# Patient Record
Sex: FEMALE | Race: WHITE | Employment: OTHER | URBAN - METROPOLITAN AREA
[De-identification: names, ages, dates, MRNs, and addresses within clinical notes are randomized per-mention and may not be internally consistent; named-entity substitution may affect disease eponyms.]

---

## 2019-05-15 ENCOUNTER — HOSPITAL ENCOUNTER (INPATIENT)
Age: 49
LOS: 6 days | Discharge: NURSING, REHAB OR LONG-TERM CARE | DRG: 571 | End: 2019-05-21
Attending: INTERNAL MEDICINE | Admitting: INTERNAL MEDICINE
Payer: MEDICARE

## 2019-05-15 ENCOUNTER — APPOINTMENT (OUTPATIENT)
Dept: GENERAL RADIOLOGY | Age: 49
DRG: 571 | End: 2019-05-15
Payer: MEDICARE

## 2019-05-15 DIAGNOSIS — L03.116 CELLULITIS OF LEFT FOOT: Primary | ICD-10-CM

## 2019-05-15 DIAGNOSIS — L02.619 CELLULITIS AND ABSCESS OF FOOT: ICD-10-CM

## 2019-05-15 DIAGNOSIS — L02.619 ABSCESS OF FOOT: ICD-10-CM

## 2019-05-15 DIAGNOSIS — L03.119 CELLULITIS AND ABSCESS OF FOOT: ICD-10-CM

## 2019-05-15 PROBLEM — Z91.81 RISK FOR FALLS: Status: ACTIVE | Noted: 2019-05-15

## 2019-05-15 PROBLEM — D72.829 LEUKOCYTOSIS: Status: ACTIVE | Noted: 2019-05-15

## 2019-05-15 PROBLEM — M79.672 PAIN IN LEFT FOOT: Status: ACTIVE | Noted: 2019-05-15

## 2019-05-15 PROBLEM — L02.612 ABSCESS OF LEFT FOOT: Status: ACTIVE | Noted: 2019-05-15

## 2019-05-15 LAB
ANION GAP SERPL CALCULATED.3IONS-SCNC: 12 MMOL/L (ref 7–16)
BASOPHILS ABSOLUTE: 0.06 E9/L (ref 0–0.2)
BASOPHILS RELATIVE PERCENT: 0.4 % (ref 0–2)
BUN BLDV-MCNC: 11 MG/DL (ref 6–20)
C-REACTIVE PROTEIN: 9.1 MG/DL (ref 0–0.4)
CALCIUM SERPL-MCNC: 8.9 MG/DL (ref 8.6–10.2)
CHLORIDE BLD-SCNC: 100 MMOL/L (ref 98–107)
CO2: 23 MMOL/L (ref 22–29)
CREAT SERPL-MCNC: 0.5 MG/DL (ref 0.5–1)
EOSINOPHILS ABSOLUTE: 0.2 E9/L (ref 0.05–0.5)
EOSINOPHILS RELATIVE PERCENT: 1.5 % (ref 0–6)
GFR AFRICAN AMERICAN: >60
GFR NON-AFRICAN AMERICAN: >60 ML/MIN/1.73
GLUCOSE BLD-MCNC: 136 MG/DL (ref 74–99)
HCT VFR BLD CALC: 36.5 % (ref 34–48)
HEMOGLOBIN: 12.3 G/DL (ref 11.5–15.5)
IMMATURE GRANULOCYTES #: 0.09 E9/L
IMMATURE GRANULOCYTES %: 0.7 % (ref 0–5)
LACTIC ACID: 1.2 MMOL/L (ref 0.5–2.2)
LYMPHOCYTES ABSOLUTE: 1.18 E9/L (ref 1.5–4)
LYMPHOCYTES RELATIVE PERCENT: 8.7 % (ref 20–42)
MCH RBC QN AUTO: 31.1 PG (ref 26–35)
MCHC RBC AUTO-ENTMCNC: 33.7 % (ref 32–34.5)
MCV RBC AUTO: 92.4 FL (ref 80–99.9)
MONOCYTES ABSOLUTE: 1.13 E9/L (ref 0.1–0.95)
MONOCYTES RELATIVE PERCENT: 8.4 % (ref 2–12)
NEUTROPHILS ABSOLUTE: 10.87 E9/L (ref 1.8–7.3)
NEUTROPHILS RELATIVE PERCENT: 80.3 % (ref 43–80)
PDW BLD-RTO: 12.6 FL (ref 11.5–15)
PLATELET # BLD: 277 E9/L (ref 130–450)
PMV BLD AUTO: 10 FL (ref 7–12)
POTASSIUM SERPL-SCNC: 4 MMOL/L (ref 3.5–5)
RBC # BLD: 3.95 E12/L (ref 3.5–5.5)
SEDIMENTATION RATE, ERYTHROCYTE: 63 MM/HR (ref 0–20)
SODIUM BLD-SCNC: 135 MMOL/L (ref 132–146)
WBC # BLD: 13.5 E9/L (ref 4.5–11.5)

## 2019-05-15 PROCEDURE — 99220 PR INITIAL OBSERVATION CARE/DAY 70 MINUTES: CPT | Performed by: NURSE PRACTITIONER

## 2019-05-15 PROCEDURE — 86140 C-REACTIVE PROTEIN: CPT

## 2019-05-15 PROCEDURE — 2580000003 HC RX 258: Performed by: PHYSICIAN ASSISTANT

## 2019-05-15 PROCEDURE — 87070 CULTURE OTHR SPECIMN AEROBIC: CPT

## 2019-05-15 PROCEDURE — 6360000002 HC RX W HCPCS: Performed by: PHYSICIAN ASSISTANT

## 2019-05-15 PROCEDURE — 87040 BLOOD CULTURE FOR BACTERIA: CPT

## 2019-05-15 PROCEDURE — 73590 X-RAY EXAM OF LOWER LEG: CPT

## 2019-05-15 PROCEDURE — 96375 TX/PRO/DX INJ NEW DRUG ADDON: CPT

## 2019-05-15 PROCEDURE — 1200000000 HC SEMI PRIVATE

## 2019-05-15 PROCEDURE — 36415 COLL VENOUS BLD VENIPUNCTURE: CPT

## 2019-05-15 PROCEDURE — 6370000000 HC RX 637 (ALT 250 FOR IP): Performed by: NURSE PRACTITIONER

## 2019-05-15 PROCEDURE — 73630 X-RAY EXAM OF FOOT: CPT

## 2019-05-15 PROCEDURE — 87077 CULTURE AEROBIC IDENTIFY: CPT

## 2019-05-15 PROCEDURE — 85651 RBC SED RATE NONAUTOMATED: CPT

## 2019-05-15 PROCEDURE — 83605 ASSAY OF LACTIC ACID: CPT

## 2019-05-15 PROCEDURE — 96365 THER/PROPH/DIAG IV INF INIT: CPT

## 2019-05-15 PROCEDURE — 99285 EMERGENCY DEPT VISIT HI MDM: CPT

## 2019-05-15 PROCEDURE — 2580000003 HC RX 258: Performed by: NURSE PRACTITIONER

## 2019-05-15 PROCEDURE — 85025 COMPLETE CBC W/AUTO DIFF WBC: CPT

## 2019-05-15 PROCEDURE — 80048 BASIC METABOLIC PNL TOTAL CA: CPT

## 2019-05-15 PROCEDURE — 87186 SC STD MICRODIL/AGAR DIL: CPT

## 2019-05-15 RX ORDER — CEPHALEXIN 500 MG/1
500 CAPSULE ORAL 2 TIMES DAILY
Status: ON HOLD | COMMUNITY
End: 2019-05-16 | Stop reason: HOSPADM

## 2019-05-15 RX ORDER — SODIUM CHLORIDE 0.9 % (FLUSH) 0.9 %
10 SYRINGE (ML) INJECTION PRN
Status: DISCONTINUED | OUTPATIENT
Start: 2019-05-15 | End: 2019-05-17 | Stop reason: SDUPTHER

## 2019-05-15 RX ORDER — ACETAMINOPHEN 325 MG/1
650 TABLET ORAL EVERY 4 HOURS PRN
Status: DISCONTINUED | OUTPATIENT
Start: 2019-05-15 | End: 2019-05-21 | Stop reason: HOSPADM

## 2019-05-15 RX ORDER — BETAMETHASONE DIPROPIONATE 0.5 MG/G
1 CREAM TOPICAL 2 TIMES DAILY
Status: ON HOLD | COMMUNITY
End: 2019-05-21 | Stop reason: HOSPADM

## 2019-05-15 RX ORDER — SODIUM CHLORIDE 9 MG/ML
INJECTION, SOLUTION INTRAVENOUS CONTINUOUS
Status: DISCONTINUED | OUTPATIENT
Start: 2019-05-15 | End: 2019-05-19

## 2019-05-15 RX ORDER — 0.9 % SODIUM CHLORIDE 0.9 %
1000 INTRAVENOUS SOLUTION INTRAVENOUS ONCE
Status: COMPLETED | OUTPATIENT
Start: 2019-05-15 | End: 2019-05-15

## 2019-05-15 RX ORDER — FOLIC ACID 1 MG/1
1 TABLET ORAL DAILY
COMMUNITY
End: 2021-11-24 | Stop reason: ALTCHOICE

## 2019-05-15 RX ORDER — FOLIC ACID 1 MG/1
1 TABLET ORAL DAILY
Status: DISCONTINUED | OUTPATIENT
Start: 2019-05-16 | End: 2019-05-21 | Stop reason: HOSPADM

## 2019-05-15 RX ORDER — ONDANSETRON 2 MG/ML
4 INJECTION INTRAMUSCULAR; INTRAVENOUS EVERY 6 HOURS PRN
Status: DISCONTINUED | OUTPATIENT
Start: 2019-05-15 | End: 2019-05-21 | Stop reason: HOSPADM

## 2019-05-15 RX ORDER — THIAMINE MONONITRATE (VIT B1) 100 MG
100 TABLET ORAL DAILY
COMMUNITY

## 2019-05-15 RX ORDER — GABAPENTIN 300 MG/1
300 CAPSULE ORAL 3 TIMES DAILY
COMMUNITY

## 2019-05-15 RX ORDER — THIAMINE MONONITRATE (VIT B1) 100 MG
100 TABLET ORAL DAILY
Status: DISCONTINUED | OUTPATIENT
Start: 2019-05-16 | End: 2019-05-21 | Stop reason: HOSPADM

## 2019-05-15 RX ORDER — VITAMIN B COMPLEX
1 CAPSULE ORAL DAILY
COMMUNITY
End: 2019-05-15 | Stop reason: DRUGHIGH

## 2019-05-15 RX ORDER — GABAPENTIN 600 MG/1
600 TABLET ORAL 3 TIMES DAILY
COMMUNITY
End: 2019-05-15 | Stop reason: DRUGHIGH

## 2019-05-15 RX ORDER — SULFAMETHOXAZOLE AND TRIMETHOPRIM 800; 160 MG/1; MG/1
1 TABLET ORAL 2 TIMES DAILY
Status: ON HOLD | COMMUNITY
End: 2019-05-16 | Stop reason: HOSPADM

## 2019-05-15 RX ORDER — GABAPENTIN 300 MG/1
300 CAPSULE ORAL 3 TIMES DAILY
Status: DISCONTINUED | OUTPATIENT
Start: 2019-05-15 | End: 2019-05-21 | Stop reason: HOSPADM

## 2019-05-15 RX ORDER — SODIUM CHLORIDE 0.9 % (FLUSH) 0.9 %
10 SYRINGE (ML) INJECTION EVERY 12 HOURS SCHEDULED
Status: DISCONTINUED | OUTPATIENT
Start: 2019-05-15 | End: 2019-05-17 | Stop reason: SDUPTHER

## 2019-05-15 RX ADMIN — Medication 10 ML: at 21:13

## 2019-05-15 RX ADMIN — VANCOMYCIN HYDROCHLORIDE 1500 MG: 10 INJECTION, POWDER, LYOPHILIZED, FOR SOLUTION INTRAVENOUS at 18:42

## 2019-05-15 RX ADMIN — SERTRALINE HYDROCHLORIDE 50 MG: 50 TABLET ORAL at 21:12

## 2019-05-15 RX ADMIN — CEFAZOLIN 2 G: 1 INJECTION, POWDER, FOR SOLUTION INTRAMUSCULAR; INTRAVENOUS at 18:28

## 2019-05-15 RX ADMIN — SODIUM CHLORIDE 1000 ML: 9 INJECTION, SOLUTION INTRAVENOUS at 18:31

## 2019-05-15 RX ADMIN — SODIUM CHLORIDE: 9 INJECTION, SOLUTION INTRAVENOUS at 21:12

## 2019-05-15 RX ADMIN — GABAPENTIN 300 MG: 300 CAPSULE ORAL at 21:12

## 2019-05-15 ASSESSMENT — PAIN DESCRIPTION - ORIENTATION: ORIENTATION: LEFT

## 2019-05-15 ASSESSMENT — PAIN SCALES - GENERAL: PAINLEVEL_OUTOF10: 5

## 2019-05-15 ASSESSMENT — PAIN DESCRIPTION - ONSET: ONSET: ON-GOING

## 2019-05-15 ASSESSMENT — PAIN - FUNCTIONAL ASSESSMENT: PAIN_FUNCTIONAL_ASSESSMENT: PREVENTS OR INTERFERES SOME ACTIVE ACTIVITIES AND ADLS

## 2019-05-15 ASSESSMENT — PAIN DESCRIPTION - LOCATION: LOCATION: FOOT

## 2019-05-15 ASSESSMENT — PAIN DESCRIPTION - PAIN TYPE: TYPE: ACUTE PAIN

## 2019-05-15 ASSESSMENT — PAIN DESCRIPTION - PROGRESSION: CLINICAL_PROGRESSION: NOT CHANGED

## 2019-05-15 ASSESSMENT — PAIN DESCRIPTION - FREQUENCY: FREQUENCY: CONTINUOUS

## 2019-05-15 ASSESSMENT — PAIN DESCRIPTION - DESCRIPTORS: DESCRIPTORS: ACHING;DISCOMFORT;SORE

## 2019-05-15 NOTE — ED PROVIDER NOTES
ED Attending  CC: No  HPI:  5/15/19, Time: 5:21 PM         Hemal Goldsmith is a 51 y.o. female presenting to the ED for  Left foot infection beginning 2 days  ago. The complaint has been persistent, moderate in severity, and worsened by nothing. patient comes in with complaint of infection of her left foot. She states she injured the foot previously when she was stompping her foot on a lawn edger. She did attempt to soak the foot in a bleach water burn to the plantar aspect of the foot. She states she is not diabetic,she  was seen 2 days ago started on antibiotics by Dr. Chelita Tsai . Today was sent in by Dr. Chelita Tsai for admission for foot infection. She denies any fever chills. Review of Systems:   Pertinent positives and negatives are stated within HPI, all other systems reviewed and are negative.          --------------------------------------------- PAST HISTORY ---------------------------------------------  Past Medical History:  has a past medical history of Anxiety and Depression. Past Surgical History:  has a past surgical history that includes Tubal ligation and cyst removal.    Social History:  reports that she has been smoking cigarettes. She has been smoking about 0.50 packs per day. She has never used smokeless tobacco. She reports that she drinks alcohol. She reports that she does not use drugs. Family History: family history is not on file. The patients home medications have been reviewed. Allergies: Patient has no known allergies.     -------------------------------------------------- RESULTS -------------------------------------------------  All laboratory and radiology results have been personally reviewed by myself   LABS:  Results for orders placed or performed during the hospital encounter of 05/15/19   CBC Auto Differential   Result Value Ref Range    WBC 13.5 (H) 4.5 - 11.5 E9/L    RBC 3.95 3.50 - 5.50 E12/L    Hemoglobin 12.3 11.5 - 15.5 g/dL    Hematocrit 36.5 34.0 - 48.0 % MCV 92.4 80.0 - 99.9 fL    MCH 31.1 26.0 - 35.0 pg    MCHC 33.7 32.0 - 34.5 %    RDW 12.6 11.5 - 15.0 fL    Platelets 426 244 - 523 E9/L    MPV 10.0 7.0 - 12.0 fL    Neutrophils % 80.3 (H) 43.0 - 80.0 %    Immature Granulocytes % 0.7 0.0 - 5.0 %    Lymphocytes % 8.7 (L) 20.0 - 42.0 %    Monocytes % 8.4 2.0 - 12.0 %    Eosinophils % 1.5 0.0 - 6.0 %    Basophils % 0.4 0.0 - 2.0 %    Neutrophils # 10.87 (H) 1.80 - 7.30 E9/L    Immature Granulocytes # 0.09 E9/L    Lymphocytes # 1.18 (L) 1.50 - 4.00 E9/L    Monocytes # 1.13 (H) 0.10 - 0.95 E9/L    Eosinophils # 0.20 0.05 - 0.50 E9/L    Basophils # 0.06 0.00 - 0.20 Z0/C   Basic Metabolic Panel   Result Value Ref Range    Sodium 135 132 - 146 mmol/L    Potassium 4.0 3.5 - 5.0 mmol/L    Chloride 100 98 - 107 mmol/L    CO2 23 22 - 29 mmol/L    Anion Gap 12 7 - 16 mmol/L    Glucose 136 (H) 74 - 99 mg/dL    BUN 11 6 - 20 mg/dL    CREATININE 0.5 0.5 - 1.0 mg/dL    GFR Non-African American >60 >=60 mL/min/1.73    GFR African American >60     Calcium 8.9 8.6 - 10.2 mg/dL   Lactic Acid, Plasma   Result Value Ref Range    Lactic Acid 1.2 0.5 - 2.2 mmol/L   Sedimentation Rate   Result Value Ref Range    Sed Rate 63 (H) 0 - 20 mm/Hr       RADIOLOGY:  Interpreted by Radiologist.  XR FOOT LEFT (MIN 3 VIEWS)   Final Result   1. There is no appreciable soft tissue foreign body and there are no   plain film findings of osteomyelitis. 2. Minimal soft tissue emphysema is seen along the lateral cortex of   the first metatarsal. The findings are suggestive of a soft tissue   abscess. XR TIBIA FIBULA LEFT (2 VIEWS)   Final Result   1. Unremarkable left tibia and fibula.          ------------------------- NURSING NOTES AND VITALS REVIEWED ---------------------------   The nursing notes within the ED encounter and vital signs as below have been reviewed.    BP (!) 180/70   Pulse 97   Temp 98 °F (36.7 °C) (Oral)   Resp 18   Ht 6' (1.829 m)   Wt 169 lb (76.7 kg)   SpO2 98%   BMI 22.92 kg/m²   Oxygen Saturation Interpretation: Normal      ---------------------------------------------------PHYSICAL EXAM--------------------------------------      Constitutional/General: Alert and oriented x3, well appearing, non toxic in NAD  Head: Normocephalic and atraumatic  Eyes: PERRL, EOMI  Mouth: Oropharynx clear, handling secretions, no trismus  Neck: Supple, full ROM,   Pulmonary: Lungs clear to auscultation bilaterally, no wheezes, rales, or rhonchi. Not in respiratory distress  Cardiovascular:  Regular rate and rhythm, no murmurs, gallops, or rubs. 2+ distal pulses  Abdomen: Soft, non tender, non distended,   Extremities: Moves all extremities x 4.  Warm and well perfused  Skin: warm and dry without rash  Neurologic: GCS 15,  Psych: Normal Affect      ------------------------------ ED COURSE/MEDICAL DECISION MAKING----------------------  Medications   folic acid (FOLVITE) tablet 1 mg (has no administration in time range)   gabapentin (NEURONTIN) capsule 300 mg (300 mg Oral Given 5/15/19 2112)   sertraline (ZOLOFT) tablet 50 mg (50 mg Oral Given 5/15/19 2112)   vitamin B-1 (THIAMINE) tablet 100 mg (has no administration in time range)   sodium chloride flush 0.9 % injection 10 mL (10 mLs Intravenous Given 5/15/19 2113)   sodium chloride flush 0.9 % injection 10 mL (has no administration in time range)   magnesium hydroxide (MILK OF MAGNESIA) 400 MG/5ML suspension 30 mL (has no administration in time range)   ondansetron (ZOFRAN) injection 4 mg (has no administration in time range)   enoxaparin (LOVENOX) injection 40 mg (has no administration in time range)   0.9 % sodium chloride infusion ( Intravenous New Bag 5/15/19 6071)   acetaminophen (TYLENOL) tablet 650 mg (has no administration in time range)   ceFAZolin (ANCEF) 1 g in sterile water 10 mL IV syringe (has no administration in time range)   vancomycin (VANCOCIN) 1,500 mg in dextrose 5 % 300 mL IVPB (has no administration in time range) ceFAZolin (ANCEF) 2 g in sterile water 20 mL IV syringe (2 g Intravenous Given 5/15/19 1828)   vancomycin (VANCOCIN) 1,500 mg in dextrose 5 % 300 mL IVPB (1,500 mg Intravenous New Bag 5/15/19 1842)   0.9 % sodium chloride bolus (0 mLs Intravenous Stopped 5/15/19 2006)         ED COURSE:     Yumiko Discussed with Dr. Evelyn Girard , he is agreeable to admission. He would like patient's vitals reassessed after IV fluid if heart rate less than 100 she can go to [facility] greater than 100 telemetry floor. 1850 updated patient and family on possible abscess finding plan for admission. 1900 discussed with Dr. Charmayne Sines finding of on subcutaneous air or possible abscess , he will have resident see and evaluate patient and order Mri of the foot       Medical Decision Making:    Patient coming in with complaint of left foot infection, sent in by podiatry for admission. She was given a dose of Ancef and vancomycin. X-ray showed possibility of abscess of the foot. Discussed this with Dr. Asa Mireles he will have the resident's see patient and will obtain an MRI to further assess. Patient is agreeable to admission she is admitted under primary care service         Counseling: The emergency provider has spoken with the patient and discussed todays results, in addition to providing specific details for the plan of care and counseling regarding the diagnosis and prognosis. Questions are answered at this time and they are agreeable with the plan.      --------------------------------- IMPRESSION AND DISPOSITION ---------------------------------    IMPRESSION  1. Cellulitis of left foot    2. Abscess of foot        DISPOSITION  Disposition admit to general medical floor   Patient condition is fair      NOTE: This report was transcribed using voice recognition software.  Every effort was made to ensure accuracy; however, inadvertent computerized transcription errors may be present     Salina Thomsonma  05/15/19 8824

## 2019-05-16 ENCOUNTER — APPOINTMENT (OUTPATIENT)
Dept: MRI IMAGING | Age: 49
DRG: 571 | End: 2019-05-16
Payer: MEDICARE

## 2019-05-16 ENCOUNTER — ANESTHESIA EVENT (OUTPATIENT)
Dept: OPERATING ROOM | Age: 49
DRG: 571 | End: 2019-05-16
Payer: MEDICARE

## 2019-05-16 LAB
ANION GAP SERPL CALCULATED.3IONS-SCNC: 12 MMOL/L (ref 7–16)
BASOPHILS ABSOLUTE: 0.06 E9/L (ref 0–0.2)
BASOPHILS RELATIVE PERCENT: 0.7 % (ref 0–2)
BUN BLDV-MCNC: 11 MG/DL (ref 6–20)
CALCIUM SERPL-MCNC: 8.4 MG/DL (ref 8.6–10.2)
CHLORIDE BLD-SCNC: 106 MMOL/L (ref 98–107)
CO2: 21 MMOL/L (ref 22–29)
CREAT SERPL-MCNC: 0.5 MG/DL (ref 0.5–1)
EOSINOPHILS ABSOLUTE: 0.25 E9/L (ref 0.05–0.5)
EOSINOPHILS RELATIVE PERCENT: 2.9 % (ref 0–6)
GFR AFRICAN AMERICAN: >60
GFR NON-AFRICAN AMERICAN: >60 ML/MIN/1.73
GLUCOSE BLD-MCNC: 104 MG/DL (ref 74–99)
HCT VFR BLD CALC: 33.7 % (ref 34–48)
HEMOGLOBIN: 11 G/DL (ref 11.5–15.5)
IMMATURE GRANULOCYTES #: 0.08 E9/L
IMMATURE GRANULOCYTES %: 0.9 % (ref 0–5)
LACTIC ACID: 1.2 MMOL/L (ref 0.5–2.2)
LYMPHOCYTES ABSOLUTE: 1.82 E9/L (ref 1.5–4)
LYMPHOCYTES RELATIVE PERCENT: 20.8 % (ref 20–42)
MCH RBC QN AUTO: 30.6 PG (ref 26–35)
MCHC RBC AUTO-ENTMCNC: 32.6 % (ref 32–34.5)
MCV RBC AUTO: 93.6 FL (ref 80–99.9)
MONOCYTES ABSOLUTE: 1.04 E9/L (ref 0.1–0.95)
MONOCYTES RELATIVE PERCENT: 11.9 % (ref 2–12)
NEUTROPHILS ABSOLUTE: 5.49 E9/L (ref 1.8–7.3)
NEUTROPHILS RELATIVE PERCENT: 62.8 % (ref 43–80)
PDW BLD-RTO: 12.6 FL (ref 11.5–15)
PLATELET # BLD: 282 E9/L (ref 130–450)
PMV BLD AUTO: 9.7 FL (ref 7–12)
POTASSIUM REFLEX MAGNESIUM: 4 MMOL/L (ref 3.5–5)
RBC # BLD: 3.6 E12/L (ref 3.5–5.5)
SODIUM BLD-SCNC: 139 MMOL/L (ref 132–146)
VANCOMYCIN TROUGH: 5.8 MCG/ML (ref 5–16)
WBC # BLD: 8.7 E9/L (ref 4.5–11.5)

## 2019-05-16 PROCEDURE — 36415 COLL VENOUS BLD VENIPUNCTURE: CPT

## 2019-05-16 PROCEDURE — 85025 COMPLETE CBC W/AUTO DIFF WBC: CPT

## 2019-05-16 PROCEDURE — APPSS30 APP SPLIT SHARED TIME 16-30 MINUTES: Performed by: NURSE PRACTITIONER

## 2019-05-16 PROCEDURE — 80202 ASSAY OF VANCOMYCIN: CPT

## 2019-05-16 PROCEDURE — 1200000000 HC SEMI PRIVATE

## 2019-05-16 PROCEDURE — 83605 ASSAY OF LACTIC ACID: CPT

## 2019-05-16 PROCEDURE — 99233 SBSQ HOSP IP/OBS HIGH 50: CPT | Performed by: INTERNAL MEDICINE

## 2019-05-16 PROCEDURE — 6360000002 HC RX W HCPCS: Performed by: NURSE PRACTITIONER

## 2019-05-16 PROCEDURE — 6370000000 HC RX 637 (ALT 250 FOR IP): Performed by: NURSE PRACTITIONER

## 2019-05-16 PROCEDURE — 73718 MRI LOWER EXTREMITY W/O DYE: CPT

## 2019-05-16 PROCEDURE — 2580000003 HC RX 258: Performed by: NURSE PRACTITIONER

## 2019-05-16 PROCEDURE — 80048 BASIC METABOLIC PNL TOTAL CA: CPT

## 2019-05-16 RX ORDER — KETOROLAC TROMETHAMINE 15 MG/ML
15 INJECTION, SOLUTION INTRAMUSCULAR; INTRAVENOUS EVERY 6 HOURS PRN
Status: DISPENSED | OUTPATIENT
Start: 2019-05-16 | End: 2019-05-17

## 2019-05-16 RX ORDER — LANOLIN ALCOHOL/MO/W.PET/CERES
3 CREAM (GRAM) TOPICAL NIGHTLY PRN
Status: DISCONTINUED | OUTPATIENT
Start: 2019-05-16 | End: 2019-05-21 | Stop reason: HOSPADM

## 2019-05-16 RX ADMIN — KETOROLAC TROMETHAMINE 15 MG: 15 INJECTION, SOLUTION INTRAMUSCULAR; INTRAVENOUS at 21:11

## 2019-05-16 RX ADMIN — VANCOMYCIN HYDROCHLORIDE 1500 MG: 10 INJECTION, POWDER, LYOPHILIZED, FOR SOLUTION INTRAVENOUS at 06:33

## 2019-05-16 RX ADMIN — WATER 1 G: 1 INJECTION INTRAMUSCULAR; INTRAVENOUS; SUBCUTANEOUS at 09:31

## 2019-05-16 RX ADMIN — ACETAMINOPHEN 650 MG: 325 TABLET, FILM COATED ORAL at 11:12

## 2019-05-16 RX ADMIN — ENOXAPARIN SODIUM 40 MG: 40 INJECTION SUBCUTANEOUS at 09:31

## 2019-05-16 RX ADMIN — WATER 1 G: 1 INJECTION INTRAMUSCULAR; INTRAVENOUS; SUBCUTANEOUS at 03:22

## 2019-05-16 RX ADMIN — SERTRALINE HYDROCHLORIDE 50 MG: 50 TABLET ORAL at 21:11

## 2019-05-16 RX ADMIN — GABAPENTIN 300 MG: 300 CAPSULE ORAL at 21:11

## 2019-05-16 RX ADMIN — WATER 1 G: 1 INJECTION INTRAMUSCULAR; INTRAVENOUS; SUBCUTANEOUS at 17:19

## 2019-05-16 RX ADMIN — FOLIC ACID 1 MG: 1 TABLET ORAL at 09:31

## 2019-05-16 RX ADMIN — VANCOMYCIN HYDROCHLORIDE 1250 MG: 10 INJECTION, POWDER, LYOPHILIZED, FOR SOLUTION INTRAVENOUS at 21:11

## 2019-05-16 RX ADMIN — GABAPENTIN 300 MG: 300 CAPSULE ORAL at 14:15

## 2019-05-16 RX ADMIN — THIAMINE HCL TAB 100 MG 100 MG: 100 TAB at 09:31

## 2019-05-16 RX ADMIN — SODIUM CHLORIDE: 9 INJECTION, SOLUTION INTRAVENOUS at 03:30

## 2019-05-16 RX ADMIN — GABAPENTIN 300 MG: 300 CAPSULE ORAL at 09:31

## 2019-05-16 RX ADMIN — ACETAMINOPHEN 650 MG: 325 TABLET, FILM COATED ORAL at 04:50

## 2019-05-16 RX ADMIN — MELATONIN TAB 3 MG 3 MG: 3 TAB at 21:11

## 2019-05-16 ASSESSMENT — PAIN SCALES - GENERAL
PAINLEVEL_OUTOF10: 5
PAINLEVEL_OUTOF10: 0
PAINLEVEL_OUTOF10: 4
PAINLEVEL_OUTOF10: 2
PAINLEVEL_OUTOF10: 0
PAINLEVEL_OUTOF10: 5
PAINLEVEL_OUTOF10: 6

## 2019-05-16 ASSESSMENT — PAIN DESCRIPTION - PROGRESSION
CLINICAL_PROGRESSION: NOT CHANGED
CLINICAL_PROGRESSION: GRADUALLY WORSENING

## 2019-05-16 ASSESSMENT — PAIN DESCRIPTION - FREQUENCY
FREQUENCY: CONTINUOUS
FREQUENCY: INTERMITTENT

## 2019-05-16 ASSESSMENT — PAIN DESCRIPTION - PAIN TYPE
TYPE: ACUTE PAIN
TYPE: ACUTE PAIN

## 2019-05-16 ASSESSMENT — PAIN DESCRIPTION - ORIENTATION
ORIENTATION: LEFT
ORIENTATION: LEFT

## 2019-05-16 ASSESSMENT — PAIN - FUNCTIONAL ASSESSMENT
PAIN_FUNCTIONAL_ASSESSMENT: PREVENTS OR INTERFERES SOME ACTIVE ACTIVITIES AND ADLS
PAIN_FUNCTIONAL_ASSESSMENT: PREVENTS OR INTERFERES SOME ACTIVE ACTIVITIES AND ADLS

## 2019-05-16 ASSESSMENT — PAIN DESCRIPTION - ONSET
ONSET: GRADUAL
ONSET: GRADUAL

## 2019-05-16 ASSESSMENT — PAIN DESCRIPTION - DESCRIPTORS
DESCRIPTORS: ACHING;DISCOMFORT
DESCRIPTORS: ACHING;DISCOMFORT;TENDER

## 2019-05-16 ASSESSMENT — PAIN DESCRIPTION - LOCATION
LOCATION: FOOT
LOCATION: FOOT

## 2019-05-16 ASSESSMENT — LIFESTYLE VARIABLES: SMOKING_STATUS: 1

## 2019-05-16 NOTE — H&P
negative. PMH:  Past Medical History:   Diagnosis Date    Anxiety     Depression        Surgical History:  Past Surgical History:   Procedure Laterality Date    CYST REMOVAL      TUBAL LIGATION         Medications Prior to Admission:    Prior to Admission medications    Medication Sig Start Date End Date Taking? Authorizing Provider   sertraline (ZOLOFT) 50 MG tablet Take 50 mg by mouth nightly   Yes Historical Provider, MD   gabapentin (NEURONTIN) 300 MG capsule Take 300 mg by mouth 3 times daily. Yes Historical Provider, MD   vitamin B-1 (THIAMINE) 100 MG tablet Take 100 mg by mouth daily   Yes Historical Provider, MD   sulfamethoxazole-trimethoprim (BACTRIM DS;SEPTRA DS) 800-160 MG per tablet Take 1 tablet by mouth 2 times daily   Yes Historical Provider, MD   cephALEXin (KEFLEX) 500 MG capsule Take 500 mg by mouth 2 times daily   Yes Historical Provider, MD   folic acid (FOLVITE) 1 MG tablet Take 1 mg by mouth daily   Yes Historical Provider, MD   betamethasone dipropionate (DIPROLENE) 0.05 % cream Apply 1 g topically 2 times daily Apply topically 2 times daily. Yes Historical Provider, MD       Allergies:    Patient has no known allergies. Social History:    reports that she has been smoking cigarettes. She has been smoking about 0.50 packs per day. She has never used smokeless tobacco. She reports that she drinks alcohol. She reports that she does not use drugs. Family History:   family history is not on file. PHYSICAL EXAM:  Vitals:  BP (!) 180/70   Pulse 97   Temp 98 °F (36.7 °C) (Oral)   Resp 18   Ht 6' (1.829 m)   Wt 169 lb (76.7 kg)   SpO2 98%   BMI 22.92 kg/m²     General Appearance: alert and oriented to person, place and time and in no acute distress  Skin: warm and dry. Left pedal edema, erythema, sloughing, and drainage.    Head: normocephalic and atraumatic  Eyes: pupils equal, round, and reactive to light, extraocular eye movements intact, conjunctivae normal  Neck: neck supple and non tender without mass   Pulmonary/Chest: clear to auscultation bilaterally- no wheezes, rales or rhonchi, normal air movement, no respiratory distress  Cardiovascular: normal rate, normal S1 and S2. No gallops, rubs, or murmur noted. Distal pulse 2+  Abdomen: soft, non-tender, non-distended, normal bowel sounds, no masses or organomegaly  Extremities: no cyanosis, no clubbing. Left foot and lower extremity edema. Neurologic: no cranial nerve deficit and speech normal    LABS:  Recent Labs     05/15/19  1753      K 4.0      CO2 23   BUN 11   CREATININE 0.5   GLUCOSE 136*   CALCIUM 8.9       Recent Labs     05/15/19  1753   WBC 13.5*   RBC 3.95   HGB 12.3   HCT 36.5   MCV 92.4   MCH 31.1   MCHC 33.7   RDW 12.6      MPV 10.0       No results for input(s): POCGLU in the last 72 hours. Radiology: Xr Tibia Fibula Left (2 Views)    Result Date: 5/15/2019  Reading location:  200 HISTORY: Wound on the left foot. TECHNIQUE: AP and lateral views of the left tibia and fibula were obtained. FINDINGS: There is no fracture of the tibia or fibula. No soft tissue foreign body is noted. 1. Unremarkable left tibia and fibula. Xr Foot Left (min 3 Views)    Result Date: 5/15/2019  Reading location:  200 HISTORY: Wound along the plantar aspect of the left foot. TECHNIQUE: 3 views of the left foot were obtained. FINDINGS: There is no fracture or dislocation of the left foot. Prominent soft tissue is seen along the plantar aspect of the midfoot. On the frontal view of the foot, there is subcutaneous emphysema seen along the lateral cortex of the first metatarsal. There is no periosteal reaction to suggest osteomyelitis. 1. There is no appreciable soft tissue foreign body and there are no plain film findings of osteomyelitis. 2. Minimal soft tissue emphysema is seen along the lateral cortex of the first metatarsal. The findings are suggestive of a soft tissue abscess.       EKG: None    ASSESSMENT:      Principal Problem:    Cellulitis and abscess of foot  Active Problems:    Leukocytosis    Risk for falls    Pain in left foot  Resolved Problems:    * No resolved hospital problems. *      PLAN:    1. Cellulitis and abscess of left foot- CT with minimal soft tissue emphysema seen along the lateral cortex of the first metatarsal. Suggestive of a soft tissue abscess. WBC 13.5. Sed rate 63. Lactic acid 1.2. Will repeat lactic acid in am. Podiatry consult. Vancomycin 1.5g IV and Cefazolin 2g IV initiated in ED. Vancomycin 1.5g BID and Cefazolin 1g IV Q8. Pharmacy to dose Vacomycin. Vancomycin trough prior to third dose. Wound culture. Wound care to evaluate. CBC, BMP in am.    2. Leukocytosis-WBC 13.5. IVF hydration. Antibiotic therapy as above. Consider Infectious disease consult. Follow blood and urine cultures. Lactic acid and CBC in am.   3. Risk for falls- Assistance with ambulation. Call light within reach. Hourly rounding. 4. Pain in left foot- Analgesia. Monitor pain as 5th VS.Assistance with mobility and repositioning. Code Status: Full  DVT prophylaxis: Lovenox    NOTE: This report was transcribed using voice recognition software. Every effort was made to ensure accuracy; however, inadvertent computerized transcription errors may be present.     Electronically signed by Liz Mccarthy.  Vivianne Duverney - CNP on 5/15/2019 at 10:52 PM

## 2019-05-16 NOTE — PROGRESS NOTES
[place] [address] [facility]   Progress Note    Admitting Date and Time: 5/15/2019  5:24 PM  Admit Dx: Abscess of left foot [L02.612]    Subjective:    Seen and examined  Does report pain to left foot  Very anxious to have a minimal hospital stay as possible      ROS: denies fever, chills, cp, sob, n/v, HA unless stated above.  folic acid  1 mg Oral Daily    gabapentin  300 mg Oral TID    sertraline  50 mg Oral Nightly    vitamin B-1  100 mg Oral Daily    sodium chloride flush  10 mL Intravenous 2 times per day    enoxaparin  40 mg Subcutaneous Daily    ceFAZolin  1 g Intravenous Q8H    vancomycin  1,500 mg Intravenous Q12H       sodium chloride flush 10 mL PRN   magnesium hydroxide 30 mL Daily PRN   ondansetron 4 mg Q6H PRN   acetaminophen 650 mg Q4H PRN        Objective:    BP (!) 154/82   Pulse 96   Temp 98 °F (36.7 °C)   Resp 18   Ht 6' (1.829 m)   Wt 169 lb (76.7 kg)   SpO2 98%   BMI 22.92 kg/m²   General Appearance: alert and oriented to person, place and time and in no acute distress  Skin: warm and dry  Head: normocephalic and atraumatic  Eyes: pupils equal, round, and reactive to light, extraocular eye movements intact, conjunctivae normal  Neck: neck supple and non tender without mass   Pulmonary/Chest: clear to auscultation bilaterally- no wheezes, rales or rhonchi, normal air movement, no respiratory distress  Cardiovascular: normal rate, normal S1 and S2 and no carotid bruits  Abdomen: soft, non-tender, non-distended, normal bowel sounds, no masses or organomegaly  Extremities: no cyanosis, no clubbing and no edema.  Large abscess area to left plantar aspect foot, purulent drainage  Neurologic: no cranial nerve deficit and speech normal      Recent Labs     05/15/19  1753 05/16/19  0501    139   K 4.0 4.0    106   CO2 23 21*   BUN 11 11   CREATININE 0.5 0.5   GLUCOSE 136* 104*   CALCIUM 8.9 8.4*       Recent Labs     05/15/19  1753 05/16/19  0501   WBC 13.5* 8.7 RBC 3.95 3.60   HGB 12.3 11.0*   HCT 36.5 33.7*   MCV 92.4 93.6   MCH 31.1 30.6   MCHC 33.7 32.6   RDW 12.6 12.6    282   MPV 10.0 9.7           Radiology:   MRI FOOT LEFT WO CONTRAST   Final Result   1. Suggestion of soft tissue irregularity within the medial and   plantar soft tissues are overlying the great toe and 1st metatarsal.   No discrete collection is identified to suggest an abscess. 2. No confluent hypointense T1 marrow signal within the great toe or   1st metatarsal to suggest acute osteomyelitis. 3. Small 2nd MTP joint effusion with patchy edema within the 2nd   metatarsal head, possibly reactive, although early changes of   osteomyelitis may have a similar appearance. 4. Mild arthrosis at the 1st TMT joint. XR FOOT LEFT (MIN 3 VIEWS)   Final Result   1. There is no appreciable soft tissue foreign body and there are no   plain film findings of osteomyelitis. 2. Minimal soft tissue emphysema is seen along the lateral cortex of   the first metatarsal. The findings are suggestive of a soft tissue   abscess. XR TIBIA FIBULA LEFT (2 VIEWS)   Final Result   1. Unremarkable left tibia and fibula. Assessment:  Principal Problem:    Cellulitis and abscess of foot  Active Problems:    Leukocytosis    Risk for falls    Pain in left foot  Resolved Problems:    * No resolved hospital problems. *      Plan:    1. Cellulitis and abscess of left foot-  Failed outpatient treatment with Keflex/BactrimXray left foot with minimal soft tissue emphysema  along the lateral cortex of the first metatarsal.  Suggestive of a soft tissue abscess. Sed rate 63. Lactic acid 1.2. Vancomycin 1.5g IV and Cefazolin 2g IV initiated in ED. Will continue for now. Pharmacy to dose Vacomycin. Vancomycin. Wound culture. Wound care to evaluate. CBC, BMP in am.  Has been seen by podiatry, plans for I&D tomorrow with possible wound vac placement. MRI negative for OM.  Consult ID per podiatry request,

## 2019-05-16 NOTE — CONSULTS
Department of Podiatry  Resident Consult Note    Subjective  Podiatry was consulted for a 51year old female who presents with a left foot infection. Pt states she had this wound on the bottom of her foot for approximately 2 weeks. Pt states she was weed wacking when she caught her foot. Pt states after she cut her foot she was soaking her foot and developed an infection. Today, she went to Dr. Natali Garcia podiatry office and after examination, she was told to go to the ED. Pt admits having red/clear drainage, increase redness, increase swelling, and foul odor. Pt admits she has drop foot and cannot feel in her feet. Pt states she also has calluses on her inside of her big toe and under her big toe on the right foot. No acute events overnight. Patient denies any N/V/D/F/C/SOB/CP and has no other complaints at this time. Objective    Past Medical History:   Diagnosis Date    Anxiety     Depression        Past Surgical History:   Procedure Laterality Date    CYST REMOVAL      TUBAL LIGATION         History reviewed. No pertinent family history. No Known Allergies    Vascular: DP and PT pulses 2/4 b/l. CFT <5 secs b/l. Skin temperature warm to cool from proximal to distal.   Neuro: Protective Sensation diminished b/l. Derm: Skin appears supple. Hyperkeratotic tissue noted to the plantar aspect of the 1st MPJ and medial aspect of the hallux of the right foot. Toenails 1-5 b/l are dystrophic. Webspaces 1-4 b/l are C/D/I. Musculoskeletal: Muscle Strength 5/5 for all groups b/l.    Wound Care:   Wound #1: left plantar medial midfoot   Size - approximately 5.0cm x 5.0cm   Appearance -mixed granular/fibrotic with macerated edges that probes to bone   Drainage -serosanguineous   Odor -yes     Wound #2: left plantar 1st MPJ   Size -approximately 1.0cm x 1.0cm   Appearance -fibrotic with tunneling extending laterally   Drainage - serosanguineous   Odor -yes                           Wound#3- left plantar aspect of the hallux               Size- approximately 1.0cm x 1.0cm               Appearance- Fibrotic no granulation with macerated edges               Drainage- serosangineous               Odor-yes     WBC:    Lab Results   Component Value Date    WBC 13.5 05/15/2019     BMP:    Lab Results   Component Value Date     05/15/2019    K 4.0 05/15/2019     05/15/2019    CO2 23 05/15/2019    BUN 11 05/15/2019    LABALBU 3.0 05/10/2015    CREATININE 0.5 05/15/2019    CALCIUM 8.9 05/15/2019    GFRAA >60 05/15/2019    LABGLOM >60 05/15/2019    GLUCOSE 136 05/15/2019       Scheduled Meds:   [START ON 0/88/7756] folic acid  1 mg Oral Daily    gabapentin  300 mg Oral TID    sertraline  50 mg Oral Nightly    [START ON 5/16/2019] vitamin B-1  100 mg Oral Daily    sodium chloride flush  10 mL Intravenous 2 times per day    [START ON 5/16/2019] enoxaparin  40 mg Subcutaneous Daily    [START ON 5/16/2019] ceFAZolin  1 g Intravenous Q8H    [START ON 5/16/2019] vancomycin  1,500 mg Intravenous Q12H     Continuous Infusions:   sodium chloride 125 mL/hr at 05/15/19 2112     PRN Meds:.sodium chloride flush, magnesium hydroxide, ondansetron, acetaminophen    Diagnostics      Assessment  1. Left foot wound  2. Cellulitis and abscess of foot  3. Leukocytosis    Plan  - Patient was examined and evaluated. - Reviewed patient's recent lab results and pertinent diagnostic imaging.  - Reviewed ancillary service notes. - MRI L foot WO contrast pending  - Daily dressings for alginate Ag+, 4x4, ABD and kerlix  -Current antibiotics-Vanco  - After MRI results, patient will be taken to surgery for possible to debridement   - Discussed patient with Dr. Adore Connolly, MARVIN.  - Will continue to follow patient while they are in-house.

## 2019-05-16 NOTE — PROGRESS NOTES
Pharmacy Consultation Note  (Antibiotic Dosing and Monitoring)    Initial consult date: 5/15/19  Consulting physician: Debbie Rogers CNP  Drug(s): Vancomycin  Indication: Cellulitis, abscess    Ht Readings from Last 1 Encounters:   19 6' (1.829 m)     Wt Readings from Last 1 Encounters:   05/15/19 169 lb (76.7 kg)         Age/  Gender IBW DW  Allergy Information   51 y.o.     female  76.7 kg  Patient has no known allergies. Date  WBC BUN/CR UOP Drug/Dose Time   Given Level(s)   (Time) Comments   5/15  (#1) 13.5 11/0.5 -- Vancomycin 1,500 mg IV x 1 184  (#2) 8.7 11/0.5 0.6 mL/kg/hr Vancomycin 1,500 mg IV Q12H 0633 5.8 mcg/mL @ 1841      (#3)            (#4)            (#5)            (#6)            (#7)            Estimated Creatinine Clearance: 157 mL/min (based on SCr of 0.5 mg/dL). UOP over the past 24 hours:       Intake/Output Summary (Last 24 hours) at 2019  Last data filed at 2019 1739  Gross per 24 hour   Intake 2610 ml   Output 1100 ml   Net 1510 ml       Temp max: Temp (24hrs), Av.1 °F (36.7 °C), Min:98 °F (36.7 °C), Max:98.3 °F (36.8 °C)      Antibiotic Regimen:  Antibiotic Dose Date Initiated   Cefazolin 1 g Q8H 5/15     Cultures:  available culture and sensitivity results were reviewed in EPIC  Cultures sent and are pending. Culture Date Result             Assessment:  · Consulted by Debbie Rogers CNP to dose/monitor vancomycin  · Goal trough level:  15-20 mcg/mL  · Pt is a 51 yof admitted with cellulitis and abscess of the foot, no history of diabetes.   · Serum creatinine today: 0.5; CrCl >100 mL/min; baseline Scr ~ 0.4 (), no recent labs  · : s/p I&D today with wound vac placement, level was collected today @ 1841 prior to the third dose = 5.8 mcg/mL (not ordered by pharmacy)    Plan:  · Adjust dose to Vancomycin 1,250 mg IV Q8H  · Check trough at steady state  · Follow renal function  · Pharmacist will follow and monitor/adjust dosing

## 2019-05-16 NOTE — ANESTHESIA PRE PROCEDURE
Department of Anesthesiology  Preprocedure Note       Name:  Deepak Buckley   Age:  51 y.o.  :  1970                                          MRN:  [id number]         Date:  2019      Surgeon: Silvio Ellis):  Lizzy Dick DPM    Procedure: LEFT PLANTAR FOOT INCISION AND DRAINAGE OF ABSCESS WITH POSS APPLICATION OF WOUND VAC AND OR GENTAMICIN GAUZE PACKING (PULSE VAC) (Left )    Medications prior to admission:   Prior to Admission medications    Medication Sig Start Date End Date Taking? Authorizing Provider   Delafloxacin Meglumine (BAXDELA) 450 MG TABS Take 450 mg by mouth 2 times daily for 10 days 19 Yes Angela Jessica MD   sertraline (ZOLOFT) 50 MG tablet Take 50 mg by mouth nightly   Yes Historical Provider, MD   gabapentin (NEURONTIN) 300 MG capsule Take 300 mg by mouth 3 times daily. Yes Historical Provider, MD   vitamin B-1 (THIAMINE) 100 MG tablet Take 100 mg by mouth daily   Yes Historical Provider, MD   folic acid (FOLVITE) 1 MG tablet Take 1 mg by mouth daily   Yes Historical Provider, MD   betamethasone dipropionate (DIPROLENE) 0.05 % cream Apply 1 g topically 2 times daily Apply topically 2 times daily.    Yes Historical Provider, MD       Current medications:    Current Facility-Administered Medications   Medication Dose Route Frequency Provider Last Rate Last Dose    folic acid (FOLVITE) tablet 1 mg  1 mg Oral Daily MILLY Johnson - CNP   1 mg at 19 1708    gabapentin (NEURONTIN) capsule 300 mg  300 mg Oral TID MILLY Johnson CNP   300 mg at 19 1415    sertraline (ZOLOFT) tablet 50 mg  50 mg Oral Nightly MILLY Johnson - CNP   50 mg at 05/15/19 211    vitamin B-1 (THIAMINE) tablet 100 mg  100 mg Oral Daily MILLY Johnson - CNP   100 mg at 19 0931    sodium chloride flush 0.9 % injection 10 mL  10 mL Intravenous 2 times per day MILLY Johnson CNP   10 mL at 05/15/19 2113    sodium chloride flush 0.9 % injection 10 mL  10 mL Intravenous PRN MILLY Ackerman - CNP        magnesium hydroxide (MILK OF MAGNESIA) 400 MG/5ML suspension 30 mL  30 mL Oral Daily PRN Timothy Tsai APRN - CNP        ondansetron [facility]) injection 4 mg  4 mg Intravenous Q6H PRN Timothy Tsai APRN - CNP        enoxaparin (LOVENOX) injection 40 mg  40 mg Subcutaneous Daily MILLY Ackerman - CNP   40 mg at 05/16/19 0931    0.9 % sodium chloride infusion   Intravenous Continuous MILLY Ackerman -  mL/hr at 05/16/19 0330      acetaminophen (TYLENOL) tablet 650 mg  650 mg Oral Q4H PRN Timothy Tsai, APRN - CNP   650 mg at 05/16/19 1112    ceFAZolin (ANCEF) 1 g in sterile water 10 mL IV syringe  1 g Intravenous Q8H Timothy Tsai APRN - CNP   1 g at 05/16/19 7900    vancomycin (VANCOCIN) 1,500 mg in dextrose 5 % 300 mL IVPB  1,500 mg Intravenous Q12H Timothy Tsai APRN - CNP   Stopped at 05/16/19 0803       Allergies:  No Known Allergies    Problem List:    Patient Active Problem List   Diagnosis Code    Leukocytosis D72.829    Risk for falls Z91.81    Cellulitis and abscess of foot L03.119, L02.619    Pain in left foot M79.672       Past Medical History:        Diagnosis Date    Anxiety     Depression        Past Surgical History:        Procedure Laterality Date    CYST REMOVAL      TUBAL LIGATION         Social History:    Social History     Tobacco Use    Smoking status: Current Some Day Smoker     Packs/day: 0.50     Types: Cigarettes    Smokeless tobacco: Never Used   Substance Use Topics    Alcohol use: Yes     Comment: 2-3 times weekly.                                  Ready to quit: Not Answered  Counseling given: Not Answered      Vital Signs (Current):   Vitals:    05/15/19 2030 05/15/19 2049 05/16/19 0900 05/16/19 0932   BP: (!) 180/70 (!) 180/70 (!) 154/82    Pulse: 97 97 96    Resp: 18  18    Temp: 98 °F (36.7 °C)  98 °F (36.7 °C)    TempSrc: Oral      SpO2: 98%  98% Weight:       Height:    6' (1.829 m)                                              BP Readings from Last 3 Encounters:   05/16/19 (!) 154/82       NPO Status:                                                                                 BMI:   Wt Readings from Last 3 Encounters:   05/15/19 169 lb (76.7 kg)     Body mass index is 22.92 kg/m². CBC:   Lab Results   Component Value Date    WBC 8.7 05/16/2019    RBC 3.60 05/16/2019    HGB 11.0 05/16/2019    HCT 33.7 05/16/2019    MCV 93.6 05/16/2019    RDW 12.6 05/16/2019     05/16/2019       CMP:   Lab Results   Component Value Date     05/16/2019    K 4.0 05/16/2019     05/16/2019    CO2 21 05/16/2019    BUN 11 05/16/2019    CREATININE 0.5 05/16/2019    GFRAA >60 05/16/2019    LABGLOM >60 05/16/2019    GLUCOSE 104 05/16/2019    PROT 6.1 05/10/2015    CALCIUM 8.4 05/16/2019    BILITOT 1.7 05/10/2015    ALKPHOS 137 05/10/2015    AST 67 05/10/2015    ALT 28 05/10/2015       POC Tests: No results for input(s): POCGLU, POCNA, POCK, POCCL, POCBUN, POCHEMO, POCHCT in the last 72 hours.     Coags: No results found for: PROTIME, INR, APTT    HCG (If Applicable): No results found for: PREGTESTUR, PREGSERUM, HCG, HCGQUANT     ABGs: No results found for: PHART, PO2ART, MSI0DVA, GDN2JHI, BEART, G7USQYEJ     Type & Screen (If Applicable):  No results found for: LABABO, [address]    Anesthesia Evaluation  Patient summary reviewed no history of anesthetic complications:   Airway: Mallampati: II  TM distance: >3 FB   Neck ROM: full  Mouth opening: > = 3 FB Dental: normal exam         Pulmonary: breath sounds clear to auscultation  (+) current smoker                           Cardiovascular:  Exercise tolerance: good (>4 METS),       (-) past MI and CAD    ECG reviewed  Rhythm: regular  Rate: normal                    Neuro/Psych:   (+) psychiatric history: stable with treatmentdepression/anxiety             GI/Hepatic/Renal: Neg GI/Hepatic/Renal ROS Endo/Other: Negative Endo/Other ROS                    Abdominal:           Vascular: negative vascular ROS. Anesthesia Plan      MAC     ASA 2       Induction: intravenous. Anesthetic plan and risks discussed with patient. Plan discussed with CRNA.                   Elma Lemus MD   5/16/2019    Chart reviewed and patient examined on May 16, 2019 at 2:51 PM by Elma Lemus MD.  (Final assessment and plan per day of surgery team.)

## 2019-05-16 NOTE — PROGRESS NOTES
Nutrition Assessment    Type and Reason for Visit: Initial, Positive Nutrition Screen    Nutrition Recommendations: Recommend and start Ensure Enlive supplement BID to help meet increased nutritional needs. Nutrition Assessment: Patient at nutritional risk AEB increased needs from wound healing ; will start nutritional supplementation     Malnutrition Assessment:  · Malnutrition Status: No malnutrition  · Context: Acute illness or injury  · Findings of the 6 clinical characteristics of malnutrition (Minimum of 2 out of 6 clinical characteristics is required to make the diagnosis of moderate or severe Protein Calorie Malnutrition based on AND/ASPEN Guidelines):  1. Energy Intake-(>75%), Greater than or equal to 5 days    2. Weight Loss-No significant weight loss,    3. Fat Loss-No significant subcutaneous fat loss,    4. Muscle Loss-No significant muscle mass loss,    5. Fluid Accumulation-No significant fluid accumulation,    6.   Strength-Not measured    Nutrition Risk Level: Low    Nutrient Needs:  · Estimated Daily Total Kcal: 4304-4664 (1505 x 1.2 SF)  · Estimated Daily Protein (g):  (1.2-1.4g/kg CBW)  · Estimated Daily Total Fluid (ml/day): 1172-7738    Nutrition Diagnosis:   · Problem: Increased nutrient needs  · Etiology: related to Increased demand for energy/nutrients     Signs and symptoms:  as evidenced by Presence of wounds    Objective Information:  · Nutrition-Focused Physical Findings: +I&Os (+1.6 L) ; 1+ edema ; GI WNL ; A&O x 4 ; redness to L foot ; appetite good     · Wound Type: Multiple, Open Wounds     · Current Nutrition Therapies:  · Oral Diet Orders: General   · Oral Diet intake: %(per flowsheets and per pt)  · Oral Nutrition Supplement (ONS) Orders: None     · Anthropometric Measures:  · Ht: 6' (182.9 cm)   · Current Body Wt: 169 lb (76.7 kg)(5/15/19, stated)  · Admission Body Wt: 169 lb (76.7 kg)(5/15/19, stated)  · Usual Body Wt: (unable to obtain)  ·   No weights available in EMR history   · Ideal Body Wt: 160 lb (72.6 kg), % Ideal Body 106%  · BMI Classification: BMI 18.5 - 24.9 Normal Weight    Nutrition Interventions:   Continue current diet, Start ONS  Continued Inpatient Monitoring, Coordination of Care    Nutrition Evaluation:   · Evaluation: Goals set   · Goals: Patient will consume >75% of most meals served     · Monitoring: Meal Intake, Supplement Intake, Diet Tolerance, Skin Integrity, Wound Healing, I&O, Monitor Hemodynamic Status, Monitor Bowel Function, Weight, Pertinent Labs      Electronically signed by Estela Guevara RD, LD on 5/16/19 at 9:37 AM    Contact Number: [phone]

## 2019-05-16 NOTE — PLAN OF CARE
Nutrition Problem: Increased nutrient needs  Intervention: Food and/or Nutrient Delivery: Continue current diet, Start ONS  Nutritional Goals: Patient will consume >75% of most meals served

## 2019-05-16 NOTE — PLAN OF CARE
Problem: Falls - Risk of:  Goal: Will remain free from falls  Description  Will remain free from falls  Outcome: Met This Shift     Problem: Pain:  Goal: Control of acute pain  Description  Control of acute pain  Outcome: Met This Shift     Problem: Mobility - Impaired:  Goal: Mobility will improve  Description  Mobility will improve  Outcome: Met This Shift     Problem:  Activity:  Goal: Ability to tolerate increased activity will improve  Description  Ability to tolerate increased activity will improve  Outcome: Met This Shift     Problem: Skin Integrity:  Goal: Complications related to intravenous access or infusion will be avoided or minimized  Description  Complications related to intravenous access or infusion will be avoided or minimized  Outcome: Met This Shift

## 2019-05-16 NOTE — FLOWSHEET NOTE
Inpatient Wound Care  Initial evaluation      Admit Date: 5/15/2019  5:24 PM    Reason for consult:  Left foot    Significant history:    Past Medical History:   Diagnosis Date    Anxiety     Depression        Wound history:      Findings:       05/16/19 1211   Wound 05/15/19 Foot Plantar; Inner;Left red, swollen, open ulceration   Date First Assessed/Time First Assessed: 05/15/19 1759   Present on Hospital Admission: Yes  Primary Wound Type: Other (comment)  Location: Foot  Wound Location Orientation: Plantar; Inner;Left  Wound Description (Comments): red, swollen, open ulceration   Wound Image    Dressing Changed Changed/New   Wound Length (cm) 5 cm   Wound Width (cm) 5 cm   Wound Surface Area (cm^2) 25 cm^2   Change in Wound Size % (l*w) 37.5   Drainage Amount Scant   Odor None   Natalie-wound Assessment Red   Wound 05/15/19 Foot Left   Date First Assessed/Time First Assessed: 05/15/19 2202   Present on Hospital Admission: Yes  Location: (c) Foot  Wound Location Orientation: Left   Dressing Changed Changed/New   Wound Length (cm) 1.5 cm   Wound Width (cm) 1.5 cm   Wound Surface Area (cm^2) 2.25 cm^2   Change in Wound Size % (l*w) 0   Drainage Amount None   Wound 05/15/19 Toe (Comment  which one) Left   Date First Assessed/Time First Assessed: 05/15/19 2216   Present on Hospital Admission: Yes  Location: (c) Toe (Comment  which one)  Wound Location Orientation: Left   Wound Length (cm) 1.5 cm   Wound Width (cm) 0.5 cm   Wound Surface Area (cm^2) 0.75 cm^2   Change in Wound Size % (l*w) 0   Wound 05/15/19 Foot Right   Date First Assessed/Time First Assessed: 05/15/19 2221   Present on Hospital Admission: Yes  Location: Foot  Wound Location Orientation: Right   Wound Length (cm) 1.5 cm   Wound Width (cm) 1 cm   Wound Surface Area (cm^2) 1.5 cm^2   Change in Wound Size % (l*w) 0     **Informed Consent**    The patient has given verbal consent to have photos taken of left foot and inserted into their chart as part of their permanent medical record for purposes of documentation, treatment management and/or medical review. All Images taken on 5/16/19 of patient name: Jay Moctezuma were transmitted and stored on secured Green Plug located [facility] by a registered [employer] Mobile Application Device.        Impression:      Interventions in place:  Silver dressing    Plan:  Cont silver dressing  Discussed need for ongoing wound care  Recommend homecare  Pt rosanne follow up with Dr Bassam Duran consult for wound healing      Laurie Rodriguez 5/16/2019 12:15 PM

## 2019-05-16 NOTE — CARE COORDINATION
SS NOTE: SW recd the Consult from Dr Bekah Gloria for LTAC vs SNF. SW will research and discuss with pt. SW will report results. Danay Darling. .5/16/2019.2:59 PM.

## 2019-05-16 NOTE — CONSULTS
[address] [place] [address] [facility]  Consult Note    [facility]  [place], [address]  Phone [phone]   [phone]      Admit Date: 5/15/2019  5:24 PM  Pt Name: Barrett Martini  MRN: [id number]  : 1970  Reason for Consult:    Chief Complaint   Patient presents with    Foot Injury     sent in from doctor for infection      Requesting Physician:  Elzbieta Parsons MD  PCP: Ector Pedersoner  History Obtained From:  patient  ID consulted for ATBX on hospital day 1  CHIEF COMPLAINT       Chief Complaint   Patient presents with    Foot Injury     sent in from doctor for infection      HISTORYOF PRESENT ILLNESS      Barrett Martini is a 51 y.o. female who presents with significant past medical history of  has a past medical history of Anxiety and Depression. who presents with   Chief Complaint   Patient presents with    Foot Injury     sent in from doctor for infection      ED TRIAGEVITALS  BP: (!) 154/82, Temp: 98 °F (36.7 °C), Pulse: 96, Resp: 18, SpO2: 98 %  HPI  PT FROM HOME WITH LEFT FOOT INFECTION. SHE WAS STOMPING ON HER LAWN EDGER AND DEVELOPED A SORE ON HER LEFT FOOT. SHE HAD ATTEMPTED TO TREAT HER FOOT WITH  BLEACH. SHE HAS A H/O DROP FOOT AND WEAR A SHOE. SHE DOES HAVE CALLUSES ON HER FOOT. SHE HAS BEEN SEEN BY DR MÁRQUEZ/ PODIATRY. SHE HAS NO F/C/N/V/D/RASH/ITCH. UJN54.2 CR0.5 PCO32  XRY 1. There is no appreciable soft tissue foreign body and there are no  plain film findings of osteomyelitis. 2. Minimal soft tissue emphysema is seen along the lateral cortex of  the first metatarsal. The findings are suggestive of a soft tissue  abscess.   CX PENDING   CURRENTLY ON CEFAZOLIN/VANCO  REVIEW OF SYSTEMS    (2-9 systems for level 4, 10 or more for level 5)     REVIEW OFSYSTEMS:    CONSTITUTIONAL:   No fever, chills, weight loss  ALLERGIES:    No urticaria, hay fever,    EYES:     No blurry vision, loss of vision,eye pain  ENT:      No hearing loss, sore throat  CARDIOVASCULAR:  No chest pain or palpitations  RESPIRATORY:   No cough, sob  ENDOCRINE:    No increase thirst, urination   HEME-LYMPH:   No easy bruising or bleeding  GI:     No nausea, vomiting or diarrhea  :     No urinary complaints  NEURO:    No seizures, stroke, HA  MUSCULOSKELETAL:  No muscle aches or pain, no jointpain  SKIN:     No rash or itch  PSYCH:    No depression or anxiety    CURRENT MEDICATIONS     Current Facility-Administered Medications:     folic acid (FOLVITE) tablet 1 mg, 1 mg, Oral, Daily, Nav Martinez APRN - CNP, 1 mg at 05/16/19 0931    gabapentin (NEURONTIN) capsule 300 mg, 300 mg, Oral, TID, Nav Martinez APRN - CNP, 300 mg at 05/16/19 0931    sertraline (ZOLOFT) tablet 50 mg, 50 mg, Oral, Nightly, Nav Martinez APRN - CNP, 50 mg at 05/15/19 2112    vitamin B-1 (THIAMINE) tablet 100 mg, 100 mg, Oral, Daily, MILLY Cuevas - CNP, 100 mg at 05/16/19 0931    sodium chloride flush 0.9 % injection 10 mL, 10 mL, Intravenous, 2 times per day, Nav Martinez APRN - CNP, 10 mL at 05/15/19 2113    sodium chloride flush 0.9 % injection 10 mL, 10 mL, Intravenous, PRN, Nav Martinez APRN - CNP    magnesium hydroxide (MILK OF MAGNESIA) 400 MG/5ML suspension 30 mL, 30 mL, Oral, Daily PRN, Nav Martinez APRN - CNP    ondansetron [facility]) injection 4 mg, 4 mg, Intravenous, Q6H PRN, Nav Martinez APRN - CNP    enoxaparin (LOVENOX) injection 40 mg, 40 mg, Subcutaneous, Daily, Dilanra Michelle APRN - CNP, 40 mg at 05/16/19 0931    0.9 % sodium chloride infusion, , Intravenous, Continuous, MILLY Cuevas - CNP, Last Rate: 125 mL/hr at 05/16/19 0330    acetaminophen (TYLENOL) tablet 650 mg, 650 mg, Oral, Q4H PRN, Nav Martinez APRN - CNP, 650 mg at 05/16/19 1112    ceFAZolin (ANCEF) 1 g in sterile water 10 mL IV syringe, 1 g, Intravenous, Q8H, MILLY Cuevas - CNP, 1 g at 05/16/19 0931    vancomycin (VANCOCIN) 1,500 mg in dextrose 5 % 300 mL IVPB, 1,500 mg, Intravenous, Q12H, MILLY Zaldivar CNP, Stopped at 05/16/19 0803  ALLERGIES     Patient has no known allergies. There is no immunization history on file for this patient. PAST MEDICAL HISTORY     Past Medical History:   Diagnosis Date    Anxiety     Depression      SURGICAL HISTORY       Past Surgical History:   Procedure Laterality Date    CYST REMOVAL      TUBAL LIGATION       FAMILY HISTORY     History reviewed. No pertinent family history. SOCIAL HISTORY       Social History     Socioeconomic History    Marital status: Single     Spouse name: None    Number of children: None    Years of education: None    Highest education level: None   Occupational History    None   Social Needs    Financial resource strain: None    Food insecurity:     Worry: None     Inability: None    Transportation needs:     Medical: None     Non-medical: None   Tobacco Use    Smoking status: Current Some Day Smoker     Packs/day: 0.50     Types: Cigarettes    Smokeless tobacco: Never Used   Substance and Sexual Activity    Alcohol use: Yes     Comment: 2-3 times weekly.      Drug use: No    Sexual activity: None   Lifestyle    Physical activity:     Days per week: None     Minutes per session: None    Stress: None   Relationships    Social connections:     Talks on phone: None     Gets together: None     Attends [employer] service: None     Active member of club or organization: None     Attends meetings of clubs or organizations: None     Relationship status: None    Intimate partner violence:     Fear of current or ex partner: None     Emotionally abused: None     Physically abused: None     Forced sexual activity: None   Other Topics Concern    None   Social History Narrative    None     ·     PHYSICAL EXAM    (up to 7 for level 4, 8 or more forlevel 5)     ED Triage Vitals [05/15/19 1720]   BP Temp Temp Source Pulse Resp SpO2 Height Weight   (!) 165/59 98.2 °F (36.8 °C) Oral 105 16 98 % 6' (1.829 m) 169 lb (76.7 kg)     Vitals:    Vitals:    05/15/19 2030 05/15/19 2049 05/16/19 0900 05/16/19 0932   BP: (!) 180/70 (!) 180/70 (!) 154/82    Pulse: 97 97 96    Resp: 18  18    Temp: 98 °F (36.7 °C)  98 °F (36.7 °C)    TempSrc: Oral      SpO2: 98%  98%    Weight:       Height:    6' (1.829 m)     Physical Exam   Constitutional/General: Alert and oriented, NAD  Head: NC/AT  Eyes: PERRL, EOMI  Mouth: Normal mucosa, no thrush   Neck: Supple, full ROM,    Pulmonary: Lungs clear to auscultation bilaterally. Not in respiratory distress  Cardiovascular:  Regular rate and rhythm, no murmurs, gallops, or rubs. Abdomen: Soft, + BS. No distension. Nontender. Extremities: Moves all extremities x 4. Warm and well perfused  Pulses:  Distal pulses intact  Skin: Warm and dry without rash, LEFT FOOT WITH PLANTAR CALLUSES, PLANTAR ASPECT DENUDED SKIN   Neurologic:    No focal deficits  Psych: Normal Affect     DIAGNOSTICRESULTS   RADIOLOGY:   Xr Tibia Fibula Left (2 Views)    Result Date: 5/15/2019  Reading location:  200 HISTORY: Wound on the left foot. TECHNIQUE: AP and lateral views of the left tibia and fibula were obtained. FINDINGS: There is no fracture of the tibia or fibula. No soft tissue foreign body is noted. 1. Unremarkable left tibia and fibula. Xr Foot Left (min 3 Views)    Result Date: 5/15/2019  Reading location:  200 HISTORY: Wound along the plantar aspect of the left foot. TECHNIQUE: 3 views of the left foot were obtained. FINDINGS: There is no fracture or dislocation of the left foot. Prominent soft tissue is seen along the plantar aspect of the midfoot. On the frontal view of the foot, there is subcutaneous emphysema seen along the lateral cortex of the first metatarsal. There is no periosteal reaction to suggest osteomyelitis. 1. There is no appreciable soft tissue foreign body and there are no plain film findings of osteomyelitis.  2. Minimal soft tissue emphysema is seen along the lateral cortex of the first metatarsal. The findings are suggestive of a soft tissue abscess. Mri Foot Left Wo Contrast    Result Date: 5/16/2019  Reading location:  200 Indication: Left foot ulcers, evaluate for osteomyelitis. Comparison: Left foot radiograph from 5/15/2019. Technique: Multiplanar, multisequence MR imaging of the left forefoot was performed without administration of intravenous contrast. FINDINGS: There is suggestion of multifocal areas of soft tissue irregularity within the medial soft tissues overlying the interphalangeal joint of the great toe, medial soft tissues overlying the mid shaft of the 1st metatarsal, and within the plantar soft tissues at the level of the 1st metatarsophalangeal joint. There is no discrete loculated collection to suggest an abscess. There is no confluent hypointense T1 marrow signal within the great toe or 1st metatarsal to suggest acute osteomyelitis. There is a small effusion within the 2nd metatarsophalangeal joint with a patchy area of edema within the 2nd metatarsal head. There is no evidence of acute fracture. There is mild arthrosis at the 1st tarsometatarsal joint with subchondral reactive change within the distal and dorsal aspect of the medial cuneiform. There is a trace effusion within the 1st metatarsophalangeal joint. The interosseous band of the Lisfranc ligament is grossly intact. There is diffuse increased signal and atrophy of the muscles of the forefoot, likely related to denervation. The visualized tendons are grossly intact. 1. Suggestion of soft tissue irregularity within the medial and plantar soft tissues are overlying the great toe and 1st metatarsal. No discrete collection is identified to suggest an abscess. 2. No confluent hypointense T1 marrow signal within the great toe or 1st metatarsal to suggest acute osteomyelitis.  3. Small 2nd MTP joint effusion with patchy edema within the 2nd metatarsal head, possibly CBC auto differential   Result Value Ref Range    WBC 8.7 4.5 - 11.5 E9/L    RBC 3.60 3.50 - 5.50 E12/L    Hemoglobin 11.0 (L) 11.5 - 15.5 g/dL    Hematocrit 33.7 (L) 34.0 - 48.0 %    MCV 93.6 80.0 - 99.9 fL    MCH 30.6 26.0 - 35.0 pg    MCHC 32.6 32.0 - 34.5 %    RDW 12.6 11.5 - 15.0 fL    Platelets 600 874 - 207 E9/L    MPV 9.7 7.0 - 12.0 fL    Neutrophils % 62.8 43.0 - 80.0 %    Immature Granulocytes % 0.9 0.0 - 5.0 %    Lymphocytes % 20.8 20.0 - 42.0 %    Monocytes % 11.9 2.0 - 12.0 %    Eosinophils % 2.9 0.0 - 6.0 %    Basophils % 0.7 0.0 - 2.0 %    Neutrophils # 5.49 1.80 - 7.30 E9/L    Immature Granulocytes # 0.08 E9/L    Lymphocytes # 1.82 1.50 - 4.00 E9/L    Monocytes # 1.04 (H) 0.10 - 0.95 E9/L    Eosinophils # 0.25 0.05 - 0.50 E9/L    Basophils # 0.06 0.00 - 0.20 I6/F   Basic Metabolic Panel w/ Reflex to MG   Result Value Ref Range    Sodium 139 132 - 146 mmol/L    Potassium reflex Magnesium 4.0 3.5 - 5.0 mmol/L    Chloride 106 98 - 107 mmol/L    CO2 21 (L) 22 - 29 mmol/L    Anion Gap 12 7 - 16 mmol/L    Glucose 104 (H) 74 - 99 mg/dL    BUN 11 6 - 20 mg/dL    CREATININE 0.5 0.5 - 1.0 mg/dL    GFR Non-African American >60 >=60 mL/min/1.73    GFR African American >60     Calcium 8.4 (L) 8.6 - 10.2 mg/dL   Lactic acid, plasma   Result Value Ref Range    Lactic Acid 1.2 0.5 - 2.2 mmol/L     MICROBIOLOGY:    Urine Culture, Routine   Date Value Ref Range Status   09/16/2014 <10,000 CFU/mL  Gram positive cocci    Final     Patient is a 51 y.o. female who presented with   Chief Complaint   Patient presents with    Foot Injury     sent in from doctor for infection         FINAL IMPRESSION    LEUKOCYTOSIS  1. Cellulitis of left foot    2. Abscess of foot        FOR I AND DEBRIDEMENT  CHECK CX   COVER FOR MRSA/PSEUDOMONAS  VANCO/CEFEPIME  WOUND CARE PER PODIATRY  HEMOGLOBIN A1C  MRI NOTED    Imaging and labs were reviewed per medical records and any ID pertinent labs were addressed with the patient.      An opportunity to ask questions was given to the patient/FAMILY and questions were answered. Thank you for the consult. We will follow with you.        Electronically signed by Zeyad Villatoro MD on 5/16/2019 at 1:24 PM

## 2019-05-16 NOTE — PROGRESS NOTES
Pharmacy Consultation Note  (Antibiotic Dosing and Monitoring)    Initial consult date: 5/15/19  Consulting physician: Maximus Mcclure CNP  Drug(s): Vancomycin  Indication: Cellulitis, abscess    Ht Readings from Last 1 Encounters:   05/15/19 6' (1.829 m)     Wt Readings from Last 1 Encounters:   05/15/19 169 lb (76.7 kg)         Age/  Gender IBW DW  Allergy Information   51 y.o.     female  76.7 kg  Patient has no known allergies. Date  WBC BUN/CR UOP Drug/Dose Time   Given Level(s)   (Time) Comments   5/15  (#1) 13.5 11/0.5 -- Vancomycin 1,500 mg IV x 1 1842       (#2)            (#3)            (#4)            (#5)            (#6)            (#7)            Estimated Creatinine Clearance: 157 mL/min (based on SCr of 0.5 mg/dL). UOP over the past 24 hours:     No intake or output data in the 24 hours ending 05/15/19 2128    Temp max: Temp (24hrs), Av.2 °F (36.8 °C), Min:98 °F (36.7 °C), Max:98.3 °F (36.8 °C)      Antibiotic Regimen:  Antibiotic Dose Date Initiated   Cefazolin 1 g Q8H 5/15     Cultures:  available culture and sensitivity results were reviewed in EPIC  Cultures sent and are pending. Culture Date Result             Assessment:  · Consulted by Maximus Mcclure CNP to dose/monitor vancomycin  · Goal trough level:  15-20 mcg/mL  · Pt is a 51 yof admitted with cellulitis and abscess of the foot, no history of diabetes.   · Serum creatinine today: 0.5; CrCl >100 mL/min; baseline Scr ~ 0.4 (), no recent labs  · Patient received Vancomycin 1,500 mg IV x 1 in ED    Plan:  · Vancomycin 1,500 mg IV Q12H  · Check trough at steady state  · Follow renal function  · Pharmacist will follow and monitor/adjust dosing as necessary      Thank you for the consult,    Adis Yousif, Miah, BCPS 5/15/2019 9:35 PM   [phone]

## 2019-05-16 NOTE — PLAN OF CARE
Problem: Falls - Risk of:  Goal: Will remain free from falls  Description  Will remain free from falls  Outcome: Met This Shift  Goal: Absence of physical injury  Description  Absence of physical injury  Outcome: Met This Shift     Problem: Pain:  Goal: Pain level will decrease  Description  Pain level will decrease  Outcome: Met This Shift  Goal: Control of acute pain  Description  Control of acute pain  Outcome: Met This Shift  Goal: Control of chronic pain  Description  Control of chronic pain  Outcome: Met This Shift     Problem: Mobility - Impaired:  Goal: Mobility will improve  Description  Mobility will improve  Outcome: Met This Shift     Problem:  Activity:  Goal: Ability to tolerate increased activity will improve  Description  Ability to tolerate increased activity will improve  Outcome: Met This Shift     Problem: Skin Integrity:  Goal: Demonstration of wound healing without infection will improve  Description  Demonstration of wound healing without infection will improve  Outcome: Met This Shift  Goal: Complications related to intravenous access or infusion will be avoided or minimized  Description  Complications related to intravenous access or infusion will be avoided or minimized  Outcome: Met This Shift

## 2019-05-16 NOTE — PROGRESS NOTES
Department of Podiatry  Resident Progress Note    Subjective  Patient seen bedside for left foot infection. Pt admits she is anxious about her left foot. Pt denies any pain at this time. No acute events overnight. Patient denies any N/V/D/F/C/SOB/CP and has no other complaints at this time. Objective    Past Medical History:   Diagnosis Date    Anxiety     Depression        Past Surgical History:   Procedure Laterality Date    CYST REMOVAL      TUBAL LIGATION         History reviewed. No pertinent family history. No Known Allergies    Vascular: DP and PT pulses 2/4 b/l. CFT <5 secs b/l. Skin temperature warm to cool from proximal to distal.   Neuro: Protective Sensation diminished b/l. Derm: Skin appears supple. Hyperkeratotic tissue noted to the plantar aspect of the 1st MPJ and medial aspect of the hallux of the right foot. Toenails 1-5 b/l are dystrophic. Webspaces 1-4 b/l are C/D/I. Musculoskeletal: Muscle Strength 5/5 for all groups b/l. Wound Care Documentation:  Wound 05/15/19 Foot Plantar; Inner;Left red, swollen, open ulceration (Active)   Wound Image   5/16/2019 12:11 PM   Wound Traumatic 5/16/2019  8:00 AM   Dressing Status Dry; Intact; Old drainage 5/16/2019  8:00 AM   Dressing Changed Changed/New 5/16/2019 12:11 PM   Dressing/Treatment Dry Dressing 5/16/2019  8:00 AM   Wound Length (cm) 5 cm 5/16/2019 12:11 PM   Wound Width (cm) 5 cm 5/16/2019 12:11 PM   Wound Surface Area (cm^2) 25 cm^2 5/16/2019 12:11 PM   Change in Wound Size % (l*w) 37.5 5/16/2019 12:11 PM   Drainage Amount Scant 5/16/2019 12:11 PM   Odor None 5/16/2019 12:11 PM   Natalie-wound Assessment Red 5/16/2019 12:11 PM   Number of days: 0       Wound 05/15/19 Foot Left (Active)   Wound Traumatic 5/16/2019  8:00 AM   Dressing Status Dry; Intact; Old drainage 5/16/2019  8:00 AM   Dressing Changed Changed/New 5/16/2019 12:11 PM   Dressing/Treatment Dry Dressing 5/16/2019  8:00 AM   Wound Length (cm) 1.5 cm 5/16/2019 12:11 PM   Wound Width (cm) 1.5 cm 5/16/2019 12:11 PM   Wound Surface Area (cm^2) 2.25 cm^2 5/16/2019 12:11 PM   Change in Wound Size % (l*w) 0 5/16/2019 12:11 PM   Drainage Amount None 5/16/2019 12:11 PM   Odor None 5/16/2019  8:00 AM   Number of days: 0       Wound 05/15/19 Toe (Comment  which one) Left (Active)   Wound Traumatic 5/16/2019  8:00 AM   Dressing Status Dry; Intact; Old drainage 5/16/2019  8:00 AM   Dressing Changed Changed/New 5/16/2019 11:58 AM   Dressing/Treatment Dry Dressing 5/16/2019  8:00 AM   Wound Length (cm) 1.5 cm 5/16/2019 12:11 PM   Wound Width (cm) 0.5 cm 5/16/2019 12:11 PM   Wound Surface Area (cm^2) 0.75 cm^2 5/16/2019 12:11 PM   Change in Wound Size % (l*w) 0 5/16/2019 12:11 PM   Drainage Amount Small 5/16/2019  8:00 AM   Odor None 5/16/2019  8:00 AM   Number of days: 0       Wound 05/15/19 Foot Right (Active)   Wound Traumatic 5/16/2019  8:00 AM   Dressing/Treatment Open to air 5/16/2019  8:00 AM   Wound Length (cm) 1.5 cm 5/16/2019 12:11 PM   Wound Width (cm) 1 cm 5/16/2019 12:11 PM   Wound Surface Area (cm^2) 1.5 cm^2 5/16/2019 12:11 PM   Change in Wound Size % (l*w) 0 5/16/2019 12:11 PM   Drainage Amount None 5/16/2019  8:00 AM   Odor None 5/16/2019  8:00 AM   Number of days: 0       Wound 05/15/19 Toe (Comment  which one) Right (Active)   Wound Traumatic 5/16/2019  8:00 AM   Dressing/Treatment Open to air 5/16/2019  8:00 AM   Wound Length (cm) 1.5 cm 5/15/2019 10:15 PM   Wound Width (cm) 1 cm 5/15/2019 10:15 PM   Wound Surface Area (cm^2) 1.5 cm^2 5/15/2019 10:15 PM   Drainage Amount None 5/16/2019  8:00 AM   Odor None 5/16/2019  8:00 AM   Number of days: 0       Wound 05/15/19 Finger (Comment which one) Left (Active)   Dressing Status Clean;Dry; Intact 5/16/2019  8:00 AM   Dressing/Treatment Adhesive bandage 5/16/2019  8:00 AM   Wound Length (cm) 1 cm 5/15/2019 10:15 PM   Wound Width (cm) 0.5 cm 5/15/2019 10:15 PM   Wound Surface Area (cm^2) 0.5 cm^2 5/15/2019 10:15 PM   Drainage Amount None 5/16/2019  8:00 AM   Odor None 5/16/2019  8:00 AM   Number of days: 0       CBC with Differential:    Lab Results   Component Value Date    WBC 8.7 05/16/2019    RBC 3.60 05/16/2019    HGB 11.0 05/16/2019    HCT 33.7 05/16/2019     05/16/2019    MCV 93.6 05/16/2019    MCH 30.6 05/16/2019    MCHC 32.6 05/16/2019    RDW 12.6 05/16/2019    LYMPHOPCT 20.8 05/16/2019    MONOPCT 11.9 05/16/2019    BASOPCT 0.7 05/16/2019    MONOSABS 1.04 05/16/2019    LYMPHSABS 1.82 05/16/2019    EOSABS 0.25 05/16/2019    BASOSABS 0.06 05/16/2019     BMP:    Lab Results   Component Value Date     05/16/2019    K 4.0 05/16/2019     05/16/2019    CO2 21 05/16/2019    BUN 11 05/16/2019    LABALBU 3.0 05/10/2015    CREATININE 0.5 05/16/2019    CALCIUM 8.4 05/16/2019    GFRAA >60 05/16/2019    LABGLOM >60 05/16/2019    GLUCOSE 104 05/16/2019       Scheduled Meds:   folic acid  1 mg Oral Daily    gabapentin  300 mg Oral TID    sertraline  50 mg Oral Nightly    vitamin B-1  100 mg Oral Daily    sodium chloride flush  10 mL Intravenous 2 times per day    enoxaparin  40 mg Subcutaneous Daily    ceFAZolin  1 g Intravenous Q8H    vancomycin  1,500 mg Intravenous Q12H     Continuous Infusions:   sodium chloride 125 mL/hr at 05/16/19 0330     PRN Meds:.sodium chloride flush, magnesium hydroxide, ondansetron, acetaminophen    Diagnostics  XR FOOT LEFT (MIN 3 VIEWS)   Status: Final result   Order Providers     Omero Ocasio MD          Signed by     Signed Date/Time  Phone Pager   Isamar Luz 5/15/2019 18:36 [phone]    [facility]     Read Date Phone Pager   Isamar Luz May 15, 2019 [phone]    Radiation Dose Estimates     No radiation information found for this patient   Narrative   Reading location:  200       HISTORY: Wound along the plantar aspect of the left foot.       TECHNIQUE: 3 views of the left foot were obtained.        FINDINGS: There is no fracture or dislocation of the left foot. Prominent soft tissue is seen along the plantar aspect of the midfoot.       On the frontal view of the foot, there is subcutaneous emphysema seen   along the lateral cortex of the first metatarsal. There is no   periosteal reaction to suggest osteomyelitis.           Impression   1. There is no appreciable soft tissue foreign body and there are no   plain film findings of osteomyelitis. 2. Minimal soft tissue emphysema is seen along the lateral cortex of   the first metatarsal. The findings are suggestive of a soft tissue   abscess.           MRI FOOT LEFT WO CONTRAST   Status: Final result   Order Providers     Authorizing Billing   Tim Lemon MD          Signed by     Signed Date/Time  Phone Pager   Barry Luke 5/16/2019 09:05 [phone]    [facility]     Read Date Phone Pager   Barry Luke May 16, 2019 [phone]    Orders Requiring a Screening Form     Procedure Order Status Form Status    MRI FOOT LEFT WO CONTRAST Completed Created   Radiation Dose Estimates     No radiation information found for this patient   Narrative   Reading location:  200       Indication: Left foot ulcers, evaluate for osteomyelitis.       Comparison: Left foot radiograph from 5/15/2019.       Technique: Multiplanar, multisequence MR imaging of the left forefoot   was performed without administration of intravenous contrast.       FINDINGS:       There is suggestion of multifocal areas of soft tissue irregularity   within the medial soft tissues overlying the interphalangeal joint of   the great toe, medial soft tissues overlying the mid shaft of the 1st   metatarsal, and within the plantar soft tissues at the level of the   1st metatarsophalangeal joint. There is no discrete loculated   collection to suggest an abscess.  There is no confluent hypointense T1   marrow signal within the great toe or 1st metatarsal to suggest acute   osteomyelitis.       There is a small effusion within the 2nd metatarsophalangeal joint   with a patchy area of edema within the 2nd metatarsal head.       There is no evidence of acute fracture. There is mild arthrosis at the   1st tarsometatarsal joint with subchondral reactive change within the   distal and dorsal aspect of the medial cuneiform.       There is a trace effusion within the 1st metatarsophalangeal joint.       The interosseous band of the Lisfranc ligament is grossly intact. There is diffuse increased signal and atrophy of the muscles of the   forefoot, likely related to denervation. The visualized tendons are   grossly intact.           Impression   1. Suggestion of soft tissue irregularity within the medial and   plantar soft tissues are overlying the great toe and 1st metatarsal.   No discrete collection is identified to suggest an abscess. 2. No confluent hypointense T1 marrow signal within the great toe or   1st metatarsal to suggest acute osteomyelitis. 3. Small 2nd MTP joint effusion with patchy edema within the 2nd   metatarsal head, possibly reactive, although early changes of   osteomyelitis may have a similar appearance. 4. Mild arthrosis at the 1st TMT joint.             Assessment  1. Left foot wound  2. Cellulitis and abscess of foot  3. Leukocytosis         Plan  - Patient was examined and evaluated. - Reviewed patient's recent lab results and pertinent diagnostic imaging.  - Reviewed ancillary service notes. -Xray L foot shows gas on the lateral aspect of the 1st met  - MRI L foot WO contrast OM 1st metatarsal  - Daily dressings for alginate Ag+, 4x4, ABD and kerlix  -Current antibiotics-Vanco/ancef  - Incision and drainage left foot abscess with debridement and packing and possible wound vac tomorrow 05/17/19  -NPO after midngiht  - Discussed patient with Dr. Silvia Ledesma DPM.  - Will continue to follow patient while they are in-house.

## 2019-05-17 ENCOUNTER — ANESTHESIA (OUTPATIENT)
Dept: OPERATING ROOM | Age: 49
DRG: 571 | End: 2019-05-17
Payer: MEDICARE

## 2019-05-17 VITALS
DIASTOLIC BLOOD PRESSURE: 60 MMHG | OXYGEN SATURATION: 100 % | RESPIRATION RATE: 12 BRPM | SYSTOLIC BLOOD PRESSURE: 110 MMHG

## 2019-05-17 LAB
ANION GAP SERPL CALCULATED.3IONS-SCNC: 9 MMOL/L (ref 7–16)
BUN BLDV-MCNC: 8 MG/DL (ref 6–20)
CALCIUM SERPL-MCNC: 8.5 MG/DL (ref 8.6–10.2)
CHLORIDE BLD-SCNC: 106 MMOL/L (ref 98–107)
CO2: 24 MMOL/L (ref 22–29)
CREAT SERPL-MCNC: 0.4 MG/DL (ref 0.5–1)
GFR AFRICAN AMERICAN: >60
GFR NON-AFRICAN AMERICAN: >60 ML/MIN/1.73
GLUCOSE BLD-MCNC: 104 MG/DL (ref 74–99)
HBA1C MFR BLD: 4.6 % (ref 4–5.6)
HCT VFR BLD CALC: 34.5 % (ref 34–48)
HEMOGLOBIN: 11.4 G/DL (ref 11.5–15.5)
MCH RBC QN AUTO: 30.6 PG (ref 26–35)
MCHC RBC AUTO-ENTMCNC: 33 % (ref 32–34.5)
MCV RBC AUTO: 92.7 FL (ref 80–99.9)
PDW BLD-RTO: 12.3 FL (ref 11.5–15)
PLATELET # BLD: 260 E9/L (ref 130–450)
PMV BLD AUTO: 9.8 FL (ref 7–12)
POTASSIUM SERPL-SCNC: 4.2 MMOL/L (ref 3.5–5)
RBC # BLD: 3.72 E12/L (ref 3.5–5.5)
SODIUM BLD-SCNC: 139 MMOL/L (ref 132–146)
VANCOMYCIN TROUGH: 17.7 MCG/ML (ref 5–16)
WBC # BLD: 8 E9/L (ref 4.5–11.5)

## 2019-05-17 PROCEDURE — 2580000003 HC RX 258: Performed by: NURSE ANESTHETIST, CERTIFIED REGISTERED

## 2019-05-17 PROCEDURE — 0JBR0ZZ EXCISION OF LEFT FOOT SUBCUTANEOUS TISSUE AND FASCIA, OPEN APPROACH: ICD-10-PCS | Performed by: PODIATRIST

## 2019-05-17 PROCEDURE — 87075 CULTR BACTERIA EXCEPT BLOOD: CPT

## 2019-05-17 PROCEDURE — 3600000002 HC SURGERY LEVEL 2 BASE: Performed by: PODIATRIST

## 2019-05-17 PROCEDURE — 99232 SBSQ HOSP IP/OBS MODERATE 35: CPT | Performed by: INTERNAL MEDICINE

## 2019-05-17 PROCEDURE — 3700000001 HC ADD 15 MINUTES (ANESTHESIA): Performed by: PODIATRIST

## 2019-05-17 PROCEDURE — 2580000003 HC RX 258: Performed by: STUDENT IN AN ORGANIZED HEALTH CARE EDUCATION/TRAINING PROGRAM

## 2019-05-17 PROCEDURE — 6360000002 HC RX W HCPCS

## 2019-05-17 PROCEDURE — 97165 OT EVAL LOW COMPLEX 30 MIN: CPT

## 2019-05-17 PROCEDURE — 87205 SMEAR GRAM STAIN: CPT

## 2019-05-17 PROCEDURE — 36415 COLL VENOUS BLD VENIPUNCTURE: CPT

## 2019-05-17 PROCEDURE — 2580000003 HC RX 258: Performed by: NURSE PRACTITIONER

## 2019-05-17 PROCEDURE — 6360000002 HC RX W HCPCS: Performed by: PODIATRIST

## 2019-05-17 PROCEDURE — 85027 COMPLETE CBC AUTOMATED: CPT

## 2019-05-17 PROCEDURE — 6360000002 HC RX W HCPCS: Performed by: SPECIALIST

## 2019-05-17 PROCEDURE — 6360000002 HC RX W HCPCS: Performed by: ANESTHESIOLOGY

## 2019-05-17 PROCEDURE — 6360000002 HC RX W HCPCS: Performed by: NURSE PRACTITIONER

## 2019-05-17 PROCEDURE — APPSS30 APP SPLIT SHARED TIME 16-30 MINUTES: Performed by: NURSE PRACTITIONER

## 2019-05-17 PROCEDURE — 80202 ASSAY OF VANCOMYCIN: CPT

## 2019-05-17 PROCEDURE — 6370000000 HC RX 637 (ALT 250 FOR IP): Performed by: NURSE PRACTITIONER

## 2019-05-17 PROCEDURE — 2500000003 HC RX 250 WO HCPCS: Performed by: PODIATRIST

## 2019-05-17 PROCEDURE — 2709999900 HC NON-CHARGEABLE SUPPLY: Performed by: PODIATRIST

## 2019-05-17 PROCEDURE — 97530 THERAPEUTIC ACTIVITIES: CPT

## 2019-05-17 PROCEDURE — 97161 PT EVAL LOW COMPLEX 20 MIN: CPT | Performed by: PHYSICAL THERAPIST

## 2019-05-17 PROCEDURE — 87077 CULTURE AEROBIC IDENTIFY: CPT

## 2019-05-17 PROCEDURE — 6360000002 HC RX W HCPCS: Performed by: STUDENT IN AN ORGANIZED HEALTH CARE EDUCATION/TRAINING PROGRAM

## 2019-05-17 PROCEDURE — 1200000000 HC SEMI PRIVATE

## 2019-05-17 PROCEDURE — 87070 CULTURE OTHR SPECIMN AEROBIC: CPT

## 2019-05-17 PROCEDURE — 97530 THERAPEUTIC ACTIVITIES: CPT | Performed by: PHYSICAL THERAPIST

## 2019-05-17 PROCEDURE — 7100000000 HC PACU RECOVERY - FIRST 15 MIN: Performed by: PODIATRIST

## 2019-05-17 PROCEDURE — 87186 SC STD MICRODIL/AGAR DIL: CPT

## 2019-05-17 PROCEDURE — 80048 BASIC METABOLIC PNL TOTAL CA: CPT

## 2019-05-17 PROCEDURE — 7100000001 HC PACU RECOVERY - ADDTL 15 MIN: Performed by: PODIATRIST

## 2019-05-17 PROCEDURE — 3700000000 HC ANESTHESIA ATTENDED CARE: Performed by: PODIATRIST

## 2019-05-17 PROCEDURE — 3600000012 HC SURGERY LEVEL 2 ADDTL 15MIN: Performed by: PODIATRIST

## 2019-05-17 PROCEDURE — 97116 GAIT TRAINING THERAPY: CPT | Performed by: PHYSICAL THERAPIST

## 2019-05-17 PROCEDURE — 6360000002 HC RX W HCPCS: Performed by: NURSE ANESTHETIST, CERTIFIED REGISTERED

## 2019-05-17 PROCEDURE — 83036 HEMOGLOBIN GLYCOSYLATED A1C: CPT

## 2019-05-17 PROCEDURE — 6370000000 HC RX 637 (ALT 250 FOR IP): Performed by: STUDENT IN AN ORGANIZED HEALTH CARE EDUCATION/TRAINING PROGRAM

## 2019-05-17 PROCEDURE — 2580000003 HC RX 258: Performed by: SPECIALIST

## 2019-05-17 RX ORDER — SODIUM CHLORIDE 0.9 % (FLUSH) 0.9 %
10 SYRINGE (ML) INJECTION PRN
Status: DISCONTINUED | OUTPATIENT
Start: 2019-05-17 | End: 2019-05-21 | Stop reason: HOSPADM

## 2019-05-17 RX ORDER — SODIUM CHLORIDE, SODIUM LACTATE, POTASSIUM CHLORIDE, CALCIUM CHLORIDE 600; 310; 30; 20 MG/100ML; MG/100ML; MG/100ML; MG/100ML
INJECTION, SOLUTION INTRAVENOUS CONTINUOUS PRN
Status: DISCONTINUED | OUTPATIENT
Start: 2019-05-17 | End: 2019-05-17 | Stop reason: SDUPTHER

## 2019-05-17 RX ORDER — LIDOCAINE HYDROCHLORIDE 20 MG/ML
INJECTION, SOLUTION EPIDURAL; INFILTRATION; INTRACAUDAL; PERINEURAL PRN
Status: DISCONTINUED | OUTPATIENT
Start: 2019-05-17 | End: 2019-05-17 | Stop reason: ALTCHOICE

## 2019-05-17 RX ORDER — FENTANYL CITRATE 50 UG/ML
25 INJECTION, SOLUTION INTRAMUSCULAR; INTRAVENOUS EVERY 5 MIN PRN
Status: COMPLETED | OUTPATIENT
Start: 2019-05-17 | End: 2019-05-17

## 2019-05-17 RX ORDER — FENTANYL CITRATE 50 UG/ML
50 INJECTION, SOLUTION INTRAMUSCULAR; INTRAVENOUS EVERY 5 MIN PRN
Status: DISCONTINUED | OUTPATIENT
Start: 2019-05-17 | End: 2019-05-21 | Stop reason: HOSPADM

## 2019-05-17 RX ORDER — GENTAMICIN SULFATE 80 MG/50ML
INJECTION, SOLUTION INTRAVENOUS CONTINUOUS PRN
Status: COMPLETED | OUTPATIENT
Start: 2019-05-17 | End: 2019-05-17

## 2019-05-17 RX ORDER — PROPOFOL 10 MG/ML
INJECTION, EMULSION INTRAVENOUS PRN
Status: DISCONTINUED | OUTPATIENT
Start: 2019-05-17 | End: 2019-05-17 | Stop reason: SDUPTHER

## 2019-05-17 RX ORDER — OXYCODONE HYDROCHLORIDE AND ACETAMINOPHEN 5; 325 MG/1; MG/1
1 TABLET ORAL EVERY 4 HOURS PRN
Status: DISCONTINUED | OUTPATIENT
Start: 2019-05-17 | End: 2019-05-21 | Stop reason: HOSPADM

## 2019-05-17 RX ORDER — PROPOFOL 10 MG/ML
INJECTION, EMULSION INTRAVENOUS CONTINUOUS PRN
Status: DISCONTINUED | OUTPATIENT
Start: 2019-05-17 | End: 2019-05-17 | Stop reason: SDUPTHER

## 2019-05-17 RX ORDER — SODIUM CHLORIDE 0.9 % (FLUSH) 0.9 %
10 SYRINGE (ML) INJECTION EVERY 12 HOURS SCHEDULED
Status: DISCONTINUED | OUTPATIENT
Start: 2019-05-17 | End: 2019-05-21 | Stop reason: HOSPADM

## 2019-05-17 RX ORDER — OXYCODONE HYDROCHLORIDE AND ACETAMINOPHEN 5; 325 MG/1; MG/1
2 TABLET ORAL EVERY 4 HOURS PRN
Status: DISCONTINUED | OUTPATIENT
Start: 2019-05-17 | End: 2019-05-21 | Stop reason: HOSPADM

## 2019-05-17 RX ORDER — MIDAZOLAM HYDROCHLORIDE 1 MG/ML
INJECTION INTRAMUSCULAR; INTRAVENOUS PRN
Status: DISCONTINUED | OUTPATIENT
Start: 2019-05-17 | End: 2019-05-17 | Stop reason: SDUPTHER

## 2019-05-17 RX ORDER — GENTAMICIN SULFATE 80 MG/50ML
80 INJECTION, SOLUTION INTRAVENOUS DAILY
Status: DISCONTINUED | OUTPATIENT
Start: 2019-05-18 | End: 2019-05-17 | Stop reason: CLARIF

## 2019-05-17 RX ORDER — FENTANYL CITRATE 50 UG/ML
INJECTION, SOLUTION INTRAMUSCULAR; INTRAVENOUS PRN
Status: DISCONTINUED | OUTPATIENT
Start: 2019-05-17 | End: 2019-05-17 | Stop reason: SDUPTHER

## 2019-05-17 RX ORDER — FENTANYL CITRATE 50 UG/ML
INJECTION, SOLUTION INTRAMUSCULAR; INTRAVENOUS
Status: COMPLETED
Start: 2019-05-17 | End: 2019-05-17

## 2019-05-17 RX ADMIN — CEFEPIME 2 G: 2 INJECTION, POWDER, FOR SOLUTION INTRAVENOUS at 18:53

## 2019-05-17 RX ADMIN — KETOROLAC TROMETHAMINE 15 MG: 15 INJECTION, SOLUTION INTRAMUSCULAR; INTRAVENOUS at 05:49

## 2019-05-17 RX ADMIN — VANCOMYCIN HYDROCHLORIDE 1250 MG: 10 INJECTION, POWDER, LYOPHILIZED, FOR SOLUTION INTRAVENOUS at 22:51

## 2019-05-17 RX ADMIN — FENTANYL CITRATE 25 MCG: 50 INJECTION INTRAMUSCULAR; INTRAVENOUS at 14:20

## 2019-05-17 RX ADMIN — FENTANYL CITRATE 25 MCG: 50 INJECTION INTRAMUSCULAR; INTRAVENOUS at 14:10

## 2019-05-17 RX ADMIN — GABAPENTIN 300 MG: 300 CAPSULE ORAL at 15:00

## 2019-05-17 RX ADMIN — FENTANYL CITRATE 50 MCG: 50 INJECTION, SOLUTION INTRAMUSCULAR; INTRAVENOUS at 13:43

## 2019-05-17 RX ADMIN — FENTANYL CITRATE 50 MCG: 50 INJECTION, SOLUTION INTRAMUSCULAR; INTRAVENOUS at 13:58

## 2019-05-17 RX ADMIN — SERTRALINE HYDROCHLORIDE 50 MG: 50 TABLET ORAL at 20:01

## 2019-05-17 RX ADMIN — OXYCODONE HYDROCHLORIDE AND ACETAMINOPHEN 2 TABLET: 5; 325 TABLET ORAL at 15:12

## 2019-05-17 RX ADMIN — GABAPENTIN 300 MG: 300 CAPSULE ORAL at 20:01

## 2019-05-17 RX ADMIN — VANCOMYCIN HYDROCHLORIDE 1250 MG: 10 INJECTION, POWDER, LYOPHILIZED, FOR SOLUTION INTRAVENOUS at 15:00

## 2019-05-17 RX ADMIN — FENTANYL CITRATE 25 MCG: 50 INJECTION INTRAMUSCULAR; INTRAVENOUS at 14:30

## 2019-05-17 RX ADMIN — MIDAZOLAM 2 MG: 1 INJECTION INTRAMUSCULAR; INTRAVENOUS at 13:04

## 2019-05-17 RX ADMIN — FENTANYL CITRATE 50 MCG: 50 INJECTION, SOLUTION INTRAMUSCULAR; INTRAVENOUS at 13:11

## 2019-05-17 RX ADMIN — OXYCODONE HYDROCHLORIDE AND ACETAMINOPHEN 2 TABLET: 5; 325 TABLET ORAL at 19:22

## 2019-05-17 RX ADMIN — CEFEPIME 2 G: 2 INJECTION, POWDER, FOR SOLUTION INTRAVENOUS at 06:46

## 2019-05-17 RX ADMIN — FENTANYL CITRATE 50 MCG: 50 INJECTION, SOLUTION INTRAMUSCULAR; INTRAVENOUS at 14:05

## 2019-05-17 RX ADMIN — FENTANYL CITRATE 50 MCG: 50 INJECTION INTRAMUSCULAR; INTRAVENOUS at 14:05

## 2019-05-17 RX ADMIN — MELATONIN TAB 3 MG 3 MG: 3 TAB at 20:01

## 2019-05-17 RX ADMIN — FENTANYL CITRATE 25 MCG: 50 INJECTION INTRAMUSCULAR; INTRAVENOUS at 14:25

## 2019-05-17 RX ADMIN — PROPOFOL 120 MCG/KG/MIN: 10 INJECTION, EMULSION INTRAVENOUS at 13:08

## 2019-05-17 RX ADMIN — FENTANYL CITRATE 50 MCG: 50 INJECTION INTRAMUSCULAR; INTRAVENOUS at 13:58

## 2019-05-17 RX ADMIN — VANCOMYCIN HYDROCHLORIDE 1250 MG: 10 INJECTION, POWDER, LYOPHILIZED, FOR SOLUTION INTRAVENOUS at 05:01

## 2019-05-17 RX ADMIN — PROPOFOL 50 MG: 10 INJECTION, EMULSION INTRAVENOUS at 13:16

## 2019-05-17 RX ADMIN — FENTANYL CITRATE 50 MCG: 50 INJECTION, SOLUTION INTRAMUSCULAR; INTRAVENOUS at 13:28

## 2019-05-17 RX ADMIN — SODIUM CHLORIDE, POTASSIUM CHLORIDE, SODIUM LACTATE AND CALCIUM CHLORIDE: 600; 310; 30; 20 INJECTION, SOLUTION INTRAVENOUS at 13:04

## 2019-05-17 RX ADMIN — PROPOFOL 70 MG: 10 INJECTION, EMULSION INTRAVENOUS at 13:08

## 2019-05-17 RX ADMIN — FENTANYL CITRATE 50 MCG: 50 INJECTION, SOLUTION INTRAMUSCULAR; INTRAVENOUS at 13:08

## 2019-05-17 ASSESSMENT — PAIN DESCRIPTION - FREQUENCY
FREQUENCY: CONTINUOUS

## 2019-05-17 ASSESSMENT — PULMONARY FUNCTION TESTS
PIF_VALUE: 1
PIF_VALUE: 0
PIF_VALUE: 1
PIF_VALUE: 0
PIF_VALUE: 1
PIF_VALUE: 0
PIF_VALUE: 0
PIF_VALUE: 1
PIF_VALUE: 0
PIF_VALUE: 1
PIF_VALUE: 0
PIF_VALUE: 1
PIF_VALUE: 0
PIF_VALUE: 1
PIF_VALUE: 0
PIF_VALUE: 1

## 2019-05-17 ASSESSMENT — PAIN DESCRIPTION - LOCATION
LOCATION: FOOT

## 2019-05-17 ASSESSMENT — PAIN SCALES - GENERAL
PAINLEVEL_OUTOF10: 6
PAINLEVEL_OUTOF10: 5
PAINLEVEL_OUTOF10: 8
PAINLEVEL_OUTOF10: 6
PAINLEVEL_OUTOF10: 6
PAINLEVEL_OUTOF10: 4
PAINLEVEL_OUTOF10: 5
PAINLEVEL_OUTOF10: 8
PAINLEVEL_OUTOF10: 5
PAINLEVEL_OUTOF10: 0
PAINLEVEL_OUTOF10: 3

## 2019-05-17 ASSESSMENT — PAIN DESCRIPTION - ORIENTATION
ORIENTATION: LEFT

## 2019-05-17 ASSESSMENT — PAIN DESCRIPTION - PAIN TYPE
TYPE: SURGICAL PAIN
TYPE: SURGICAL PAIN
TYPE: ACUTE PAIN
TYPE: SURGICAL PAIN
TYPE: ACUTE PAIN;SURGICAL PAIN

## 2019-05-17 ASSESSMENT — PAIN DESCRIPTION - ONSET
ONSET: ON-GOING
ONSET: ON-GOING

## 2019-05-17 ASSESSMENT — PAIN DESCRIPTION - PROGRESSION
CLINICAL_PROGRESSION: GRADUALLY IMPROVING
CLINICAL_PROGRESSION: GRADUALLY WORSENING
CLINICAL_PROGRESSION: GRADUALLY IMPROVING
CLINICAL_PROGRESSION: RAPIDLY WORSENING
CLINICAL_PROGRESSION: NOT CHANGED
CLINICAL_PROGRESSION: GRADUALLY IMPROVING
CLINICAL_PROGRESSION: GRADUALLY IMPROVING

## 2019-05-17 ASSESSMENT — PAIN DESCRIPTION - DESCRIPTORS
DESCRIPTORS: ACHING;DISCOMFORT;SORE
DESCRIPTORS: ACHING;THROBBING

## 2019-05-17 ASSESSMENT — PAIN - FUNCTIONAL ASSESSMENT: PAIN_FUNCTIONAL_ASSESSMENT: PREVENTS OR INTERFERES SOME ACTIVE ACTIVITIES AND ADLS

## 2019-05-17 NOTE — ANESTHESIA POSTPROCEDURE EVALUATION
Department of Anesthesiology  Postprocedure Note    Patient: Hemal Goldsmith  MRN: [id number]  YOB: 1970  Date of evaluation: 5/17/2019  Time:  4:16 PM     Procedure Summary     Date:  05/17/19 Room / Location:  [facility] 06 / [facility]    Anesthesia Start:  1304 Anesthesia Stop:  4996    Procedure:  LEFT PLANTAR FOOT INCISION AND DRAINAGE OF ABSCESS  WOUND GENTAMICIN GAUZE PACKING (PULSE VAC) (Left Foot) Diagnosis:  (PLANTAR LEFT FOOT)    Surgeon:  Valentino Kothari DPM Responsible Provider:  Stephen Irizarry MD    Anesthesia Type:  MAC ASA Status:  2          Anesthesia Type: MAC    Nida Phase I: Nida Score: 9    Nida Phase II:      Last vitals: Reviewed and per EMR flowsheets.        Anesthesia Post Evaluation    Patient location during evaluation: PACU  Patient participation: complete - patient participated  Level of consciousness: awake  Airway patency: patent  Nausea & Vomiting: no nausea and no vomiting  Complications: no  Cardiovascular status: hemodynamically stable  Respiratory status: acceptable  Hydration status: stable

## 2019-05-17 NOTE — PROGRESS NOTES
[place] [address] [place]   Progress Note    Admitting Date and Time: 5/15/2019  5:24 PM  Admit Dx: Abscess of left foot [L02.612]    Subjective:    Seen and examined  Sitting up in chair at bedside, visitor present  Patient is awaiting surgery this afternoon  Is not receptive to [facility] or SNF for wound care or long term IV antibiotics if recommended at discharge  States she has had nursing in the home in the past and has a very strong support system. Reports mild discomfort to left foot    ROS: denies fever, chills, cp, sob, n/v, HA unless stated above.  vancomycin  1,250 mg Intravenous Q8H    cefepime  2 g Intravenous T77P    folic acid  1 mg Oral Daily    gabapentin  300 mg Oral TID    sertraline  50 mg Oral Nightly    vitamin B-1  100 mg Oral Daily    sodium chloride flush  10 mL Intravenous 2 times per day    enoxaparin  40 mg Subcutaneous Daily       ketorolac 15 mg Q6H PRN   melatonin 3 mg Nightly PRN   sodium chloride flush 10 mL PRN   magnesium hydroxide 30 mL Daily PRN   ondansetron 4 mg Q6H PRN   acetaminophen 650 mg Q4H PRN        Objective:    BP (!) 155/81   Pulse 83   Temp 98.3 °F (36.8 °C) (Oral)   Resp 16   Ht 6' (1.829 m)   Wt 169 lb (76.7 kg)   SpO2 97%   BMI 22.92 kg/m²        General Appearance: alert and oriented to person, place and time and in no acute distress  Skin: warm and dry  Head: normocephalic and atraumatic  Eyes: pupils equal, round, and reactive to light, extraocular eye movements intact, conjunctivae normal  Neck: neck supple and non tender without mass   Pulmonary/Chest: clear to auscultation bilaterally- no wheezes, rales or rhonchi, normal air movement, no respiratory distress  Cardiovascular: normal rate, normal S1 and S2 and no carotid bruits  Abdomen: soft, non-tender, non-distended, normal bowel sounds, no masses or organomegaly  Extremities: no cyanosis, no clubbing and no edema.  Large abscess area to left plantar aspect foot, purulent drainage  Neurologic: no cranial nerve deficit and speech normal      Recent Labs     05/15/19  1753 05/16/19  0501 05/17/19  0432    139 139   K 4.0 4.0 4.2    106 106   CO2 23 21* 24   BUN 11 11 8   CREATININE 0.5 0.5 0.4*   GLUCOSE 136* 104* 104*   CALCIUM 8.9 8.4* 8.5*       Recent Labs     05/15/19  1753 05/16/19  0501 05/17/19  0432   WBC 13.5* 8.7 8.0   RBC 3.95 3.60 3.72   HGB 12.3 11.0* 11.4*   HCT 36.5 33.7* 34.5   MCV 92.4 93.6 92.7   MCH 31.1 30.6 30.6   MCHC 33.7 32.6 33.0   RDW 12.6 12.6 12.3    282 260   MPV 10.0 9.7 9.8           Radiology:   MRI FOOT LEFT WO CONTRAST   Final Result   1. Suggestion of soft tissue irregularity within the medial and   plantar soft tissues are overlying the great toe and 1st metatarsal.   No discrete collection is identified to suggest an abscess. 2. No confluent hypointense T1 marrow signal within the great toe or   1st metatarsal to suggest acute osteomyelitis. 3. Small 2nd MTP joint effusion with patchy edema within the 2nd   metatarsal head, possibly reactive, although early changes of   osteomyelitis may have a similar appearance. 4. Mild arthrosis at the 1st TMT joint. XR FOOT LEFT (MIN 3 VIEWS)   Final Result   1. There is no appreciable soft tissue foreign body and there are no   plain film findings of osteomyelitis. 2. Minimal soft tissue emphysema is seen along the lateral cortex of   the first metatarsal. The findings are suggestive of a soft tissue   abscess. XR TIBIA FIBULA LEFT (2 VIEWS)   Final Result   1. Unremarkable left tibia and fibula. Assessment:  Principal Problem:    Cellulitis and abscess of foot  Active Problems:    Leukocytosis    Risk for falls    Pain in left foot    Cellulitis of left foot    Abscess of foot    Neutrophilic leukocytosis  Resolved Problems:    * No resolved hospital problems. *      Plan:    1.  Cellulitis and abscess of left foot-  Failed outpatient treatment with Keflex/BactrimXray left foot with minimal soft tissue emphysema  along the lateral cortex of the first metatarsal.  Suggestive of a soft tissue abscess. Wound culture with GPC. Blood cultures no growth 24 hrs. Has been sen by ID. Now on cefepime and Vanc. Pharmacy to dose Vacomycin. Has been seen by podiatry, plans for I&D today 05/17 with possible wound vac placement. MRI negative for OM. 2. Leukocytosis-WBC 13.5, now resolved. Continue  Antibiotic therapy as above. Afebrile. 3. Risk for falls- Assistance with ambulation. Call light within reach. Hourly rounding. PT eval  4. Charcot foot: ATF braces.  PT eval    Medically cleared for surgical procedure today.      Electronically signed by MILLY Petty CNP on 5/17/2019 at 11:26 AM

## 2019-05-17 NOTE — BRIEF OP NOTE
Brief Postoperative Note  ______________________________________________________________    Patient: Loy Garber  YOB: 1970  MRN: [id number]  Date of Procedure: 5/17/2019    Pre-Op Diagnosis: PLANTAR LEFT FOOT, abscess of foot    Post-Op Diagnosis: Same       Procedure(s):  LEFT PLANTAR FOOT INCISION AND DRAINAGE OF ABSCESS  WOUND GENTAMICIN GAUZE PACKING (PULSE VAC)    Anesthesia: Monitor Anesthesia Care    Surgeon(s):  Macey Tate DPM    Assistant: Jb Patton PGY-1 DPM  Estimated Blood Loss (mL): less than 50     Complications: None    Specimens:   ID Type Source Tests Collected by Time Destination   1 : Culture left foot abcess Swab Foot ANAEROBIC CULTURE, FUNGUS CULTURE, GRAM STAIN, WOUND CULTURE, SURGICAL CULTURE, ACID FAST CULTURE WITH SMEAR Macey Tate DPM 5/17/2019 1321        Implants:  * No implants in log *      Drains: * No LDAs found *    Findings:  Debridement of nonviable tissue    Tim Otto  Date: 5/17/2019  Time: 1:47 PM

## 2019-05-17 NOTE — PROGRESS NOTES
Occupational Therapy  Occupational Therapy Initial Assessment      Date:2019  Patient Name: Fabio Lemus  MRN: [id number]  : 1970  Room: [address] [place] [address]    Evaluating OT: Rafa Vines, OTR/L [id number]    Recommendation for Placement: LTAC  Recommended Adaptive Equipment: TBD   AM-PAC Daily Activity Raw Score:     Diagnosis:   1. Cellulitis of left foot    2.  Abscess of foot      Pertinent Medical History:   Past Medical History:   Diagnosis Date    Anxiety     Depression     Foot drop, bilateral     Neutrophilic leukocytosis       Precautions:  Falls, impulsive, short term memory deficits, B foot drop with AFOs, NWB LLE (updated 3:39 PM)      Home Living: Pt lives alone in a 2 story home, resides on first floor, 2 steps to enter with rail +2 steps to enter without rail, Pt's son home for summer however is on a trip for the next 4 days, tub shower with shower chair  Equipment owned: wheeled walker, straight cane, reports she is able to borrow transport chair from family    Prior Level of Function: independent with ADLs , independent with IADLs; ambulated with wheeled walker or straight cane  Driving: no  Occupation: on disability    Pain Level: pt c/o 0/10 pain this session; nursing aware  Cognition: A&O: 4/4; Follows 1 step directions 50% of the time   Memory:  fair     Sequencing:  fair    Problem solving:  fair     Judgement/safety:  poor      Functional Assessment:   Initial Eval Status  Date: 19 Treatment Status  Date: Short Term Goals  Treatment frequency: PRN  1-3x/week   Feeding Independent   Independent    Grooming Minimal Assist for safety at sink level   Independent    UB Dressing Supervision   Independent    LB Dressing Moderate Assist for AFO's and safety when managing clothing over hips  Independent    Bathing Moderate Assist for safety and managing wound dressing  Modified [place]    Toileting Minimal Assist for safety when managing clothing over hips  Independent    Bed Mobility  Supine to sit: Supervision   Sit to supine: not assessed, pt up in chair at end of eval   Supine to sit: Independent   Sit to supine: Independent    Functional Transfers Minimal Assist for safety  Modified [place]    Functional Mobility Minimal Assist with wheeled walker for safety, approx 15 ft x 2 to bathroom, Pt impulsive  Modified [place] with device   Balance Sitting:     Static:  good    Dynamic:fair+  Standing: poor, NWB LLE     Activity Tolerance fair  good   Visual/  Perceptual Glasses: yes for driving, pt doesn't currently drive                UE Assessment:  Hand Dominance: Right [x]  Left []   Strength ROM Additional Info:    RUE  4/5  WFL good  and wfl FMC/dexterity noted during ADL tasks     LUE 4/5  WFL good  and wfl FMC/dexterity noted during ADL tasks       Hearing:  WFL   Sensation:   No c/o numbness or tingling   Tone:  WNL  Edema: none noted                            Comments/Treatment: OK from RN to see patient. Upon arrival, patient supine in bed. OT eval completed. Therapist facilitated: Bed mobility, sitting balance at EOB for 10 minutes to increase dynamic sitting balance and activity tolerance, transfer training with verbal cues for hand placement, static standing balance, functional mobility, verbal cues for walker sequence and safety, toilet transfer/toileting Min A for safety, pt sitting in chair at end of eval. All needs within reach. Pt would benefit from continued skilled OT to increase safety and independence with completion of ADL/IADL tasks for functional independence and quality of life.     Eval Complexity: Low    Assessment of current deficits  Functional mobility [x]  ADLs [x] Strength [x]  Cognition []  Functional transfers  [x] IADLs [x] Safety Awareness [x]  Endurance [x]  Fine Motor Coordination [] Balance [x] Vision/perception [] Sensation []   Gross Motor Coordination [] ROM [] Delirium []                  Motor Control []    Plan of Care: ADL retraining [x]   Equipment needs [x]   Neuromuscular re-education [x] Energy Conservation Techniques [x]  Functional Transfer training [x] Patient and/or Family Education [x]  Functional Mobility training [x]  Environmental Modifications [x]  Cognitive re-training []   Compensatory techniques for ADLs [x]  Splinting Needs []   Positioning to improve overall function [x]   Therapeutic Activity [x]  Therapeutic Exercise  [x]  Visual/Perceptual: []    Delirium prevention/treatment  []   Other:  []    Rehab Potential: Good for established goals     Patient / Family Goal: none stated     Patient and/or family were instructed diagnosis, prognosis/goals and plan of care. Demonstrated fair understanding.     Tx Time in: 1035  Tx Time out: 1050  Evaluation + 15 treatment time    Leonid Gonzales, OTR/L [id number]

## 2019-05-17 NOTE — PLAN OF CARE
Problem: Falls - Risk of:  Goal: Will remain free from falls  Description  Will remain free from falls  5/17/2019 0511 by Laine Smiley RN  Outcome: Met This Shift  5/16/2019 1634 by Taqueria Sheffield RN  Outcome: Met This Shift  Goal: Absence of physical injury  Description  Absence of physical injury  Outcome: Met This Shift     Problem: Pain:  Goal: Pain level will decrease  Description  Pain level will decrease  Outcome: Met This Shift  Goal: Control of acute pain  Description  Control of acute pain  5/17/2019 0511 by Laine Smiley RN  Outcome: Met This Shift  5/16/2019 1634 by Taqueria Sheffield RN  Outcome: Met This Shift  Goal: Control of chronic pain  Description  Control of chronic pain  Outcome: Met This Shift     Problem: Mobility - Impaired:  Goal: Mobility will improve  Description  Mobility will improve  5/17/2019 0511 by Laine Smiley RN  Outcome: Met This Shift  5/16/2019 1634 by Taqueria Sheffield RN  Outcome: Met This Shift     Problem:  Activity:  Goal: Ability to tolerate increased activity will improve  Description  Ability to tolerate increased activity will improve  5/17/2019 0511 by Laine Smiley RN  Outcome: Met This Shift  5/16/2019 1634 by Taqueria Sheffield RN  Outcome: Met This Shift     Problem: Skin Integrity:  Goal: Demonstration of wound healing without infection will improve  Description  Demonstration of wound healing without infection will improve  Outcome: Met This Shift  Goal: Complications related to intravenous access or infusion will be avoided or minimized  Description  Complications related to intravenous access or infusion will be avoided or minimized  5/17/2019 0511 by Laine Smiley RN  Outcome: Met This Shift  5/16/2019 1634 by Taqueria Sheffield RN  Outcome: Met This Shift     Problem: Nutrition  Goal: Optimal nutrition therapy  Outcome: Met This Shift

## 2019-05-17 NOTE — PROGRESS NOTES
Pharmacy Consultation Note  (Antibiotic Dosing and Monitoring)    Initial consult date: 5/15/19  Consulting physician: Harrison Tate CNP  Drug(s): Vancomycin  Indication: Cellulitis, abscess    Ht Readings from Last 1 Encounters:   19 6' (1.829 m)     Wt Readings from Last 1 Encounters:   05/15/19 169 lb (76.7 kg)         Age/  Gender IBW DW  Allergy Information   51 y.o.     female  76.7 kg  Patient has no known allergies. Date  WBC BUN/CR UOP Drug/Dose Time   Given Level(s)   (Time) Comments   5/15  (#1) 13.5 11/0.5 -- Vancomycin 1,500 mg IV x 1 1842       (#2) 8.7 11/0.5 0.6 mL/kg/hr Vancomycin 1,500 mg IV Q12H  Vancomycin 1,250 mg IV Q8H 0633  2111 5.8 mcg/mL @ 1841      (#3) 8.0 8/0.4 0.4 mL/kg/hr Vancomycin 1,250 mg IV Q8H 0501  1500  <2100> 17.7 mcg/mL @ 2058 Hold dose if trough is >20 mcg/mL     (#4)            (#5)            (#6)            (#7)            Estimated Creatinine Clearance: 196 mL/min (A) (based on SCr of 0.4 mg/dL (L)). UOP over the past 24 hours:       Intake/Output Summary (Last 24 hours) at 2019 1209  Last data filed at 2019 0456  Gross per 24 hour   Intake 730 ml   Output 750 ml   Net -20 ml       Temp max: Temp (24hrs), Av.5 °F (36.9 °C), Min:98.3 °F (36.8 °C), Max:98.7 °F (37.1 °C)      Antibiotic Regimen:  Antibiotic Dose Date Initiated   Cefazolin 1 g Q8H 5/15     Cultures:  available culture and sensitivity results were reviewed in EPIC  Culture Date Result    Wound (foot) 5/15 Gram positive cocci   Blood culture 5/15 NGTD     Assessment:  · Consulted by Harrison Tate CNP to dose/monitor vancomycin  · Goal trough level:  15-20 mcg/mL  · Pt is a 51 yof admitted with cellulitis and abscess of the foot, no history of diabetes.   · Serum creatinine today: 0.4; CrCl >100 mL/min; baseline Scr ~ 0.4 (), no recent labs  · : plan for I&D with wound vac placement, level was collected today @ 1841 prior to the third dose = 5.8 mcg/mL (not ordered by pharmacy)    Plan:  · Continue Vancomycin 1,250 mg IV Q8H  · Check trough today @ 2030, please draw trough 30 minutes prior to 2100 dose, hold dose only if trough is >20 mcg/mL  · Follow renal function  · Pharmacist will follow and monitor/adjust dosing as necessary      Thank you for the consult,    Vaishnavi Montiel PharmD, BCPS 5/17/2019 12:09 PM   [phone]    Addendum:    Trough @ 2058 = 17.7 mcg/mL, continue same regimen    Vaishnavi Montiel PharmD, BCPS 5/17/2019 10:25 PM   [phone]

## 2019-05-17 NOTE — OP NOTE
Procedure Note     Surgeon: Dr. Magda Iniguez,   DPM    Assistants: Odette Umanzor DPM PGY1    Pre-operative Diagnosis: Plantar left foot, abscess of foot    Post-operative Diagnosis: same    Anesthesia: Monitored Local Anesthesia with Sedation    Hemostasis: None    Findings: Debridement of non-viable tissue    Estimated Blood Loss:  less than 50 ml           Materials: None    Injectibles: 30cc of mix 1% lidocaine with 0.5% Marcaine           Specimens:   ID Type Source Tests Collected by Time Destination   1 : Culture left foot abcess Swab Foot ANAEROBIC CULTURE, FUNGUS CULTURE, GRAM STAIN, WOUND CULTURE, SURGICAL CULTURE, ACID FAST CULTURE WITH SMEAR                     Complications:  None; patient tolerated the procedure well. Condition: Stable    Procedure Details    Under mild sedation, the patient was brought to  the operating room, placed on the operating table in supine position. The left foot was scrubbed, prepped, and draped in the usual sterile fashion. At this time, 20cc was then injected into the left foot in a sterile fashion. Using a #10 blade, we performed a linear incision at the site of the left foot plantar medial midfoot proximally extending it. The incision was taken through the subcutaneous tissue. An additional 10cc of 0.5% marcaine was used. Next, deep dissection taken through the fascia, which at this point  noted nonviable tissue. Excisional debridement of noviable tissue down to red granular beefy tissue. Further exploration of tissue for any tracking using a metazenbaum scissor and one can appreciate the tracking proximally and lateral at the midfoot. Afterwards, irrigated the area using pulsed lavage system. Using gentamicin soaked 4x4s for packing the wound. Applied nonadhering dressing. Postoperative bandages were then applied.      The patient tolerated the procedure and anesthesia well in apparent satisfactory condition with vital signs stable and vascular status intact to the left lower extremity. The patient was transferred to the PACU for further monitoring prior to discharge. 1. notify medicine of return to floor  2. resume all pre-op medications, orders, and diet  3. Keep dressing CDI, podiatry to change daily  4.  NWB to left foot  5. elevate left foot with pillow under leg

## 2019-05-17 NOTE — PROGRESS NOTES
[facility]  PROGRESS NOTE  Admit Date:  5/15/2019   NAME:  Isaura Box  MRN:  [id number]  :  1970  Age:   51 y.o. PCP:   Ab Sanchez MD, [address] [facility] Day: Hospital Day: 3    Subjective:   Pt was seen and examined in a face to face encounter for FOOT INFECTION with CC of NONE  Chief Complaint   Patient presents with    Foot Injury     sent in from doctor for infection      HPI: AWAITING FOR SURGERY   LESS PAIN WITH LEFT LE UP  FAMILY PRESENT    ROS:  CONSTITUTIONAL:   No fever, chills, weight loss, loss of appetite  ALLERGIES:    No urticaria, hay fever,    EYES:     No blurry vision, loss of vision,eye pain  ENT:      No hearing loss, sore throat  CARDIOVASCULAR:  No chest pain, palpitations  RESPIRATORY:    No cough, sob  HEME-LYMPH:   No easy bruising, bleeding  GI:     No nausea, vomiting or diarrhea  :     No urinary complaints  NEURO:    No  lightheadedness, dizziness, confusion,   MUSCULOSKELETAL:   muscle aches, pain   SKIN:     No rash or itch  PSYCH:    No depression or anxiety      Scheduled Meds:   vancomycin  1,250 mg Intravenous Q8H    cefepime  2 g Intravenous Y52T    folic acid  1 mg Oral Daily    gabapentin  300 mg Oral TID    sertraline  50 mg Oral Nightly    vitamin B-1  100 mg Oral Daily    sodium chloride flush  10 mL Intravenous 2 times per day    enoxaparin  40 mg Subcutaneous Daily     Continuous Infusions:   sodium chloride 125 mL/hr at 19 0330     PRN Meds:ketorolac, melatonin, sodium chloride flush, magnesium hydroxide, ondansetron, acetaminophen    ALLERGIES: Patient has no known allergies. Objective:   Vitals reviewed.   Vitals:    19 0900 19 0932 19 2000 19 0720   BP: (!) 154/82  (!) 160/73 (!) 155/81   Pulse: 96  86 83   Resp: 18  18 16   Temp: 98 °F (36.7 °C)  98.7 °F (37.1 °C) 98.3 °F (36.8 °C)   TempSrc:   Oral Oral   SpO2: 98%  99% 97%   Weight:       Height:  6' (1.829 m)       Physical Exam  Constitutional: The patient is awake, alert, and oriented. Skin: Warm and dry. No rashes were noted. HEENT: Eyes show round, and reactive pupils. No jaundice. Neck: Supple to movements. No lymphadenopathy. Chest: No use of accessory muscles to breathe. Symmetrical expansion. Auscultation reveals no wheezing, crackles, or rhonchi. Cardiovascular: S1 and S2 are rhythmic and regular. No murmurs appreciated. Abdomen: Positive bowel sounds to auscultation. Benign to palpation. Extremities: No clubbing, no cyanosis, no edema. FOOT DRESSED NO ERYTHEMA  CNS: awake and alert  Psych:  pleasant  Lines: peripheral    I & O - 24hr:    Intake/Output Summary (Last 24 hours) at 5/17/2019 1107  Last data filed at 5/17/2019 0456  Gross per 24 hour   Intake 730 ml   Output 750 ml   Net -20 ml     No intake/output data recorded. Weight change:   LABS:    Recent Labs     05/15/19  1753 05/16/19  0501 05/17/19  0432   WBC 13.5* 8.7 8.0   HGB 12.3 11.0* 11.4*   HCT 36.5 33.7* 34.5   MCV 92.4 93.6 92.7    282 260     Recent Labs     05/15/19  1753 05/16/19  0501 05/17/19  0432    139 139   K 4.0 4.0 4.2    106 106   CO2 23 21* 24   BUN 11 11 8   CREATININE 0.5 0.5 0.4*   GFRAA >60 >60 >60   LABGLOM >60 >60 >60   GLUCOSE 136* 104* 104*   CALCIUM 8.9 8.4* 8.5*      No results for input(s): PROCAL in the last 72 hours.   Lab Results   Component Value Date    SEDRATE 63 (H) 05/15/2019     Lab Results   Component Value Date    CRP 9.1 (H) 05/15/2019       Recent Labs     05/16/19  1841   VANCOTROUGH 5.8       MICROBIOLOGY:   Recent Labs     05/15/19  1753   BC 24 Hours- no growth     Recent Labs     05/15/19  1753   BLOODCULT2 24 Hours- no growth     Lab Results   Component Value Date    BLOODCULT2 24 Hours- no growth 05/15/2019       Organism   Date Value Ref Range Status   05/15/2019 Gram-positive cocci (A)  Preliminary     WOUND/ABSCESS   Date Value Ref Range Status   05/15/2019 Preliminary    Heavy growth  Identification and sensitivity to follow         RADIOLOGY:  MRI FOOT LEFT WO CONTRAST   Final Result   1. Suggestion of soft tissue irregularity within the medial and   plantar soft tissues are overlying the great toe and 1st metatarsal.   No discrete collection is identified to suggest an abscess. 2. No confluent hypointense T1 marrow signal within the great toe or   1st metatarsal to suggest acute osteomyelitis. 3. Small 2nd MTP joint effusion with patchy edema within the 2nd   metatarsal head, possibly reactive, although early changes of   osteomyelitis may have a similar appearance. 4. Mild arthrosis at the 1st TMT joint. XR FOOT LEFT (MIN 3 VIEWS)   Final Result   1. There is no appreciable soft tissue foreign body and there are no   plain film findings of osteomyelitis. 2. Minimal soft tissue emphysema is seen along the lateral cortex of   the first metatarsal. The findings are suggestive of a soft tissue   abscess. XR TIBIA FIBULA LEFT (2 VIEWS)   Final Result   1. Unremarkable left tibia and fibula. Assessment/Plan:   51 y.o. female presented with   Chief Complaint   Patient presents with    Foot Injury     sent in from doctor for infection             Leukocytosis   Pain in left foot    Cellulitis of left foot    Abscess of foot    MRI 1. Suggestion of soft tissue irregularity within the medial and  plantar soft tissues are overlying the great toe and 1st metatarsal.  No discrete collection is identified to suggest an abscess. 2. No confluent hypointense T1 marrow signal within the great toe or  1st metatarsal to suggest acute osteomyelitis. 3. Small 2nd MTP joint effusion with patchy edema within the 2nd  metatarsal head, possibly reactive, although early changes of  osteomyelitis may have a similar appearance. 4. Mild arthrosis at the 1st TMT joint.      -prelim CX gpc  FOR OR TODAY   VANCO/CEFEPIME    Imaging and labs were reviewed per medical

## 2019-05-17 NOTE — PROGRESS NOTES
Room #:   4635/9053-26  Patient Name: Nj Lay    PHYSICAL THERAPY INITIAL EVALUATION    Tentative placement recommendation: LTAC  Vs HHPT with assistance  Equipment recommendation: To be determined      Plan of care: Patient will be seen   daily Monday-Friday,  for therapeutic exercise, functional retraining, endurance activities, balance exercises, family and patient education. AM-PAC Basic Mobility        AM-[facility] Mobility Inpatient   How much difficulty turning over in bed?: A Little  How much difficulty sitting down on / standing up from a chair with arms?: A Little  How much difficulty moving from lying on back to sitting on side of bed?: A Little  How much help from another person moving to and from a bed to a chair?: A Little  How much help from another person needed to walk in hospital room?: A Little  How much help from another person for climbing 3-5 steps with a railing?: A Lot  AM-PAC Inpatient Mobility Raw Score : 17  AM-PAC Inpatient T-Scale Score : 42.13  Mobility Inpatient CMS 0-100% Score: 50.57  Mobility Inpatient CMS G-Code Modifier : CK    Order:  EVALUATE AND TREAT    Diagnosis/Problem list:    1. Cellulitis of left foot    2. Abscess of foot        Patient Active Problem List   Diagnosis    Leukocytosis    Risk for falls    Cellulitis and abscess of foot    Pain in left foot    Cellulitis of left foot    Abscess of foot    Neutrophilic leukocytosis       Past medical history:       Diagnosis Date    Anxiety     Depression     Foot drop, bilateral     Neutrophilic leukocytosis    ;      Procedure Laterality Date    CYST REMOVAL      TUBAL LIGATION         The admitting diagnosis and active problem list as listed above have been reviewed prior to the initiation of this evaluation.   Last time out of bed: today    Precautions: falls , impulsive, wound L foot surgical shoe, short term memory deficits per charting, B foot drop AFO's   5/17 pm s/p LEFT PLANTAR FOOT INCISION AND DRAINAGE OF ABSCESS  WOUND GENTAMICIN GAUZE PACKING (PULSE VAC) NWB L  Social history: Patient lives alone  in a two story home resides first  with 2 steps with rail + 2 steps to enter without Rail  Tub shower  Son home for summer however going out of town x 4 days  Prior Level of Function: Patient ambulated independently    Equipment owned: Cane, Gina Murphy, Shower chair and Bilateral AFO's,       Mentation: alert, cooperative, oriented x 3  and follows directions,      ROM: wfl B foot drop , L foot wound dressing  STRENGTH: bue 4/5 right lower extremity 4/5 foot drop AFO, left lower extremity hip and knee 4/5 foot not assessed due to wound/dressing  PAIN: (measured on a visual analog scale with 0=no pain and 10=excruciating pain) 0/10. FUNCTIONAL ASSESSMENT   Bed Mobility- Supine to sit- Supervision           Scooting- Supervision        Sit to supine- Not assessed       Transfers-Sit to stand- Minimal assist cues for safety   Gait:  Patient ambulated 2 x 20 feet using wheeled walker with Minimal assist pt impulsive cues for safety, decreased speed, hand placement. NWB LLE   Steps:  Not assessed      Balance: sit-good         stand fair  ww    Edema: yes - Left lower extremity   Endurance: fair       Treatment:  Therapist educated and facilitated patient on techniques to increase safety and independence with bed mobility, balance, functional transfers, and functional mobility. Sat EOB x 5 minutes to increase dynamic sitting balance and activity tolerance. Patient ambulated to/from bathroom, assisted on/off commode. Stood at sink to [place], performed NWB Left lower extremity throughout. Patient demonstrating fair   understanding of education/techniques, requiring additional training/education. At end of session, patient in chair with   call light and phone within reach,   all lines and tubes intact, nursing notified.  Pt would benefit from continued skilled PT to increase functional independence and quality of life. Rehab Potential: good     Patients Goal: home does not want to go to facility    ASSESSMENT  Patient exhibits decreased strength, balance, coordination impairing functional mobility. GOALS to be met in 2 days. Bed mobility-  Independent        Transfers-Sit to stand-Modified Independent     Gait:  Patient to ambulate 50 feet using wheeled walker with Modified Independent     Steps: Patient to go up and down 4  step(s) using 1 rail(s) with  Supervision        Increase rom in affected joints by 10%  Increase strength in affected mm groups by 1/3 grade  Increase balance to allow for improvement towards functional goals. Increase endurance to allow for improvement towards functional goals.         Carlton Kilgore, PT

## 2019-05-18 LAB
ANION GAP SERPL CALCULATED.3IONS-SCNC: 9 MMOL/L (ref 7–16)
BASOPHILS ABSOLUTE: 0.08 E9/L (ref 0–0.2)
BASOPHILS RELATIVE PERCENT: 0.9 % (ref 0–2)
BUN BLDV-MCNC: 10 MG/DL (ref 6–20)
CALCIUM SERPL-MCNC: 8.9 MG/DL (ref 8.6–10.2)
CHLORIDE BLD-SCNC: 102 MMOL/L (ref 98–107)
CO2: 27 MMOL/L (ref 22–29)
CREAT SERPL-MCNC: 0.5 MG/DL (ref 0.5–1)
EOSINOPHILS ABSOLUTE: 0.38 E9/L (ref 0.05–0.5)
EOSINOPHILS RELATIVE PERCENT: 4.3 % (ref 0–6)
GFR AFRICAN AMERICAN: >60
GFR NON-AFRICAN AMERICAN: >60 ML/MIN/1.73
GLUCOSE BLD-MCNC: 100 MG/DL (ref 74–99)
GRAM STAIN ORDERABLE: NORMAL
HCT VFR BLD CALC: 33.6 % (ref 34–48)
HEMOGLOBIN: 11.2 G/DL (ref 11.5–15.5)
IMMATURE GRANULOCYTES #: 0.1 E9/L
IMMATURE GRANULOCYTES %: 1.1 % (ref 0–5)
LYMPHOCYTES ABSOLUTE: 1.98 E9/L (ref 1.5–4)
LYMPHOCYTES RELATIVE PERCENT: 22.4 % (ref 20–42)
MCH RBC QN AUTO: 31 PG (ref 26–35)
MCHC RBC AUTO-ENTMCNC: 33.3 % (ref 32–34.5)
MCV RBC AUTO: 93.1 FL (ref 80–99.9)
MONOCYTES ABSOLUTE: 0.95 E9/L (ref 0.1–0.95)
MONOCYTES RELATIVE PERCENT: 10.8 % (ref 2–12)
NEUTROPHILS ABSOLUTE: 5.34 E9/L (ref 1.8–7.3)
NEUTROPHILS RELATIVE PERCENT: 60.5 % (ref 43–80)
ORGANISM: ABNORMAL
PDW BLD-RTO: 12.2 FL (ref 11.5–15)
PLATELET # BLD: 263 E9/L (ref 130–450)
PMV BLD AUTO: 9.4 FL (ref 7–12)
POTASSIUM SERPL-SCNC: 3.9 MMOL/L (ref 3.5–5)
RBC # BLD: 3.61 E12/L (ref 3.5–5.5)
SODIUM BLD-SCNC: 138 MMOL/L (ref 132–146)
WBC # BLD: 8.8 E9/L (ref 4.5–11.5)
WOUND/ABSCESS: ABNORMAL
WOUND/ABSCESS: ABNORMAL

## 2019-05-18 PROCEDURE — 6370000000 HC RX 637 (ALT 250 FOR IP): Performed by: STUDENT IN AN ORGANIZED HEALTH CARE EDUCATION/TRAINING PROGRAM

## 2019-05-18 PROCEDURE — 1200000000 HC SEMI PRIVATE

## 2019-05-18 PROCEDURE — 2580000003 HC RX 258: Performed by: STUDENT IN AN ORGANIZED HEALTH CARE EDUCATION/TRAINING PROGRAM

## 2019-05-18 PROCEDURE — 80048 BASIC METABOLIC PNL TOTAL CA: CPT

## 2019-05-18 PROCEDURE — 6360000002 HC RX W HCPCS: Performed by: PODIATRIST

## 2019-05-18 PROCEDURE — 94150 VITAL CAPACITY TEST: CPT

## 2019-05-18 PROCEDURE — 85025 COMPLETE CBC W/AUTO DIFF WBC: CPT

## 2019-05-18 PROCEDURE — 6360000002 HC RX W HCPCS: Performed by: STUDENT IN AN ORGANIZED HEALTH CARE EDUCATION/TRAINING PROGRAM

## 2019-05-18 PROCEDURE — 99233 SBSQ HOSP IP/OBS HIGH 50: CPT | Performed by: INTERNAL MEDICINE

## 2019-05-18 PROCEDURE — 36415 COLL VENOUS BLD VENIPUNCTURE: CPT

## 2019-05-18 RX ORDER — MORPHINE SULFATE 4 MG/ML
4 INJECTION, SOLUTION INTRAMUSCULAR; INTRAVENOUS ONCE
Status: COMPLETED | OUTPATIENT
Start: 2019-05-18 | End: 2019-05-18

## 2019-05-18 RX ADMIN — CEFEPIME 2 G: 2 INJECTION, POWDER, FOR SOLUTION INTRAVENOUS at 20:27

## 2019-05-18 RX ADMIN — Medication 10 ML: at 09:48

## 2019-05-18 RX ADMIN — OXYCODONE HYDROCHLORIDE AND ACETAMINOPHEN 2 TABLET: 5; 325 TABLET ORAL at 06:01

## 2019-05-18 RX ADMIN — GABAPENTIN 300 MG: 300 CAPSULE ORAL at 20:28

## 2019-05-18 RX ADMIN — Medication 10 ML: at 08:54

## 2019-05-18 RX ADMIN — THIAMINE HCL TAB 100 MG 100 MG: 100 TAB at 08:57

## 2019-05-18 RX ADMIN — FOLIC ACID 1 MG: 1 TABLET ORAL at 08:54

## 2019-05-18 RX ADMIN — GABAPENTIN 300 MG: 300 CAPSULE ORAL at 08:54

## 2019-05-18 RX ADMIN — SERTRALINE HYDROCHLORIDE 50 MG: 50 TABLET ORAL at 20:27

## 2019-05-18 RX ADMIN — OXYCODONE HYDROCHLORIDE AND ACETAMINOPHEN 2 TABLET: 5; 325 TABLET ORAL at 14:59

## 2019-05-18 RX ADMIN — VANCOMYCIN HYDROCHLORIDE 1250 MG: 10 INJECTION, POWDER, LYOPHILIZED, FOR SOLUTION INTRAVENOUS at 18:21

## 2019-05-18 RX ADMIN — VANCOMYCIN HYDROCHLORIDE 1250 MG: 10 INJECTION, POWDER, LYOPHILIZED, FOR SOLUTION INTRAVENOUS at 11:08

## 2019-05-18 RX ADMIN — MORPHINE SULFATE 4 MG: 4 INJECTION INTRAVENOUS at 09:48

## 2019-05-18 RX ADMIN — GABAPENTIN 300 MG: 300 CAPSULE ORAL at 13:41

## 2019-05-18 RX ADMIN — GENTAMICIN SULFATE 80 MG: 80 INJECTION, SOLUTION INTRAVENOUS at 09:00

## 2019-05-18 RX ADMIN — SODIUM CHLORIDE: 9 INJECTION, SOLUTION INTRAVENOUS at 13:39

## 2019-05-18 RX ADMIN — CEFEPIME 2 G: 2 INJECTION, POWDER, FOR SOLUTION INTRAVENOUS at 05:53

## 2019-05-18 RX ADMIN — OXYCODONE HYDROCHLORIDE AND ACETAMINOPHEN 2 TABLET: 5; 325 TABLET ORAL at 22:01

## 2019-05-18 RX ADMIN — ENOXAPARIN SODIUM 40 MG: 40 INJECTION SUBCUTANEOUS at 08:54

## 2019-05-18 ASSESSMENT — PAIN SCALES - GENERAL
PAINLEVEL_OUTOF10: 8
PAINLEVEL_OUTOF10: 8
PAINLEVEL_OUTOF10: 4
PAINLEVEL_OUTOF10: 10
PAINLEVEL_OUTOF10: 6
PAINLEVEL_OUTOF10: 0
PAINLEVEL_OUTOF10: 8

## 2019-05-18 ASSESSMENT — PAIN DESCRIPTION - LOCATION
LOCATION: FOOT
LOCATION: FOOT

## 2019-05-18 ASSESSMENT — PAIN DESCRIPTION - ONSET
ONSET: ON-GOING
ONSET: ON-GOING

## 2019-05-18 ASSESSMENT — PAIN DESCRIPTION - PROGRESSION: CLINICAL_PROGRESSION: GRADUALLY WORSENING

## 2019-05-18 ASSESSMENT — PAIN DESCRIPTION - FREQUENCY
FREQUENCY: CONTINUOUS
FREQUENCY: CONTINUOUS

## 2019-05-18 ASSESSMENT — PAIN DESCRIPTION - PAIN TYPE
TYPE: SURGICAL PAIN
TYPE: SURGICAL PAIN

## 2019-05-18 ASSESSMENT — PAIN DESCRIPTION - ORIENTATION
ORIENTATION: LEFT
ORIENTATION: LEFT

## 2019-05-18 ASSESSMENT — PAIN DESCRIPTION - DESCRIPTORS
DESCRIPTORS: ACHING;CONSTANT;DISCOMFORT;SORE
DESCRIPTORS: ACHING;CONSTANT;JABBING

## 2019-05-18 NOTE — PROGRESS NOTES
[address] [place], [place] [address]ist   Progress Note    Admitting Date and Time: 5/15/2019  5:24 PM  Admit Dx: Abscess of left foot [L02.612]    Subjective: Very minimal discomfort left foot. Appetite improving      ROS: denies fever, chills, cp, sob, n/v, HA unless stated above.  sodium chloride flush  10 mL Intravenous 2 times per day    irrigation builder  80 mg Irrigation Daily    vancomycin  1,250 mg Intravenous Q8H    cefepime  2 g Intravenous L74Y    folic acid  1 mg Oral Daily    gabapentin  300 mg Oral TID    sertraline  50 mg Oral Nightly    vitamin B-1  100 mg Oral Daily    enoxaparin  40 mg Subcutaneous Daily       sodium chloride flush 10 mL PRN   oxyCODONE-acetaminophen 1 tablet Q4H PRN   Or     oxyCODONE-acetaminophen 2 tablet Q4H PRN   fentanNYL 50 mcg Q5 Min PRN   melatonin 3 mg Nightly PRN   magnesium hydroxide 30 mL Daily PRN   ondansetron 4 mg Q6H PRN   acetaminophen 650 mg Q4H PRN        Objective:    BP (!) 154/72   Pulse 87   Temp 98.1 °F (36.7 °C) (Oral)   Resp 16   Ht 6' (1.829 m)   Wt 169 lb (76.7 kg)   SpO2 97%   BMI 22.92 kg/m²   General Appearance: alert and oriented to person, place and time and in no acute distress. Mother brother and daughter-in-law are present  Skin: warm and dry  Head: normocephalic and atraumatic  Eyes: pupils equal, round, and reactive to light, extraocular eye movements intact, conjunctivae normal  Neck: neck supple and non tender without mass   Pulmonary/Chest: clear to auscultation bilaterally- no wheezes, rales or rhonchi, normal air movement, no respiratory distress  Cardiovascular: normal rate, normal S1 and S2 and no carotid bruits  Abdomen: soft, non-tender, non-distended, normal bowel sounds, no masses or organomegaly  Extremities: no cyanosis, no clubbing and no edema.  Extensive bandages in his left foot  Neurologic: no cranial nerve deficit and speech normal      Recent Labs     05/16/19  0501 05/17/19  0432 05/18/19  0506   NA 139 139 138   K 4.0 4.2 3.9    106 102   CO2 21* 24 27   BUN 11 8 10   CREATININE 0.5 0.4* 0.5   GLUCOSE 104* 104* 100*   CALCIUM 8.4* 8.5* 8.9       Recent Labs     05/16/19  0501 05/17/19  0432 05/18/19  0506   WBC 8.7 8.0 8.8   RBC 3.60 3.72 3.61   HGB 11.0* 11.4* 11.2*   HCT 33.7* 34.5 33.6*   MCV 93.6 92.7 93.1   MCH 30.6 30.6 31.0   MCHC 32.6 33.0 33.3   RDW 12.6 12.3 12.2    260 263   MPV 9.7 9.8 9.4         Radiology:   MRI FOOT LEFT WO CONTRAST   Final Result   1. Suggestion of soft tissue irregularity within the medial and   plantar soft tissues are overlying the great toe and 1st metatarsal.   No discrete collection is identified to suggest an abscess. 2. No confluent hypointense T1 marrow signal within the great toe or   1st metatarsal to suggest acute osteomyelitis. 3. Small 2nd MTP joint effusion with patchy edema within the 2nd   metatarsal head, possibly reactive, although early changes of   osteomyelitis may have a similar appearance. 4. Mild arthrosis at the 1st TMT joint. XR FOOT LEFT (MIN 3 VIEWS)   Final Result   1. There is no appreciable soft tissue foreign body and there are no   plain film findings of osteomyelitis. 2. Minimal soft tissue emphysema is seen along the lateral cortex of   the first metatarsal. The findings are suggestive of a soft tissue   abscess. XR TIBIA FIBULA LEFT (2 VIEWS)   Final Result   1. Unremarkable left tibia and fibula. Consultation of infectious disease specialist Dr. Anton Church reviewed in detail    Tissue culture shows enterococcus faecalis sensitive to the usual antibiotics    Assessment:  Principal Problem:    Cellulitis and abscess of foot  Active Problems:    Leukocytosis    Risk for falls    Pain in left foot    Cellulitis of left foot    Abscess of foot    Neutrophilic leukocytosis  Resolved Problems:    * No resolved hospital problems. *     PLAN:  1.  Enterococcus faecalis cellulitis and abscess of left foot--patient failed outpatient treatment with Bactrim and Keflex however she did irritate the area with some caustic chemicals to clean the wound.  Patient is currently on vancomycin and cefepime(in place of Ancef) per ID. Underwent I&D yesterday. With results of culture ID may or may not wish to discontinue the current antibiotics and go with ampicillin or amoxicillin. However this would be TOTALLY up to their sole discretion. 2. Leukocytosis secondary to above--monitor white count and cultures. 3. Disposition--pending surgical culture results. Patient preference is for home but may be unable to do that if needs IV antibiotics(could be cost prohibitive). 4. Underweight-clinical impression-this has been stable     Case discussed in extensive detail the patient's brother Michael Latif and also with the mother at the patient's request.       NOTE: This report was transcribed using voice recognition software.  Every effort was made to ensure accuracy; however, inadvertent computerized transcription errors may be present.     Electronically signed by Yemi Lees DO on 5/18/2019 at 1:30 PM

## 2019-05-18 NOTE — PROGRESS NOTES
05/18/2019       Scheduled Meds:   sodium chloride flush  10 mL Intravenous 2 times per day    irrigation builder  80 mg Irrigation Daily    vancomycin  1,250 mg Intravenous Q8H    cefepime  2 g Intravenous F24P    folic acid  1 mg Oral Daily    gabapentin  300 mg Oral TID    sertraline  50 mg Oral Nightly    vitamin B-1  100 mg Oral Daily    enoxaparin  40 mg Subcutaneous Daily     Continuous Infusions:   sodium chloride 125 mL/hr at 05/16/19 0330     PRN Meds:.sodium chloride flush, oxyCODONE-acetaminophen **OR** oxyCODONE-acetaminophen, fentanNYL, melatonin, magnesium hydroxide, ondansetron, acetaminophen    Diagnostics  XR FOOT LEFT (MIN 3 VIEWS)   Status: Final result   Order Providers      Authorizing Billing   Margarita Mccallum, [id number] MD           Signed by      Signed Date/Time   Phone Pager   Verlinda Gilford 5/15/2019 18:36 [phone]     [facility]      Read Date Phone Pager   Verlinda Gilford May 15, 2019 [phone]     Radiation Dose Estimates      No radiation information found for this patient   Narrative   Reading location:  200       HISTORY: Wound along the plantar aspect of the left foot.       TECHNIQUE: 3 views of the left foot were obtained.        FINDINGS: There is no fracture or dislocation of the left foot. Prominent soft tissue is seen along the plantar aspect of the midfoot.       On the frontal view of the foot, there is subcutaneous emphysema seen   along the lateral cortex of the first metatarsal. There is no   periosteal reaction to suggest osteomyelitis.           Impression   1. There is no appreciable soft tissue foreign body and there are no   plain film findings of osteomyelitis.    2. Minimal soft tissue emphysema is seen along the lateral cortex of   the first metatarsal. The findings are suggestive of a soft tissue   abscess.             MRI FOOT LEFT WO CONTRAST   Status: Final result   Order Providers      Authorizing Billing   Tim Cruz Baylee Izquierdo MD           Signed by      Signed Date/Time   Phone Pager   Keeganarlen Nury 5/16/2019 09:05 [phone]     [facility]      Read Date Phone Pager   Sosa Arrington May 16, 2019 [phone]     Orders Requiring a Screening Form      Procedure Order Status Form Status    MRI FOOT LEFT WO CONTRAST Completed Created   Radiation Dose Estimates      No radiation information found for this patient   Narrative   Reading location:  200       Indication: Left foot ulcers, evaluate for osteomyelitis.       Comparison: Left foot radiograph from 5/15/2019.       Technique: Multiplanar, multisequence MR imaging of the left forefoot   was performed without administration of intravenous contrast.       FINDINGS:       There is suggestion of multifocal areas of soft tissue irregularity   within the medial soft tissues overlying the interphalangeal joint of   the great toe, medial soft tissues overlying the mid shaft of the 1st   metatarsal, and within the plantar soft tissues at the level of the   1st metatarsophalangeal joint. There is no discrete loculated   collection to suggest an abscess. There is no confluent hypointense T1   marrow signal within the great toe or 1st metatarsal to suggest acute   osteomyelitis.       There is a small effusion within the 2nd metatarsophalangeal joint   with a patchy area of edema within the 2nd metatarsal head.       There is no evidence of acute fracture. There is mild arthrosis at the   1st tarsometatarsal joint with subchondral reactive change within the   distal and dorsal aspect of the medial cuneiform.       There is a trace effusion within the 1st metatarsophalangeal joint.       The interosseous band of the Lisfranc ligament is grossly intact. There is diffuse increased signal and atrophy of the muscles of the   forefoot, likely related to denervation. The visualized tendons are   grossly intact.           Impression   1.  Suggestion of soft tissue irregularity within

## 2019-05-18 NOTE — PLAN OF CARE
Pt will remain free from falls and further injury of left foot. Pain will be controlled with ordered medication and given as needed.

## 2019-05-18 NOTE — PROGRESS NOTES
[facility]  PROGRESS NOTE  Admit Date:  5/15/2019   NAME:  Leonardo Brown  MRN:  [id number]  :  1970  Age:   51 y.o. PCP:   Mortimer Smoke, MD, [address] [facility] Day: Hospital Day: 4    Subjective:   Pt was seen and examined in a face to face encounter for FOOT INFECTION with CC of NONE  Chief Complaint   Patient presents with    Foot Injury     sent in from doctor for infection      HPI: s.p  SURGERY    PAIN WITH LEFT le s/p dressing change   No FAMILY PRESENT    ROS:  CONSTITUTIONAL:   No fever, chills, weight loss, loss of appetite  ALLERGIES:    No urticaria, hay fever,    EYES:     No blurry vision, loss of vision,eye pain  ENT:      No hearing loss, sore throat  CARDIOVASCULAR:  No chest pain, palpitations  RESPIRATORY:    No cough, sob  HEME-LYMPH:   No easy bruising, bleeding  GI:     No nausea, vomiting or diarrhea  :     No urinary complaints  NEURO:    No  lightheadedness, dizziness, confusion,   MUSCULOSKELETAL:   muscle aches, pain   SKIN:     No rash or itch  PSYCH:    No depression or anxiety      Scheduled Meds:   sodium chloride flush  10 mL Intravenous 2 times per day    irrigation builder  80 mg Irrigation Daily    vancomycin  1,250 mg Intravenous Q8H    cefepime  2 g Intravenous A17S    folic acid  1 mg Oral Daily    gabapentin  300 mg Oral TID    sertraline  50 mg Oral Nightly    vitamin B-1  100 mg Oral Daily    enoxaparin  40 mg Subcutaneous Daily     Continuous Infusions:   sodium chloride 125 mL/hr at 19 0330     PRN Meds:sodium chloride flush, oxyCODONE-acetaminophen **OR** oxyCODONE-acetaminophen, fentanNYL, melatonin, magnesium hydroxide, ondansetron, acetaminophen    ALLERGIES: Patient has no known allergies. Objective:   Vitals reviewed.   Vitals:    19 1445 19 1543 19 1922 19 0003   BP: (!) 169/76 (!) 147/64 (!) 153/81 (!) 154/72   Pulse: 82 79 86 87   Resp:  16 16   Temp: 98 °F (36.7 °C) 97.8 °F (36.6 °C) 97.8 °F (36.6 °C) 98.1 °F (36.7 °C)   TempSrc: Oral Oral Oral Oral   SpO2: 98%  97%    Weight:       Height:         Physical Exam  Constitutional: The patient is awake, alert, and oriented. Skin: Warm and dry. No rashes were noted. HEENT: Eyes show round, and reactive pupils. Neck: Supple to movements. Chest: No use of accessory muscles to breathe. Cardiovascular: S1 and S2 are rhythmic and regular. Abdomen: Positive bowel sounds to auscultation. Benign to palpation. Extremities: No clubbing, no cyanosis, no edema. FOOT DRESSED NO ERYTHEMA  CNS: awake and alert  Psych:  pleasant  Lines: peripheral    I & O - 24hr:    Intake/Output Summary (Last 24 hours) at 5/18/2019 1021  Last data filed at 5/18/2019 0609  Gross per 24 hour   Intake 865 ml   Output 1650 ml   Net -785 ml     No intake/output data recorded. Weight change:   LABS:    Recent Labs     05/16/19  0501 05/17/19  0432 05/18/19  0506   WBC 8.7 8.0 8.8   HGB 11.0* 11.4* 11.2*   HCT 33.7* 34.5 33.6*   MCV 93.6 92.7 93.1    260 263     Recent Labs     05/16/19  0501 05/17/19  0432 05/18/19  0506    139 138   K 4.0 4.2 3.9    106 102   CO2 21* 24 27   BUN 11 8 10   CREATININE 0.5 0.4* 0.5   GFRAA >60 >60 >60   LABGLOM >60 >60 >60   GLUCOSE 104* 104* 100*   CALCIUM 8.4* 8.5* 8.9      No results for input(s): PROCAL in the last 72 hours.   Lab Results   Component Value Date    SEDRATE 63 (H) 05/15/2019     Lab Results   Component Value Date    CRP 9.1 (H) 05/15/2019       Recent Labs     05/16/19  1841 05/17/19  2058   VANCOTROUGH 5.8 17.7*       MICROBIOLOGY:   Recent Labs     05/15/19  1753   BC 24 Hours- no growth     Recent Labs     05/15/19  1753   BLOODCULT2 24 Hours- no growth     Lab Results   Component Value Date    BLOODCULT2 24 Hours- no growth 05/15/2019       Organism   Date Value Ref Range Status   05/15/2019 Enterococcus faecalis (A)  Final     WOUND/ABSCESS   Date Value Ref Range Status   05/15/2019 Heavy growth  Final       RADIOLOGY:  MRI FOOT LEFT WO CONTRAST   Final Result   1. Suggestion of soft tissue irregularity within the medial and   plantar soft tissues are overlying the great toe and 1st metatarsal.   No discrete collection is identified to suggest an abscess. 2. No confluent hypointense T1 marrow signal within the great toe or   1st metatarsal to suggest acute osteomyelitis. 3. Small 2nd MTP joint effusion with patchy edema within the 2nd   metatarsal head, possibly reactive, although early changes of   osteomyelitis may have a similar appearance. 4. Mild arthrosis at the 1st TMT joint. XR FOOT LEFT (MIN 3 VIEWS)   Final Result   1. There is no appreciable soft tissue foreign body and there are no   plain film findings of osteomyelitis. 2. Minimal soft tissue emphysema is seen along the lateral cortex of   the first metatarsal. The findings are suggestive of a soft tissue   abscess. XR TIBIA FIBULA LEFT (2 VIEWS)   Final Result   1. Unremarkable left tibia and fibula. Assessment/Plan:   51 y.o. female presented with   Chief Complaint   Patient presents with    Foot Injury     sent in from doctor for infection             Leukocytosis   Pain in left foot    Cellulitis of left foot    Abscess of foot    MRI 1. Suggestion of soft tissue irregularity within the medial and  plantar soft tissues are overlying the great toe and 1st metatarsal.  No discrete collection is identified to suggest an abscess. 2. No confluent hypointense T1 marrow signal within the great toe or  1st metatarsal to suggest acute osteomyelitis. 3. Small 2nd MTP joint effusion with patchy edema within the 2nd  metatarsal head, possibly reactive, although early changes of  osteomyelitis may have a similar appearance. 4. Mild arthrosis at the 1st TMT joint.      -prelim CX gpc Enterococcus faecalis  S/p LEFT PLANTAR FOOT INCISION AND DRAINAGE OF ABSCESS  WOUND GENTAMICIN GAUZE PACKING (PULSE VAC)  VANCO/CEFEPIME  Will narrow atbx    Imaging and labs were reviewed per medical records and any ID pertinent labs were addressed with the patient. The patient was educated about the diagnosis, prognosis, indications, risks and benefits of treatment. The patient is in agreement with the proposed medical treatment plan. An opportunity to ask questions was given to the patient and questions were answered.             Electronically signed by Lori Waddell MD on 5/18/2019 at 10:21 AM

## 2019-05-18 NOTE — PROGRESS NOTES
Pharmacy Consultation Note  (Antibiotic Dosing and Monitoring)    Initial consult date: 5/15/19  Consulting physician: Brandon Stock CNP  Drug(s): Vancomycin  Indication: Cellulitis, abscess    Ht Readings from Last 1 Encounters:   19 6' (1.829 m)     Wt Readings from Last 1 Encounters:   05/15/19 169 lb (76.7 kg)         Age/  Gender IBW DW  Allergy Information   51 y.o.     female  76.7 kg  Patient has no known allergies. Date  WBC BUN/CR UOP Drug/Dose Time   Given Level(s)   (Time) Comments   5/15  (#1) 13.5 11/0.5 -- Vancomycin 1,500 mg IV x 1 1842       (#2) 8.7 11/0.5 0.6 mL/kg/hr Vancomycin 1,500 mg IV Q12H  Vancomycin 1,250 mg IV Q8H 0633  2111 5.8 mcg/mL @ 1841      (#3) 8.0 8/0.4 0.4 mL/kg/hr Vancomycin 1,250 mg IV Q8H 0501  1500  2251 17.7 mcg/mL @ 2058 Hold dose if trough is >20 mcg/mL     (#4) 8.8 10/0.5 0.9 mL/kg/hr Vancomycin 1,250 mg IV Q8H 1108       (#5)            (#6)            (#7)            Estimated Creatinine Clearance: 157 mL/min (based on SCr of 0.5 mg/dL). UOP over the past 24 hours:       Intake/Output Summary (Last 24 hours) at 2019 1207  Last data filed at 2019 7589  Gross per 24 hour   Intake 865 ml   Output 1650 ml   Net -785 ml       Temp max: Temp (24hrs), Av °F (36.7 °C), Min:97.8 °F (36.6 °C), Max:98.4 °F (36.9 °C)      Antibiotic Regimen:  Antibiotic Dose Date Initiated Date Stopped   Cefazolin 1 g Q8H 5/15 5/16   Cefepime 2 g Q12H       Cultures:  available culture and sensitivity results were reviewed in EPIC  Culture Date Result    Wound (foot) 5/15 E faecalis   Blood culture 5/15 NGTD   Surgical 5/17 Few PMN's  No epi cells  Abundant GPC  Few GNR  Rare GPR     Assessment:  · Consulted by Brandon Stock CNP to dose/monitor vancomycin  · Goal trough level:  15-20 mcg/mL  · Pt is a 51 yof admitted with cellulitis and abscess of the foot, no history of diabetes.   · Serum creatinine today: 0.5; CrCl >100 mL/min; baseline Scr ~ 0.4 (2015), no recent labs  · 5/16: plan for I&D with wound vac placement, level was collected today @ 1841 prior to the third dose = 5.8 mcg/mL (not ordered by pharmacy)  · 5/17: trough last night @ 2058 = 17.7 mcg/mL; s/p I&D abscess with gentamicin packing    Plan:  · Continue Vancomycin 1,250 mg IV Q8H  · Follow renal function  · Pharmacist will follow and monitor/adjust dosing as necessary      Thank you for the consult,    Sherrell Spicer, PharmD, BCPS 5/18/2019 12:07 PM   [phone]

## 2019-05-19 LAB
ANAEROBIC CULTURE: NORMAL
ANION GAP SERPL CALCULATED.3IONS-SCNC: 8 MMOL/L (ref 7–16)
BUN BLDV-MCNC: 7 MG/DL (ref 6–20)
CALCIUM SERPL-MCNC: 8.8 MG/DL (ref 8.6–10.2)
CHLORIDE BLD-SCNC: 104 MMOL/L (ref 98–107)
CO2: 26 MMOL/L (ref 22–29)
CREAT SERPL-MCNC: 0.5 MG/DL (ref 0.5–1)
CULTURE SURGICAL: ABNORMAL
CULTURE SURGICAL: ABNORMAL
GFR AFRICAN AMERICAN: >60
GFR NON-AFRICAN AMERICAN: >60 ML/MIN/1.73
GLUCOSE BLD-MCNC: 94 MG/DL (ref 74–99)
GRAM STAIN RESULT: ABNORMAL
ORGANISM: ABNORMAL
POTASSIUM SERPL-SCNC: 4 MMOL/L (ref 3.5–5)
SODIUM BLD-SCNC: 138 MMOL/L (ref 132–146)

## 2019-05-19 PROCEDURE — 6370000000 HC RX 637 (ALT 250 FOR IP): Performed by: STUDENT IN AN ORGANIZED HEALTH CARE EDUCATION/TRAINING PROGRAM

## 2019-05-19 PROCEDURE — 99233 SBSQ HOSP IP/OBS HIGH 50: CPT | Performed by: INTERNAL MEDICINE

## 2019-05-19 PROCEDURE — 1200000000 HC SEMI PRIVATE

## 2019-05-19 PROCEDURE — 97116 GAIT TRAINING THERAPY: CPT

## 2019-05-19 PROCEDURE — 6360000002 HC RX W HCPCS: Performed by: STUDENT IN AN ORGANIZED HEALTH CARE EDUCATION/TRAINING PROGRAM

## 2019-05-19 PROCEDURE — 6360000002 HC RX W HCPCS: Performed by: PODIATRIST

## 2019-05-19 PROCEDURE — 6360000002 HC RX W HCPCS: Performed by: SPECIALIST

## 2019-05-19 PROCEDURE — 2580000003 HC RX 258: Performed by: STUDENT IN AN ORGANIZED HEALTH CARE EDUCATION/TRAINING PROGRAM

## 2019-05-19 PROCEDURE — 80048 BASIC METABOLIC PNL TOTAL CA: CPT

## 2019-05-19 PROCEDURE — 6370000000 HC RX 637 (ALT 250 FOR IP): Performed by: INTERNAL MEDICINE

## 2019-05-19 PROCEDURE — 2580000003 HC RX 258: Performed by: SPECIALIST

## 2019-05-19 PROCEDURE — 36415 COLL VENOUS BLD VENIPUNCTURE: CPT

## 2019-05-19 RX ORDER — DOCUSATE SODIUM 100 MG/1
100 CAPSULE, LIQUID FILLED ORAL 2 TIMES DAILY
Status: DISCONTINUED | OUTPATIENT
Start: 2019-05-19 | End: 2019-05-21 | Stop reason: HOSPADM

## 2019-05-19 RX ADMIN — OXYCODONE HYDROCHLORIDE AND ACETAMINOPHEN 2 TABLET: 5; 325 TABLET ORAL at 10:41

## 2019-05-19 RX ADMIN — BISACODYL 5 MG: 5 TABLET, COATED ORAL at 20:11

## 2019-05-19 RX ADMIN — OXYCODONE HYDROCHLORIDE AND ACETAMINOPHEN 2 TABLET: 5; 325 TABLET ORAL at 06:00

## 2019-05-19 RX ADMIN — DOCUSATE SODIUM 100 MG: 100 CAPSULE, LIQUID FILLED ORAL at 20:11

## 2019-05-19 RX ADMIN — GENTAMICIN SULFATE 80 MG: 80 INJECTION, SOLUTION INTRAVENOUS at 11:32

## 2019-05-19 RX ADMIN — VANCOMYCIN HYDROCHLORIDE 1250 MG: 10 INJECTION, POWDER, LYOPHILIZED, FOR SOLUTION INTRAVENOUS at 11:32

## 2019-05-19 RX ADMIN — OXYCODONE HYDROCHLORIDE AND ACETAMINOPHEN 2 TABLET: 5; 325 TABLET ORAL at 13:16

## 2019-05-19 RX ADMIN — CEFEPIME 2 G: 2 INJECTION, POWDER, FOR SOLUTION INTRAVENOUS at 05:59

## 2019-05-19 RX ADMIN — Medication 10 ML: at 20:12

## 2019-05-19 RX ADMIN — AMPICILLIN SODIUM 2 G: 2 INJECTION, POWDER, FOR SOLUTION INTRAVENOUS at 23:32

## 2019-05-19 RX ADMIN — GABAPENTIN 300 MG: 300 CAPSULE ORAL at 13:16

## 2019-05-19 RX ADMIN — OXYCODONE HYDROCHLORIDE AND ACETAMINOPHEN 2 TABLET: 5; 325 TABLET ORAL at 21:00

## 2019-05-19 RX ADMIN — AMPICILLIN SODIUM 2 G: 2 INJECTION, POWDER, FOR SOLUTION INTRAVENOUS at 19:54

## 2019-05-19 RX ADMIN — VANCOMYCIN HYDROCHLORIDE 1250 MG: 10 INJECTION, POWDER, LYOPHILIZED, FOR SOLUTION INTRAVENOUS at 01:49

## 2019-05-19 RX ADMIN — ENOXAPARIN SODIUM 40 MG: 40 INJECTION SUBCUTANEOUS at 09:16

## 2019-05-19 RX ADMIN — THIAMINE HCL TAB 100 MG 100 MG: 100 TAB at 09:16

## 2019-05-19 RX ADMIN — SERTRALINE HYDROCHLORIDE 50 MG: 50 TABLET ORAL at 20:11

## 2019-05-19 RX ADMIN — AMPICILLIN SODIUM 2 G: 2 INJECTION, POWDER, FOR SOLUTION INTRAVENOUS at 16:04

## 2019-05-19 RX ADMIN — SODIUM CHLORIDE: 9 INJECTION, SOLUTION INTRAVENOUS at 05:38

## 2019-05-19 RX ADMIN — DOCUSATE SODIUM 100 MG: 100 CAPSULE, LIQUID FILLED ORAL at 09:19

## 2019-05-19 RX ADMIN — AMPICILLIN SODIUM 2 G: 2 INJECTION, POWDER, FOR SOLUTION INTRAVENOUS at 13:46

## 2019-05-19 RX ADMIN — GABAPENTIN 300 MG: 300 CAPSULE ORAL at 09:16

## 2019-05-19 RX ADMIN — FOLIC ACID 1 MG: 1 TABLET ORAL at 09:16

## 2019-05-19 RX ADMIN — BISACODYL 5 MG: 5 TABLET, COATED ORAL at 09:19

## 2019-05-19 RX ADMIN — GABAPENTIN 300 MG: 300 CAPSULE ORAL at 20:11

## 2019-05-19 ASSESSMENT — PAIN SCALES - GENERAL
PAINLEVEL_OUTOF10: 7
PAINLEVEL_OUTOF10: 7
PAINLEVEL_OUTOF10: 2
PAINLEVEL_OUTOF10: 0
PAINLEVEL_OUTOF10: 7
PAINLEVEL_OUTOF10: 8

## 2019-05-19 ASSESSMENT — PAIN DESCRIPTION - DESCRIPTORS: DESCRIPTORS: STABBING;THROBBING

## 2019-05-19 ASSESSMENT — PAIN DESCRIPTION - PAIN TYPE: TYPE: SURGICAL PAIN

## 2019-05-19 ASSESSMENT — PAIN DESCRIPTION - LOCATION: LOCATION: FOOT

## 2019-05-19 ASSESSMENT — PAIN DESCRIPTION - ORIENTATION: ORIENTATION: LEFT

## 2019-05-19 NOTE — PROGRESS NOTES
Pharmacy Consultation Note  (Antibiotic Dosing and Monitoring)    Initial consult date: 5/15/19  Consulting physician: Gomez Simmons CNP  Drug(s): Vancomycin  Indication: Cellulitis, abscess    Vancomycin discontinued, pharmacy will sign off. Please re-consult if needed.       Thank you,    Thelma Marshall, PharmD, BCPS 5/19/2019 12:47 PM   [phone]

## 2019-05-19 NOTE — PROGRESS NOTES
PHYSICAL THERAPY TREATMENT NOTE    5/19/2019  [id number]                      [facility]   [id number]                              1970    Patient Active Problem List   Diagnosis    Leukocytosis    Risk for falls    Cellulitis and abscess of foot    Pain in left foot    Cellulitis of left foot    Abscess of foot    Neutrophilic leukocytosis       Recommendation for discharge: LTAC  Vs HHPT with assistance  Equipment prescriptions needed: to be determined    AM-[facility] Mobility Inpatient   How much difficulty turning over in bed?: A Little  How much difficulty sitting down on / standing up from a chair with arms?: A Little  How much difficulty moving from lying on back to sitting on side of bed?: A Little  How much help from another person moving to and from a bed to a chair?: A Little  How much help from another person needed to walk in hospital room?: A Little  How much help from another person for climbing 3-5 steps with a railing?: A Lot  AM-PAC Inpatient Mobility Raw Score : 17  AM-PAC Inpatient T-Scale Score : 42.13  Mobility Inpatient CMS 0-100% Score: 50.57  Mobility Inpatient CMS G-Code Modifier : CK      Precautions: falls, impulsive, NWB L, wound L foot surgical shoe, short term memory deficits per charting, B foot drop AFO's, 5/17 pm s/p LEFT PLANTAR FOOT INCISION AND DRAINAGE OF ABSCESS  WOUND GENTAMICIN GAUZE PACKING (PULSE VAC)       S: Patient cleared by nursing for treatment. Patient is agreeable to treatment. Pt c/o pain in LLE. Pt was medicated for pain prior to treatment. Pain status: (measured on a visual analog scale with 0=no pain and 10=excruciating pain) 8/10 with movement. O: Pt was instructed in and performed the following:   Bed Mobility- Supine to sit- Supervision     Scooting- Supervision    Sit to supine- Supervision    Transfers-sit to stand- Minimal assistance    Gait: Patient ambulated 40 feet x 2 using wheeled walker with Minimal assistance.  Comments: V/C were needed for safety, upright posture, walker management, pacing, and obstacle negotiation. Steps: NA  Treatment: Pt was educated and facilitated on techniques to increase safety and independence with bed mobility, balance, functional transfers, and functional mobility. Pt transferred to side of bed and sat up x 3 minutes to increase dynamic sitting balance and activity tolerance. Pt stood and ambulated in hallway. Pt RTB and reviewed supine BLE exercises and instructed to perform 3x/day. Comment: Call light left within reach of patient. Pt was returned to bed at end of treatment, alarm was activated. A: Pt was able to progress ambulation distance with no LOB noted. Pt was motivated to progress but fatigued easily with c/o pain that limited further activity. P: Continue with physical therapy       Eryn Monsivais PTA    GOALS to be met in 2 days. Bed mobility- Independent                                         Transfers-Sit to stand- Modified Independent                Gait:  Patient to ambulate 50 feet using wheeled walker with Modified Independent                Steps: Patient to go up and down 4 step(s) using 1 rail(s) with Supervision         Increase rom in affected joints by 10%  Increase strength in affected mm groups by 1/3 grade  Increase balance to allow for improvement towards functional goals. Increase endurance to allow for improvement towards functional goals.

## 2019-05-19 NOTE — PLAN OF CARE
Problem: Falls - Risk of:  Goal: Will remain free from falls  Description  Will remain free from falls  Outcome: Met This Shift  Goal: Absence of physical injury  Description  Absence of physical injury  Outcome: Met This Shift     Problem: Pain:  Goal: Pain level will decrease  Description  Pain level will decrease  Outcome: Met This Shift  Goal: Control of acute pain  Description  Control of acute pain  Outcome: Met This Shift  Goal: Control of chronic pain  Description  Control of chronic pain  Outcome: Completed     Problem: Mobility - Impaired:  Goal: Mobility will improve  Description  Mobility will improve  Outcome: Met This Shift     Problem:  Activity:  Goal: Ability to tolerate increased activity will improve  Description  Ability to tolerate increased activity will improve  Outcome: Met This Shift     Problem: Skin Integrity:  Goal: Demonstration of wound healing without infection will improve  Description  Demonstration of wound healing without infection will improve  Outcome: Met This Shift  Goal: Complications related to intravenous access or infusion will be avoided or minimized  Description  Complications related to intravenous access or infusion will be avoided or minimized  Outcome: Met This Shift     Problem: Nutrition  Goal: Optimal nutrition therapy  Outcome: Met This Shift

## 2019-05-19 NOTE — PROGRESS NOTES
[facility]  PROGRESS NOTE  Admit Date:  5/15/2019   NAME:  Althea Alsotn  MRN:  [id number]  :  1970  Age:   51 y.o. PCP:   Georgie Russell MD, [address] [facility] Day: Hospital Day: 5    Subjective:   Pt was seen and examined in a face to face encounter for FOOT INFECTION with CC of NONE  Chief Complaint   Patient presents with    Foot Injury     sent in from doctor for infection      HPI:   Feels better  s.p  SURGERY    PAIN WITH LEFT le s/p dressing change   No FAMILY PRESENT    ROS:  CONSTITUTIONAL:   No fever, chills, weight loss, loss of appetite  ALLERGIES:    No urticaria, hay fever,    EYES:     No blurry vision, loss of vision,eye pain  ENT:      No hearing loss, sore throat  CARDIOVASCULAR:  No chest pain, palpitations  RESPIRATORY:    No cough, sob  HEME-LYMPH:   No easy bruising, bleeding  GI:     No nausea, vomiting or diarrhea  :     No urinary complaints  NEURO:    No  lightheadedness, dizziness, confusion,   MUSCULOSKELETAL:   muscle aches, pain   SKIN:     No rash or itch  PSYCH:    No depression or anxiety      Scheduled Meds:   docusate sodium  100 mg Oral BID    bisacodyl  5 mg Oral BID    sodium chloride flush  10 mL Intravenous 2 times per day    irrigation builder  80 mg Irrigation Daily    vancomycin  1,250 mg Intravenous Q8H    cefepime  2 g Intravenous O45W    folic acid  1 mg Oral Daily    gabapentin  300 mg Oral TID    sertraline  50 mg Oral Nightly    vitamin B-1  100 mg Oral Daily    enoxaparin  40 mg Subcutaneous Daily     Continuous Infusions:    PRN Meds:sodium chloride flush, oxyCODONE-acetaminophen **OR** oxyCODONE-acetaminophen, fentanNYL, melatonin, magnesium hydroxide, ondansetron, acetaminophen    ALLERGIES: Patient has no known allergies. Objective:   Vitals reviewed.   Vitals:    19 2015 19 0000 19 0400 19 0900   BP: (!) 151/67 133/65 124/68 133/60   Pulse: 79 71 68 88   Resp: 12 16 16 16   Temp: 98.9 °F (37.2 °C) 98.7 °F (37.1 °C) 98.2 °F (36.8 °C) 97.4 °F (36.3 °C)   TempSrc: Oral Oral Oral Oral   SpO2: 98% 97%  95%   Weight:       Height:         Physical Exam  Constitutional: The patient is awake, alert, and oriented. Skin: Warm and dry. No rashes were noted. HEENT: Eyes show round, and reactive pupils. Neck: Supple to movements. Chest: No use of accessory muscles to breathe. Cardiovascular: S1 and S2 are rhythmic and regular. Abdomen: Positive bowel sounds to auscultation. Benign to palpation. Extremities: No clubbing, no cyanosis, no edema. FOOT DRESSED    CNS: awake and alert  Psych:  pleasant  Lines: peripheral    I & O - 24hr:    Intake/Output Summary (Last 24 hours) at 5/19/2019 1005  Last data filed at 5/19/2019 0558  Gross per 24 hour   Intake 2208 ml   Output 2400 ml   Net -192 ml     No intake/output data recorded. Weight change:   LABS:    Recent Labs     05/17/19  0432 05/18/19  0506   WBC 8.0 8.8   HGB 11.4* 11.2*   HCT 34.5 33.6*   MCV 92.7 93.1    263     Recent Labs     05/17/19  0432 05/18/19  0506 05/19/19  0456    138 138   K 4.2 3.9 4.0    102 104   CO2 24 27 26   BUN 8 10 7   CREATININE 0.4* 0.5 0.5   GFRAA >60 >60 >60   LABGLOM >60 >60 >60   GLUCOSE 104* 100* 94   CALCIUM 8.5* 8.9 8.8      No results for input(s): PROCAL in the last 72 hours. Lab Results   Component Value Date    SEDRATE 63 (H) 05/15/2019     Lab Results   Component Value Date    CRP 9.1 (H) 05/15/2019       Recent Labs     05/16/19  1841 05/17/19  2058   [facility] 5.8 17.7*       MICROBIOLOGY:   No results for input(s): BC in the last 72 hours. No results for input(s): Veatrice Banker in the last 72 hours.   Lab Results   Component Value Date    BLOODCULT2 24 Hours- no growth 05/15/2019       Organism   Date Value Ref Range Status   05/17/2019 Enterococcus faecalis (A)  Final     WOUND/ABSCESS   Date Value Ref Range Status   05/15/2019 Heavy growth  Final RADIOLOGY:  MRI FOOT LEFT WO CONTRAST   Final Result   1. Suggestion of soft tissue irregularity within the medial and   plantar soft tissues are overlying the great toe and 1st metatarsal.   No discrete collection is identified to suggest an abscess. 2. No confluent hypointense T1 marrow signal within the great toe or   1st metatarsal to suggest acute osteomyelitis. 3. Small 2nd MTP joint effusion with patchy edema within the 2nd   metatarsal head, possibly reactive, although early changes of   osteomyelitis may have a similar appearance. 4. Mild arthrosis at the 1st TMT joint. XR FOOT LEFT (MIN 3 VIEWS)   Final Result   1. There is no appreciable soft tissue foreign body and there are no   plain film findings of osteomyelitis. 2. Minimal soft tissue emphysema is seen along the lateral cortex of   the first metatarsal. The findings are suggestive of a soft tissue   abscess. XR TIBIA FIBULA LEFT (2 VIEWS)   Final Result   1. Unremarkable left tibia and fibula. Assessment/Plan:   51 y.o. female presented with   Chief Complaint   Patient presents with    Foot Injury     sent in from doctor for infection             Leukocytosis   Pain in left foot    Cellulitis of left foot    Abscess of foot -Enterococcus    MRI 1. Suggestion of soft tissue irregularity within the medial and  plantar soft tissues are overlying the great toe and 1st metatarsal.  No discrete collection is identified to suggest an abscess. 2. No confluent hypointense T1 marrow signal within the great toe or  1st metatarsal to suggest acute osteomyelitis. 3. Small 2nd MTP joint effusion with patchy edema within the 2nd  metatarsal head, possibly reactive, although early changes of  osteomyelitis may have a similar appearance. 4. Mild arthrosis at the 1st TMT joint.      -prelim CX gpc Enterococcus faecalis  S/p LEFT PLANTAR FOOT INCISION AND DRAINAGE OF ABSCESS  WOUND GENTAMICIN GAUZE PACKING (PULSE

## 2019-05-19 NOTE — PROGRESS NOTES
Department of Podiatry  Resident Progress Note    Subjective  Patient seen bedside for s/p plantar left foot, abscess of foot on 05//17/19 by Dr. Meli Yu DPM.  Pt complains of left foot pain. No acute events overnight. Patient denies any N/V/D/F/C/SOB/CP and has no other complaints at this time. Objective    Past Medical History:   Diagnosis Date    Anxiety     Depression     Foot drop, bilateral     Neutrophilic leukocytosis        Past Surgical History:   Procedure Laterality Date    CYST REMOVAL      TUBAL LIGATION         History reviewed. No pertinent family history. No Known Allergies      Vascular: DP and PT pulses 2/4 b/l. CFT <5 secs b/l. Skin temperature warm to cool from proximal to distal.   Neuro: Protective Sensation diminished b/l. Derm: Skin appears supple. Hyperkeratotic tissue noted to the plantar aspect of the 1st MPJ and medial aspect of the hallux of the right foot. Toenails 1-5 b/l are dystrophic. Webspaces 1-4 b/l are C/D/I. Upon removal of the surgical dressing, red beefy granular tissue can be observed. Fascia and tendons are exposed. Probes to bone. Serosanguineous drainage, and decrease in erythema noted. noted. Wound on the medial aspect of hallux on the left foot serosanguineous drainage, no malodor, and decrease in erythema. Plantar wound on the 1st MPJ that has mild serosanguineous drainage, no malodor and decrease in erythema. Mild diffuse swelling to the left foot. Probes to bone, the plantar wound. Musculoskeletal: Muscle Strength 5/5 for all groups b/l Pain to palpation to the surgical site on the plantar aspect of left foot. Wound imaging:                      Media Information              Document Information           05/19/2019 11:09   Attached To: Hospital Encounter on 5/15/19     Source Information     [address] Med Surg     Wound Care Documentation:  Wound 05/15/19 Foot Plantar; Inner;Left red, swollen, open ulceration (Active) Wound Image   5/16/2019 12:11 PM   Wound Traumatic 5/16/2019  8:00 AM   Dressing Status Dry; Intact; Clean 5/19/2019  8:00 AM   Dressing Changed Other (Comment) 5/19/2019  8:00 AM   Dressing/Treatment Dry Dressing; Ace Wrap 5/19/2019  8:00 AM   Wound Length (cm) 5 cm 5/16/2019 12:11 PM   Wound Width (cm) 5 cm 5/16/2019 12:11 PM   Wound Surface Area (cm^2) 25 cm^2 5/16/2019 12:11 PM   Change in Wound Size % (l*w) 37.5 5/16/2019 12:11 PM   Wound Assessment GUILLERMO 5/18/2019  3:40 PM   Drainage Amount GUILLERMO 5/19/2019  8:00 AM   Odor None 5/16/2019 11:00 PM   Natalie-wound Assessment Red 5/16/2019 12:11 PM   Number of days: 3       Wound 05/15/19 Foot Left (Active)   Wound Traumatic 5/16/2019  8:00 AM   Dressing Status Dry; Intact; Clean 5/19/2019  8:00 AM   Dressing Changed Other (Comment) 5/19/2019  8:00 AM   Dressing/Treatment Dry Dressing 5/19/2019  8:00 AM   Wound Length (cm) 1.5 cm 5/16/2019 12:11 PM   Wound Width (cm) 1.5 cm 5/16/2019 12:11 PM   Wound Surface Area (cm^2) 2.25 cm^2 5/16/2019 12:11 PM   Change in Wound Size % (l*w) 0 5/16/2019 12:11 PM   Wound Assessment GUILLERMO 5/19/2019  8:00 AM   Drainage Amount GUILLERMO 5/19/2019  8:00 AM   Odor None 5/16/2019 11:00 PM   Number of days: 3       Wound 05/15/19 Toe (Comment  which one) Left (Active)   Wound Traumatic 5/16/2019  8:00 AM   Dressing Status Dry; Intact; Clean 5/19/2019  8:00 AM   Dressing Changed Other (Comment) 5/19/2019  8:00 AM   Dressing/Treatment Dry Dressing 5/19/2019  8:00 AM   Wound Length (cm) 1.5 cm 5/16/2019 12:11 PM   Wound Width (cm) 0.5 cm 5/16/2019 12:11 PM   Wound Surface Area (cm^2) 0.75 cm^2 5/16/2019 12:11 PM   Change in Wound Size % (l*w) 0 5/16/2019 12:11 PM   Wound Assessment GUILLERMO 5/19/2019  8:00 AM   Drainage Amount GUILLERMO 5/19/2019  8:00 AM   Odor None 5/19/2019  8:00 AM   Number of days: 3       Wound 05/15/19 Foot Right (Active)   Wound Traumatic 5/16/2019  8:00 AM   Dressing/Treatment Open to air 5/19/2019  8:00 AM   Wound Length (cm) 1.5 cm 05/19/2019       Scheduled Meds:   docusate sodium  100 mg Oral BID    bisacodyl  5 mg Oral BID    ampicillin IV  2 g Intravenous 6 times per day    sodium chloride flush  10 mL Intravenous 2 times per day    irrigation builder  80 mg Irrigation Daily    folic acid  1 mg Oral Daily    gabapentin  300 mg Oral TID    sertraline  50 mg Oral Nightly    vitamin B-1  100 mg Oral Daily    enoxaparin  40 mg Subcutaneous Daily     Continuous Infusions:  PRN Meds:.sodium chloride flush, oxyCODONE-acetaminophen **OR** oxyCODONE-acetaminophen, fentanNYL, melatonin, magnesium hydroxide, ondansetron, acetaminophen    Diagnostics  XR FOOT LEFT (MIN 3 VIEWS)   Status: Final result   Order Providers      Authorizing Billing   Omero Vuong MD           Signed by      Signed Date/Time   Phone Pager   [facility] [place] - 2 5/15/2019 18:36 [phone]     [facility]      Read Date Phone Pager   [facility] [place] - 2 May 15, 2019 [phone]     Radiation Dose Estimates      No radiation information found for this patient   Narrative   Reading location:  200       HISTORY: Wound along the plantar aspect of the left foot.       TECHNIQUE: 3 views of the left foot were obtained.        FINDINGS: There is no fracture or dislocation of the left foot. Prominent soft tissue is seen along the plantar aspect of the midfoot.       On the frontal view of the foot, there is subcutaneous emphysema seen   along the lateral cortex of the first metatarsal. There is no   periosteal reaction to suggest osteomyelitis.           Impression   1. There is no appreciable soft tissue foreign body and there are no   plain film findings of osteomyelitis.    2. Minimal soft tissue emphysema is seen along the lateral cortex of   the first metatarsal. The findings are suggestive of a soft tissue   abscess.             MRI FOOT LEFT WO CONTRAST   Status: Final result   Order Providers      Authorizing Billing   [facility] Fito Kayla MD           Signed by      Signed Date/Time   Phone Pager   [place] 5/16/2019 09:05 [phone]     [facility]      Read Date Phone Pager   [place] May 16, 2019 [phone]     Orders Requiring a Screening Form      Procedure Order Status Form Status    MRI FOOT LEFT WO CONTRAST Completed Created   Radiation Dose Estimates      No radiation information found for this patient   Narrative   [place] location:  200       Indication: Left foot ulcers, evaluate for osteomyelitis.       Comparison: Left foot radiograph from 5/15/2019.       Technique: Multiplanar, multisequence MR imaging of the left forefoot   was performed without administration of intravenous contrast.       FINDINGS:       There is suggestion of multifocal areas of soft tissue irregularity   within the medial soft tissues overlying the interphalangeal joint of   the great toe, medial soft tissues overlying the mid shaft of the 1st   metatarsal, and within the plantar soft tissues at the level of the   1st metatarsophalangeal joint. There is no discrete loculated   collection to suggest an abscess. There is no confluent hypointense T1   marrow signal within the great toe or 1st metatarsal to suggest acute   osteomyelitis.       There is a small effusion within the 2nd metatarsophalangeal joint   with a patchy area of edema within the 2nd metatarsal head.       There is no evidence of acute fracture. There is mild arthrosis at the   1st tarsometatarsal joint with subchondral reactive change within the   distal and dorsal aspect of the medial cuneiform.       There is a trace effusion within the 1st metatarsophalangeal joint.       The interosseous band of the Lisfranc ligament is grossly intact. There is diffuse increased signal and atrophy of the muscles of the   forefoot, likely related to denervation. The visualized tendons are   grossly intact.           Impression   1.  Suggestion of soft tissue irregularity within the medial and   plantar soft tissues are overlying the great toe and 1st metatarsal.   No discrete collection is identified to suggest an abscess. 2. No confluent hypointense T1 marrow signal within the great toe or   1st metatarsal to suggest acute osteomyelitis. 3. Small 2nd MTP joint effusion with patchy edema within the 2nd   metatarsal head, possibly reactive, although early changes of   osteomyelitis may have a similar appearance. 4. Mild arthrosis at the 1st TMT joint.                  Assessment  1.s/p plantar left foot, abscess of foot on 05/17/19 done by Dr. Carlo Tompkins DPM  2. Left foot wound  3. Cellulitis and abscess of foot            Plan  - Patient was examined and evaluated. - Reviewed patient's recent lab results and pertinent diagnostic imaging.  - Reviewed ancillary service notes. - Dressing change per podiatry with gentamycin soak gauze, 4x4, ABD and kerlix. And xeroform to the hallux. - Per ID-Antibiotics current- ampicillin  - Elevate limb at all time on the bed  -Consult place to case management for select or to vibra pending   - Discussed patient with Dr. Carlo Tompkins DPM.  - Will continue to follow patient while they are in-house.

## 2019-05-19 NOTE — PROGRESS NOTES
[address] [place], [place] [address]ist   Progress Note    Admitting Date and Time: 5/15/2019  5:24 PM  Admit Dx: Abscess of left foot [L02.612]    Subjective: Patient complains of constipation. No BM in several days. She also has coronary polyuria. She is eating reasonably well. Minimal discomfort left foot. ROS: denies fever, chills, cp, sob, n/v, HA unless stated above.      docusate sodium  100 mg Oral BID    bisacodyl  5 mg Oral BID    sodium chloride flush  10 mL Intravenous 2 times per day    irrigation builder  80 mg Irrigation Daily    vancomycin  1,250 mg Intravenous Q8H    cefepime  2 g Intravenous S67T    folic acid  1 mg Oral Daily    gabapentin  300 mg Oral TID    sertraline  50 mg Oral Nightly    vitamin B-1  100 mg Oral Daily    enoxaparin  40 mg Subcutaneous Daily       sodium chloride flush 10 mL PRN   oxyCODONE-acetaminophen 1 tablet Q4H PRN   Or     oxyCODONE-acetaminophen 2 tablet Q4H PRN   fentanNYL 50 mcg Q5 Min PRN   melatonin 3 mg Nightly PRN   magnesium hydroxide 30 mL Daily PRN   ondansetron 4 mg Q6H PRN   acetaminophen 650 mg Q4H PRN        Objective:    /68   Pulse 68   Temp 98.2 °F (36.8 °C) (Oral)   Resp 16   Ht 6' (1.829 m)   Wt 169 lb (76.7 kg)   SpO2 97%   BMI 22.92 kg/m²   General Appearance: alert and oriented to person, place and time and in no acute distress  Skin: warm and dry  Head: normocephalic and atraumatic  Eyes: pupils equal, round, and reactive to light, extraocular eye movements intact, conjunctivae normal  Neck: neck supple and non tender without mass   Pulmonary/Chest: clear to auscultation bilaterally- no wheezes, rales or rhonchi, normal air movement, no respiratory distress  Cardiovascular: normal rate, normal S1 and S2 and no carotid bruits  Abdomen: soft, non-tender, non-distended, normal bowel sounds, no masses or organomegaly  Extremities: no cyanosis, no clubbing and no edema extensive bandages left foot  Neurologic: no cranial nerve deficit and speech normal      Recent Labs     05/17/19  0432 05/18/19  0506 05/19/19  0456    138 138   K 4.2 3.9 4.0    102 104   CO2 24 27 26   BUN 8 10 7   CREATININE 0.4* 0.5 0.5   GLUCOSE 104* 100* 94   CALCIUM 8.5* 8.9 8.8       Recent Labs     05/17/19  0432 05/18/19  0506   WBC 8.0 8.8   RBC 3.72 3.61   HGB 11.4* 11.2*   HCT 34.5 33.6*   MCV 92.7 93.1   MCH 30.6 31.0   MCHC 33.0 33.3   RDW 12.3 12.2    263   MPV 9.8 9.4           Radiology:   MRI FOOT LEFT WO CONTRAST   Final Result   1. Suggestion of soft tissue irregularity within the medial and   plantar soft tissues are overlying the great toe and 1st metatarsal.   No discrete collection is identified to suggest an abscess. 2. No confluent hypointense T1 marrow signal within the great toe or   1st metatarsal to suggest acute osteomyelitis. 3. Small 2nd MTP joint effusion with patchy edema within the 2nd   metatarsal head, possibly reactive, although early changes of   osteomyelitis may have a similar appearance. 4. Mild arthrosis at the 1st TMT joint. XR FOOT LEFT (MIN 3 VIEWS)   Final Result   1. There is no appreciable soft tissue foreign body and there are no   plain film findings of osteomyelitis. 2. Minimal soft tissue emphysema is seen along the lateral cortex of   the first metatarsal. The findings are suggestive of a soft tissue   abscess. XR TIBIA FIBULA LEFT (2 VIEWS)   Final Result   1. Unremarkable left tibia and fibula. Assessment:  Principal Problem:    Cellulitis and abscess of foot  Active Problems:    Leukocytosis    Risk for falls    Pain in left foot    Cellulitis of left foot    Abscess of foot    Neutrophilic leukocytosis  Resolved Problems:    * No resolved hospital problems. *      Plan:  1.  Enterococcus faecalis cellulitis and abscess of left foot--patient failed outpatient treatment with Bactrim and Keflex however she did irritate the area with some caustic chemicals to clean

## 2019-05-20 ENCOUNTER — APPOINTMENT (OUTPATIENT)
Dept: INTERVENTIONAL RADIOLOGY/VASCULAR | Age: 49
DRG: 571 | End: 2019-05-20
Payer: MEDICARE

## 2019-05-20 LAB
ANION GAP SERPL CALCULATED.3IONS-SCNC: 9 MMOL/L (ref 7–16)
BLOOD CULTURE, ROUTINE: NORMAL
BUN BLDV-MCNC: 9 MG/DL (ref 6–20)
CALCIUM SERPL-MCNC: 9 MG/DL (ref 8.6–10.2)
CHLORIDE BLD-SCNC: 103 MMOL/L (ref 98–107)
CO2: 29 MMOL/L (ref 22–29)
CREAT SERPL-MCNC: 0.5 MG/DL (ref 0.5–1)
CULTURE, BLOOD 2: NORMAL
GFR AFRICAN AMERICAN: >60
GFR NON-AFRICAN AMERICAN: >60 ML/MIN/1.73
GLUCOSE BLD-MCNC: 98 MG/DL (ref 74–99)
INR BLD: 1
POTASSIUM SERPL-SCNC: 4 MMOL/L (ref 3.5–5)
PROTHROMBIN TIME: 11.5 SEC (ref 9.3–12.4)
SODIUM BLD-SCNC: 141 MMOL/L (ref 132–146)

## 2019-05-20 PROCEDURE — 6360000002 HC RX W HCPCS: Performed by: SPECIALIST

## 2019-05-20 PROCEDURE — 2580000003 HC RX 258: Performed by: SPECIALIST

## 2019-05-20 PROCEDURE — 2500000003 HC RX 250 WO HCPCS: Performed by: RADIOLOGY

## 2019-05-20 PROCEDURE — 1200000000 HC SEMI PRIVATE

## 2019-05-20 PROCEDURE — 36415 COLL VENOUS BLD VENIPUNCTURE: CPT

## 2019-05-20 PROCEDURE — 6370000000 HC RX 637 (ALT 250 FOR IP): Performed by: NURSE PRACTITIONER

## 2019-05-20 PROCEDURE — 6360000002 HC RX W HCPCS: Performed by: PODIATRIST

## 2019-05-20 PROCEDURE — C1751 CATH, INF, PER/CENT/MIDLINE: HCPCS

## 2019-05-20 PROCEDURE — 02HV33Z INSERTION OF INFUSION DEVICE INTO SUPERIOR VENA CAVA, PERCUTANEOUS APPROACH: ICD-10-PCS | Performed by: RADIOLOGY

## 2019-05-20 PROCEDURE — 6370000000 HC RX 637 (ALT 250 FOR IP): Performed by: STUDENT IN AN ORGANIZED HEALTH CARE EDUCATION/TRAINING PROGRAM

## 2019-05-20 PROCEDURE — 6370000000 HC RX 637 (ALT 250 FOR IP): Performed by: INTERNAL MEDICINE

## 2019-05-20 PROCEDURE — 36410 VNPNXR 3YR/> PHY/QHP DX/THER: CPT | Performed by: RADIOLOGY

## 2019-05-20 PROCEDURE — 85610 PROTHROMBIN TIME: CPT

## 2019-05-20 PROCEDURE — 76937 US GUIDE VASCULAR ACCESS: CPT | Performed by: RADIOLOGY

## 2019-05-20 PROCEDURE — 80048 BASIC METABOLIC PNL TOTAL CA: CPT

## 2019-05-20 PROCEDURE — 2580000003 HC RX 258: Performed by: STUDENT IN AN ORGANIZED HEALTH CARE EDUCATION/TRAINING PROGRAM

## 2019-05-20 PROCEDURE — 99232 SBSQ HOSP IP/OBS MODERATE 35: CPT | Performed by: INTERNAL MEDICINE

## 2019-05-20 PROCEDURE — 76000 FLUOROSCOPY <1 HR PHYS/QHP: CPT | Performed by: RADIOLOGY

## 2019-05-20 PROCEDURE — APPSS30 APP SPLIT SHARED TIME 16-30 MINUTES: Performed by: NURSE PRACTITIONER

## 2019-05-20 RX ORDER — OXYCODONE HYDROCHLORIDE AND ACETAMINOPHEN 5; 325 MG/1; MG/1
1 TABLET ORAL ONCE
Status: COMPLETED | OUTPATIENT
Start: 2019-05-20 | End: 2019-05-20

## 2019-05-20 RX ORDER — LIDOCAINE HYDROCHLORIDE 10 MG/ML
10 INJECTION, SOLUTION INFILTRATION; PERINEURAL ONCE
Status: COMPLETED | OUTPATIENT
Start: 2019-05-20 | End: 2019-05-20

## 2019-05-20 RX ORDER — POLYETHYLENE GLYCOL 3350 17 G/17G
17 POWDER, FOR SOLUTION ORAL DAILY
Status: DISCONTINUED | OUTPATIENT
Start: 2019-05-20 | End: 2019-05-21 | Stop reason: HOSPADM

## 2019-05-20 RX ADMIN — AMPICILLIN SODIUM 2 G: 2 INJECTION, POWDER, FOR SOLUTION INTRAVENOUS at 17:25

## 2019-05-20 RX ADMIN — THIAMINE HCL TAB 100 MG 100 MG: 100 TAB at 10:00

## 2019-05-20 RX ADMIN — LIDOCAINE HYDROCHLORIDE 10 ML: 10 INJECTION, SOLUTION INFILTRATION; PERINEURAL at 09:02

## 2019-05-20 RX ADMIN — AMPICILLIN SODIUM 2 G: 2 INJECTION, POWDER, FOR SOLUTION INTRAVENOUS at 03:32

## 2019-05-20 RX ADMIN — OXYCODONE HYDROCHLORIDE AND ACETAMINOPHEN 2 TABLET: 5; 325 TABLET ORAL at 08:07

## 2019-05-20 RX ADMIN — AMPICILLIN SODIUM 2 G: 2 INJECTION, POWDER, FOR SOLUTION INTRAVENOUS at 20:36

## 2019-05-20 RX ADMIN — DOCUSATE SODIUM 100 MG: 100 CAPSULE, LIQUID FILLED ORAL at 10:00

## 2019-05-20 RX ADMIN — OXYCODONE HYDROCHLORIDE AND ACETAMINOPHEN 2 TABLET: 5; 325 TABLET ORAL at 02:51

## 2019-05-20 RX ADMIN — GABAPENTIN 300 MG: 300 CAPSULE ORAL at 10:00

## 2019-05-20 RX ADMIN — GABAPENTIN 300 MG: 300 CAPSULE ORAL at 16:17

## 2019-05-20 RX ADMIN — OXYCODONE HYDROCHLORIDE AND ACETAMINOPHEN 2 TABLET: 5; 325 TABLET ORAL at 18:16

## 2019-05-20 RX ADMIN — OXYCODONE HYDROCHLORIDE AND ACETAMINOPHEN 1 TABLET: 5; 325 TABLET ORAL at 11:08

## 2019-05-20 RX ADMIN — FOLIC ACID 1 MG: 1 TABLET ORAL at 10:00

## 2019-05-20 RX ADMIN — GENTAMICIN SULFATE 80 MG: 80 INJECTION, SOLUTION INTRAVENOUS at 11:09

## 2019-05-20 RX ADMIN — OXYCODONE HYDROCHLORIDE AND ACETAMINOPHEN 2 TABLET: 5; 325 TABLET ORAL at 22:54

## 2019-05-20 RX ADMIN — DOCUSATE SODIUM 100 MG: 100 CAPSULE, LIQUID FILLED ORAL at 20:36

## 2019-05-20 RX ADMIN — SERTRALINE HYDROCHLORIDE 50 MG: 50 TABLET ORAL at 20:36

## 2019-05-20 RX ADMIN — AMPICILLIN SODIUM 2 G: 2 INJECTION, POWDER, FOR SOLUTION INTRAVENOUS at 13:48

## 2019-05-20 RX ADMIN — AMPICILLIN SODIUM 2 G: 2 INJECTION, POWDER, FOR SOLUTION INTRAVENOUS at 10:00

## 2019-05-20 RX ADMIN — BISACODYL 5 MG: 5 TABLET, COATED ORAL at 10:00

## 2019-05-20 RX ADMIN — Medication 10 ML: at 20:35

## 2019-05-20 RX ADMIN — GABAPENTIN 300 MG: 300 CAPSULE ORAL at 20:36

## 2019-05-20 RX ADMIN — POLYETHYLENE GLYCOL 3350 17 G: 17 POWDER, FOR SOLUTION ORAL at 17:25

## 2019-05-20 RX ADMIN — Medication 10 ML: at 10:01

## 2019-05-20 ASSESSMENT — PAIN SCALES - GENERAL
PAINLEVEL_OUTOF10: 2
PAINLEVEL_OUTOF10: 7
PAINLEVEL_OUTOF10: 5
PAINLEVEL_OUTOF10: 8
PAINLEVEL_OUTOF10: 4

## 2019-05-20 ASSESSMENT — PAIN DESCRIPTION - PAIN TYPE
TYPE: SURGICAL PAIN
TYPE: SURGICAL PAIN

## 2019-05-20 ASSESSMENT — PAIN - FUNCTIONAL ASSESSMENT: PAIN_FUNCTIONAL_ASSESSMENT: PREVENTS OR INTERFERES SOME ACTIVE ACTIVITIES AND ADLS

## 2019-05-20 ASSESSMENT — PAIN DESCRIPTION - ORIENTATION
ORIENTATION: LEFT
ORIENTATION: LEFT

## 2019-05-20 ASSESSMENT — PAIN DESCRIPTION - DESCRIPTORS: DESCRIPTORS: ACHING;DISCOMFORT

## 2019-05-20 ASSESSMENT — PAIN DESCRIPTION - LOCATION
LOCATION: FOOT
LOCATION: FOOT

## 2019-05-20 ASSESSMENT — PAIN DESCRIPTION - ONSET: ONSET: GRADUAL

## 2019-05-20 ASSESSMENT — PAIN DESCRIPTION - FREQUENCY: FREQUENCY: INTERMITTENT

## 2019-05-20 ASSESSMENT — PAIN DESCRIPTION - PROGRESSION: CLINICAL_PROGRESSION: NOT CHANGED

## 2019-05-20 NOTE — PROGRESS NOTES
Physical Therapy    0340/0340-01    Patient unavailable for physical therapy treatment due to refusing d/t pain. Amelia Arciniega  PTA

## 2019-05-20 NOTE — PROGRESS NOTES
[facility]  PROGRESS NOTE  Admit Date:  5/15/2019   NAME:  Lona Saha  MRN:  [id number]  :  1970  Age:   47 y.o. PCP:   Martin Montiel MD, [address] [facility] Day: Hospital Day: 6    Subjective:   Pt was seen and examined in a face to face encounter for FOOT INFECTION with CC of NONE  Chief Complaint   Patient presents with    Foot Injury     sent in from doctor for infection      HPI:   Feels better  s.p  SURGERY   PAIN WITH LEFT le s/p dressing change better  Picture viewed with podiatry resident  No FAMILY PRESENT    ROS:  CONSTITUTIONAL:   No fever, chills, weight loss, loss of appetite  ALLERGIES:    No urticaria, hay fever,    EYES:     No blurry vision, loss of vision,eye pain  ENT:      No hearing loss, sore throat  CARDIOVASCULAR:  No chest pain, palpitations  RESPIRATORY:    No cough, sob  HEME-LYMPH:   No easy bruising, bleeding  GI:     No nausea, vomiting or diarrhea  :     No urinary complaints  NEURO:    No  lightheadedness, dizziness, confusion,   MUSCULOSKELETAL:   muscle aches, pain   SKIN:     No rash or itch  PSYCH:    No depression or anxiety      Scheduled Meds:   docusate sodium  100 mg Oral BID    bisacodyl  5 mg Oral BID    ampicillin IV  2 g Intravenous 6 times per day    sodium chloride flush  10 mL Intravenous 2 times per day    irrigation builder  80 mg Irrigation Daily    folic acid  1 mg Oral Daily    gabapentin  300 mg Oral TID    sertraline  50 mg Oral Nightly    vitamin B-1  100 mg Oral Daily    enoxaparin  40 mg Subcutaneous Daily     Continuous Infusions:    PRN Meds:sodium chloride flush, oxyCODONE-acetaminophen **OR** oxyCODONE-acetaminophen, fentanNYL, melatonin, magnesium hydroxide, ondansetron, acetaminophen    ALLERGIES: Patient has no known allergies. Objective:   Vitals reviewed.   Vitals:    19 0400 19 0900 19 1550 19   BP: 124/68 133/60 (!) 128/59 (!) 152/67   Pulse: 68 88 83 77   Resp: 16 16 18 16   Temp: 98.2 °F (36.8 °C) 97.4 °F (36.3 °C) 98.4 °F (36.9 °C) 98.5 °F (36.9 °C)   TempSrc: Oral Oral Oral Oral   SpO2:  95%  97%   Weight:       Height:         Physical Exam  Constitutional: The patient is awake, alert, and oriented. Skin: Warm and dry. No rashes were noted. HEENT: Eyes show round, and reactive pupils. Neck: Supple to movements. Chest: No use of accessory muscles to breathe. Cardiovascular: S1 and S2 are rhythmic and regular. Abdomen: Positive bowel sounds to auscultation. Benign to palpation. Extremities: No clubbing, no cyanosis, no edema. FOOT DRESSED    CNS: awake and alert  Psych:  pleasant  Lines: peripheral    I & O - 24hr:    Intake/Output Summary (Last 24 hours) at 5/20/2019 0748  Last data filed at 5/20/2019 0618  Gross per 24 hour   Intake 720 ml   Output 1700 ml   Net -980 ml     No intake/output data recorded. Weight change:   LABS:    Recent Labs     05/18/19  0506   WBC 8.8   HGB 11.2*   HCT 33.6*   MCV 93.1        Recent Labs     05/18/19  0506 05/19/19  0456 05/20/19  0438    138 141   K 3.9 4.0 4.0    104 103   CO2 27 26 29   BUN 10 7 9   CREATININE 0.5 0.5 0.5   GFRAA >60 >60 >60   LABGLOM >60 >60 >60   GLUCOSE 100* 94 98   CALCIUM 8.9 8.8 9.0      No results for input(s): PROCAL in the last 72 hours. Lab Results   Component Value Date    SEDRATE 63 (H) 05/15/2019     Lab Results   Component Value Date    CRP 9.1 (H) 05/15/2019       Recent Labs     05/17/19  2058   [facility].7*       MICROBIOLOGY:   No results for input(s): BC in the last 72 hours. No results for input(s): [place] in the last 72 hours.   Lab Results   Component Value Date    BLOODCULT2 24 Hours- no growth 05/15/2019       Organism   Date Value Ref Range Status   05/17/2019 Enterococcus faecalis (A)  Final     WOUND/ABSCESS   Date Value Ref Range Status   05/15/2019 Heavy growth  Final       RADIOLOGY:  MRI FOOT LEFT WO CONTRAST Final Result   1. Suggestion of soft tissue irregularity within the medial and   plantar soft tissues are overlying the great toe and 1st metatarsal.   No discrete collection is identified to suggest an abscess. 2. No confluent hypointense T1 marrow signal within the great toe or   1st metatarsal to suggest acute osteomyelitis. 3. Small 2nd MTP joint effusion with patchy edema within the 2nd   metatarsal head, possibly reactive, although early changes of   osteomyelitis may have a similar appearance. 4. Mild arthrosis at the 1st TMT joint. XR FOOT LEFT (MIN 3 VIEWS)   Final Result   1. There is no appreciable soft tissue foreign body and there are no   plain film findings of osteomyelitis. 2. Minimal soft tissue emphysema is seen along the lateral cortex of   the first metatarsal. The findings are suggestive of a soft tissue   abscess. XR TIBIA FIBULA LEFT (2 VIEWS)   Final Result   1. Unremarkable left tibia and fibula. IR FLUORO GUIDED CVA DEVICE PLMT/REPLACE/REMOVAL    (Results Pending)     Assessment/Plan:   47 y.o. female presented with   Chief Complaint   Patient presents with    Foot Injury     sent in from doctor for infection          Leukocytosis resolved   Pain in left foot    Cellulitis of left foot    Abscess of foot -Enterococcus down to fascia    MRI 1. Suggestion of soft tissue irregularity within the medial and  plantar soft tissues are overlying the great toe and 1st metatarsal.  No discrete collection is identified to suggest an abscess. 2. No confluent hypointense T1 marrow signal within the great toe or  1st metatarsal to suggest acute osteomyelitis. 3. Small 2nd MTP joint effusion with patchy edema within the 2nd  metatarsal head, possibly reactive, although early changes of  osteomyelitis may have a similar appearance. 4. Mild arthrosis at the 1st TMT joint.      -prelim CX gpc Enterococcus faecalis  S/p LEFT PLANTAR FOOT INCISION AND DRAINAGE OF ABSCESS  WOUND

## 2019-05-20 NOTE — PROGRESS NOTES
P Quality Flow/Interdisciplinary Rounds Progress Note        Quality Flow Rounds held on May 20, 2019    Disciplines Attending:  Bedside Nurse, ,  and Nursing Unit Leadership    Donaldo Roach was admitted on 5/15/2019  5:24 PM    Anticipated Discharge Date:  Expected Discharge Date: 05/19/19    Disposition:    Zay Score:  Zay Scale Score: 20    Readmission Risk              Risk of Unplanned Readmission:        9           Discussed patient goal for the day, patient clinical progression, and barriers to discharge.   The following Goal(s) of the Day/Commitment(s) have been identified:  Prompting for DC      [facility]  May 20, 2019

## 2019-05-20 NOTE — PROGRESS NOTES
Department of Podiatry  Resident Progress Note    Subjective  Patient seen bedside for s/p plantar left foot, abscess of foot on 05//17/19 by Dr. Brittany Dickson DPM.  Pt complains of left foot pain. No acute events overnight. Patient denies any N/V/D/F/C/SOB/CP and has no other complaints at this time.            Objective    Past Medical History:   Diagnosis Date    Anxiety     Depression     Foot drop, bilateral     Neutrophilic leukocytosis        Past Surgical History:   Procedure Laterality Date    CYST REMOVAL      FOOT DEBRIDEMENT Left 5/17/2019    LEFT PLANTAR FOOT INCISION AND DRAINAGE OF ABSCESS  WOUND GENTAMICIN GAUZE PACKING (PULSE VAC) performed by Mignon Rasmussen.MARVIN at [facility]         History reviewed. No pertinent family history. No Known Allergies    Vascular: DP and PT pulses 2/4 b/l. CFT <5 secs b/l. Skin temperature warm to cool from proximal to distal.   Neuro: Protective Sensation diminished b/l. Derm: Skin appears supple. Hyperkeratotic tissue noted to the plantar aspect of the 1st MPJ and medial aspect of the hallux of the right foot. Toenails 1-5 b/l are dystrophic. Webspaces 1-4 b/l are C/D/I. Upon removal of the surgical dressing, red beefy granular tissue can be observed. Fascia and tendons are exposed. Probes to bone. Serosanguineous drainage, and decrease in erythema noted. noted. Wound on the medial aspect of hallux on the left foot serosanguineous drainage, no malodor, and decrease in erythema. Plantar wound on the 1st MPJ that has mild serosanguineous drainage, no malodor and decrease in erythema. Mild diffuse swelling to the left foot. Probes to bone, the plantar wound. Musculoskeletal: Muscle Strength 5/5 for all groups b/l Pain to palpation to the surgical site on the plantar aspect of left foot.     Wound Care Documentation:  Wound 05/15/19 Foot Plantar; Inner;Left red, swollen, open ulceration (Active)   Wound Image   5/20/2019 12:42 PM   Wound Traumatic 5/20/2019 12:42 PM   Dressing Status Dry; Intact; Clean 5/19/2019 11:15 PM   Dressing Changed Other (Comment) 5/19/2019  8:00 AM   Dressing/Treatment Dry Dressing; Ace Wrap 5/19/2019 11:15 PM   Wound Length (cm) 7 cm 5/20/2019 12:42 PM   Wound Width (cm) 5 cm 5/20/2019 12:42 PM   Wound Depth (cm) 0.8 cm 5/20/2019 12:42 PM   Wound Surface Area (cm^2) 35 cm^2 5/20/2019 12:42 PM   Change in Wound Size % (l*w) 12.5 5/20/2019 12:42 PM   Wound Volume (cm^3) 28 cm^3 5/20/2019 12:42 PM   Wound Assessment GUILLERMO 5/19/2019 11:15 PM   Drainage Amount Moderate 5/20/2019 12:42 PM   Drainage Description Serosanguinous 5/20/2019 12:42 PM   Odor None 5/20/2019 12:42 PM   Natalie-wound Assessment Red 5/16/2019 12:11 PM   Number of days: 4       Wound 05/15/19 Foot Left (Active)   Wound Traumatic 5/16/2019  8:00 AM   Dressing Status Dry; Intact; Clean 5/19/2019 11:15 PM   Dressing Changed Other (Comment) 5/19/2019  8:00 AM   Dressing/Treatment Dry Dressing 5/19/2019 11:15 PM   Wound Length (cm) 1.5 cm 5/16/2019 12:11 PM   Wound Width (cm) 1.5 cm 5/16/2019 12:11 PM   Wound Surface Area (cm^2) 2.25 cm^2 5/16/2019 12:11 PM   Change in Wound Size % (l*w) 0 5/16/2019 12:11 PM   Wound Assessment GUILLERMO 5/19/2019 11:15 PM   Drainage Amount GUILLERMO 5/19/2019 11:15 PM   Odor None 5/16/2019 11:00 PM   Number of days: 4       Wound 05/15/19 Toe (Comment  which one) Left (Active)   Wound Traumatic 5/16/2019  8:00 AM   Dressing Status Dry; Intact; Clean 5/20/2019  8:00 AM   Dressing Changed Other (Comment) 5/19/2019  8:00 AM   Dressing/Treatment Dry Dressing 5/20/2019  8:00 AM   Wound Length (cm) 1.5 cm 5/16/2019 12:11 PM   Wound Width (cm) 0.5 cm 5/16/2019 12:11 PM   Wound Surface Area (cm^2) 0.75 cm^2 5/16/2019 12:11 PM   Change in Wound Size % (l*w) 0 5/16/2019 12:11 PM   Wound Assessment GUILLERMO 5/19/2019  8:00 AM   Drainage Amount None 5/20/2019  8:00 AM   Odor None 5/20/2019  8:00 AM   Number of days: 4       Wound 05/15/19 Foot Right (Active)   Wound Traumatic 5/16/2019  8:00 AM   Dressing/Treatment Open to air 5/20/2019  8:00 AM   Wound Length (cm) 1.5 cm 5/16/2019 12:11 PM   Wound Width (cm) 1 cm 5/16/2019 12:11 PM   Wound Surface Area (cm^2) 1.5 cm^2 5/16/2019 12:11 PM   Change in Wound Size % (l*w) 0 5/16/2019 12:11 PM   Drainage Amount None 5/20/2019  8:00 AM   Odor None 5/20/2019  8:00 AM   Number of days: 4       Wound 05/15/19 Toe (Comment  which one) Right (Active)   Wound Traumatic 5/16/2019  8:00 AM   Dressing/Treatment Open to air 5/20/2019  8:00 AM   Wound Length (cm) 1.5 cm 5/15/2019 10:15 PM   Wound Width (cm) 1 cm 5/15/2019 10:15 PM   Wound Surface Area (cm^2) 1.5 cm^2 5/15/2019 10:15 PM   Drainage Amount None 5/20/2019  8:00 AM   Odor None 5/20/2019  8:00 AM   Number of days: 4       Wound 05/15/19 Finger (Comment which one) Left (Active)   Dressing Status Clean;Dry; Intact 5/16/2019 11:00 PM   Dressing Changed Changed/New 5/16/2019 11:00 PM   Dressing/Treatment Open to air 5/20/2019  8:00 AM   Wound Length (cm) 1 cm 5/15/2019 10:15 PM   Wound Width (cm) 0.5 cm 5/15/2019 10:15 PM   Wound Surface Area (cm^2) 0.5 cm^2 5/15/2019 10:15 PM   Wound Assessment Dry;Clean;Other (Comment) 5/19/2019 11:15 PM   Drainage Amount None 5/20/2019  8:00 AM   Odor None 5/20/2019  8:00 AM   Number of days: 4       WBC:    Lab Results   Component Value Date    WBC 8.8 05/18/2019     BMP:    Lab Results   Component Value Date     05/20/2019    K 4.0 05/20/2019    K 4.0 05/16/2019     05/20/2019    CO2 29 05/20/2019    BUN 9 05/20/2019    LABALBU 3.0 05/10/2015    CREATININE 0.5 05/20/2019    CALCIUM 9.0 05/20/2019    GFRAA >60 05/20/2019    LABGLOM >60 05/20/2019    GLUCOSE 98 05/20/2019       Scheduled Meds:   docusate sodium  100 mg Oral BID    bisacodyl  5 mg Oral BID    ampicillin IV  2 g Intravenous 6 times per day    sodium chloride flush  10 mL Intravenous 2 times per day    irrigation builder  80 mg Irrigation Daily    folic acid  1 mg Oral Daily    gabapentin  300 mg Oral TID    sertraline  50 mg Oral Nightly    vitamin B-1  100 mg Oral Daily    enoxaparin  40 mg Subcutaneous Daily     Continuous Infusions:  PRN Meds:.sodium chloride flush, oxyCODONE-acetaminophen **OR** oxyCODONE-acetaminophen, fentanNYL, melatonin, magnesium hydroxide, ondansetron, acetaminophen    Diagnostics  XR FOOT LEFT (MIN 3 VIEWS)   Status: Final result   Order Providers      Authorizing Billing   Omero Boyd MD           Signed by      Signed Date/Time   Phone Pager   Alber Grier 5/15/2019 18:36 [phone]     [facility]      Read Date Phone Pager   Alber Grier May 15, 2019 [phone]     Radiation Dose Estimates      No radiation information found for this patient   Narrative   Reading location:  200       HISTORY: Wound along the plantar aspect of the left foot.       TECHNIQUE: 3 views of the left foot were obtained.        FINDINGS: There is no fracture or dislocation of the left foot. Prominent soft tissue is seen along the plantar aspect of the midfoot.       On the frontal view of the foot, there is subcutaneous emphysema seen   along the lateral cortex of the first metatarsal. There is no   periosteal reaction to suggest osteomyelitis.           Impression   1. There is no appreciable soft tissue foreign body and there are no   plain film findings of osteomyelitis.    2. Minimal soft tissue emphysema is seen along the lateral cortex of   the first metatarsal. The findings are suggestive of a soft tissue   abscess.             MRI FOOT LEFT WO CONTRAST   Status: Final result   Order Providers      Authorizing Billing   Tim Romo MD           Signed by      Signed Date/Time   Phone Pager   Nasir Chen 5/16/2019 09:05 [phone]     [facility]      Read Date Phone Pager   Nasir Chen May 16, 2019 [phone]     Orders Requiring a Screening Form      Procedure Order Status Form Status    MRI FOOT LEFT WO CONTRAST Completed Created   Radiation Dose Estimates      No radiation information found for this patient   Narrative   Reading location:  200       Indication: Left foot ulcers, evaluate for osteomyelitis.       Comparison: Left foot radiograph from 5/15/2019.       Technique: Multiplanar, multisequence MR imaging of the left forefoot   was performed without administration of intravenous contrast.       FINDINGS:       There is suggestion of multifocal areas of soft tissue irregularity   within the medial soft tissues overlying the interphalangeal joint of   the great toe, medial soft tissues overlying the mid shaft of the 1st   metatarsal, and within the plantar soft tissues at the level of the   1st metatarsophalangeal joint. There is no discrete loculated   collection to suggest an abscess. There is no confluent hypointense T1   marrow signal within the great toe or 1st metatarsal to suggest acute   osteomyelitis.       There is a small effusion within the 2nd metatarsophalangeal joint   with a patchy area of edema within the 2nd metatarsal head.       There is no evidence of acute fracture. There is mild arthrosis at the   1st tarsometatarsal joint with subchondral reactive change within the   distal and dorsal aspect of the medial cuneiform.       There is a trace effusion within the 1st metatarsophalangeal joint.       The interosseous band of the Lisfranc ligament is grossly intact. There is diffuse increased signal and atrophy of the muscles of the   forefoot, likely related to denervation. The visualized tendons are   grossly intact.           Impression   1. Suggestion of soft tissue irregularity within the medial and   plantar soft tissues are overlying the great toe and 1st metatarsal.   No discrete collection is identified to suggest an abscess. 2. No confluent hypointense T1 marrow signal within the great toe or   1st metatarsal to suggest acute osteomyelitis.    3. Small 2nd MTP joint effusion with patchy edema within the 2nd   metatarsal head, possibly reactive, although early changes of   osteomyelitis may have a similar appearance. 4. Mild arthrosis at the 1st TMT joint. Assessment  1.s/p plantar left foot, abscess of foot on 05/17/19 done by Dr. Brittany Dickson DPM  2. Left foot wound  3. Cellulitis and abscess of foot           Plan  - Patient was examined and evaluated. - Reviewed patient's recent lab results and pertinent diagnostic imaging.  - Reviewed ancillary service notes. - Dressing change per podiatry with gentamycin soak gauze, 4x4, ABD and kerlix. And xeroform to the hallux.   - Per ID-Antibiotics current- ampicillin with PICC line for 6 week  - Elevate limb at all time on the bed  -Patient will be d/c home with home health care  - Discussed patient with Sally Obrien DPM.  - Will continue to follow patient while they are in-house.

## 2019-05-20 NOTE — CARE COORDINATION
SS Note/Discharge plan:  Notified by Michael Guajardo for [facility] that pt's out of pocket cost is $507.90 per week for home infusion Abx and supplies, Abx is $2.50 per week and $505.40 supply cost, pt was informed of cost and that she could make payments but currently does not feel she can afford this, discussed [facility] Kevin as a possible alternative d/c plan however pt very emotional and crying and stating that she is wanting to go home, support offered, referral made to [facility] liaison also per physician to check on their supply cost, sw will follow in am to confirm transtion of care.  Electronically signed by ENA Renee on 5/20/2019 at 3:25 PM

## 2019-05-20 NOTE — PROGRESS NOTES
104 103   CO2 27 26 29   BUN 10 7 9   CREATININE 0.5 0.5 0.5   GLUCOSE 100* 94 98   CALCIUM 8.9 8.8 9.0       Recent Labs     05/18/19  0506   WBC 8.8   RBC 3.61   HGB 11.2*   HCT 33.6*   MCV 93.1   MCH 31.0   MCHC 33.3   RDW 12.2      MPV 9.4         Radiology:   IR FLUORO GUIDED CVA DEVICE PLMT/REPLACE/REMOVAL   Final Result   Successful placement of a 5 [place] double-lumen Power   PICC line using ultrasound guidance via the left basilic  vein. IR ULTRASOUND GUIDANCE VASCULAR ACCESS   Final Result   Ultrasound guidance was provided during placement of a   PICC line. MRI FOOT LEFT WO CONTRAST   Final Result   1. Suggestion of soft tissue irregularity within the medial and   plantar soft tissues are overlying the great toe and 1st metatarsal.   No discrete collection is identified to suggest an abscess. 2. No confluent hypointense T1 marrow signal within the great toe or   1st metatarsal to suggest acute osteomyelitis. 3. Small 2nd MTP joint effusion with patchy edema within the 2nd   metatarsal head, possibly reactive, although early changes of   osteomyelitis may have a similar appearance. 4. Mild arthrosis at the 1st TMT joint. XR FOOT LEFT (MIN 3 VIEWS)   Final Result   1. There is no appreciable soft tissue foreign body and there are no   plain film findings of osteomyelitis. 2. Minimal soft tissue emphysema is seen along the lateral cortex of   the first metatarsal. The findings are suggestive of a soft tissue   abscess. XR TIBIA FIBULA LEFT (2 VIEWS)   Final Result   1. Unremarkable left tibia and fibula. Assessment:  Principal Problem:    Cellulitis and abscess of foot  Active Problems:    Leukocytosis    Risk for falls    Pain in left foot    Cellulitis of left foot    Abscess of foot    Neutrophilic leukocytosis  Resolved Problems:    * No resolved hospital problems. *      Plan:  1.  Enterococcus faecalis cellulitis and abscess of left foot: Patient failed outpatient treatment with Bactrim and Keflex. She used caustic chemicals to clean the wound. ID following. I & D done 5/17/19. Wound culture grew Enterococcus faecalis. Dressing changes per Podiatry. Ampicillin for 6 weeks per ID - PICC placed. 2. Narcotic induced constipation:  Continue Colace. Add Miralax. 3. Charcot foot:  ATF braces  4. Disposition:  Patient would likely benefit from [place], but prefers to return home. Costs of the supplies for home IV antibiotic administration is cost prohibitive. Awaiting to find out if patient can set up payment arrangements with home health. NOTE: This report was transcribed using voice recognition software.  Every effort was made to ensure accuracy; however, inadvertent computerized transcription errors may be present.     Electronically signed by MILLY Carl CNP on 5/20/2019 at 4:04 PM

## 2019-05-20 NOTE — FLOWSHEET NOTE
Inpatient Wound Care  Initial evaluation    Admit Date: 5/15/2019  5:24 PM    Reason for consult:  Left foot post op wound    Significant history:    Past Medical History:   Diagnosis Date    Anxiety     Depression     Foot drop, bilateral     Neutrophilic leukocytosis        Wound history:      Findings:       05/20/19 1242   Wound 05/15/19 Foot Plantar; Inner;Left red, swollen, open ulceration   Date First Assessed/Time First Assessed: 05/15/19 1759   Present on Hospital Admission: Yes  Primary Wound Type: Other (comment)  Location: Foot  Wound Location Orientation: Plantar; Inner;Left  Wound Description (Comments): red, swollen, open ulceration   Wound Image    Wound Traumatic   Wound Length (cm) 7 cm   Wound Width (cm) 5 cm   Wound Depth (cm) 0.8 cm   Wound Surface Area (cm^2) 35 cm^2   Change in Wound Size % (l*w) 12.5   Wound Volume (cm^3) 28 cm^3   Drainage Amount Moderate   Drainage Description Serosanguinous   Odor None     **Informed Consent**    The patient has given verbal consent to have photos taken of left foot and inserted into their chart as part of their permanent medical record for purposes of documentation, treatment management and/or medical review. All Images taken on 5/20/19 of patient name: Robles Albert were transmitted and stored on secured Vtap located within [employer] by a registered [employer] Mobile Application Device.        Impression:      Interventions in place:  packing    Plan:  Cont treatment as per podiatry  Pt to cont follow up with Dr Lazarus Lat 5/20/2019 12:43 PM

## 2019-05-20 NOTE — HOME CARE
DUANE YUN    ORDERED THERAPY AT TIME OF QUOTE: AMPICILLIN 2GM Q4H VIA CADD PUMP  COVERAGE: MPD COPAY FOR DRUG; NO PER RACHAEL COVERAGE  DRUG COST: $2.50/WEEK  PER RACHAEL COST: $505.40/WEEK  TOTAL PT RESPONSIBILITY: $507.90/WEEK    CALL TO LSW BECCA AND UPDATED WITH THE PRICING, CALL OUT TO PATIENT IN HER ROOM AND PHONE RINGS BUSY WILL ATTEMPT CALL BACK IN A FEW MINUTES.      Misty Castaneda LPGHISLAINE Segundo      CALL TO PATIENT AND SHE IS UNABLE TO AFFORD THE COST, SPOKE WITH LSW AND REFERRAL WILL BE CX D/T INABILITY TO PAY     Misty Castaneda, [address] [place], [place] [address]maximilian Segundo

## 2019-05-21 VITALS
RESPIRATION RATE: 16 BRPM | HEIGHT: 72 IN | TEMPERATURE: 98 F | DIASTOLIC BLOOD PRESSURE: 73 MMHG | WEIGHT: 169 LBS | HEART RATE: 77 BPM | SYSTOLIC BLOOD PRESSURE: 142 MMHG | OXYGEN SATURATION: 97 % | BODY MASS INDEX: 22.89 KG/M2

## 2019-05-21 LAB
ANION GAP SERPL CALCULATED.3IONS-SCNC: 8 MMOL/L (ref 7–16)
BUN BLDV-MCNC: 11 MG/DL (ref 6–20)
CALCIUM SERPL-MCNC: 9.2 MG/DL (ref 8.6–10.2)
CHLORIDE BLD-SCNC: 100 MMOL/L (ref 98–107)
CO2: 31 MMOL/L (ref 22–29)
CREAT SERPL-MCNC: 0.5 MG/DL (ref 0.5–1)
GFR AFRICAN AMERICAN: >60
GFR NON-AFRICAN AMERICAN: >60 ML/MIN/1.73
GLUCOSE BLD-MCNC: 96 MG/DL (ref 74–99)
HCT VFR BLD CALC: 33.1 % (ref 34–48)
HEMOGLOBIN: 10.9 G/DL (ref 11.5–15.5)
MCH RBC QN AUTO: 30.4 PG (ref 26–35)
MCHC RBC AUTO-ENTMCNC: 32.9 % (ref 32–34.5)
MCV RBC AUTO: 92.5 FL (ref 80–99.9)
PDW BLD-RTO: 11.9 FL (ref 11.5–15)
PLATELET # BLD: 290 E9/L (ref 130–450)
PMV BLD AUTO: 9.3 FL (ref 7–12)
POTASSIUM SERPL-SCNC: 4 MMOL/L (ref 3.5–5)
RBC # BLD: 3.58 E12/L (ref 3.5–5.5)
SODIUM BLD-SCNC: 139 MMOL/L (ref 132–146)
WBC # BLD: 7.3 E9/L (ref 4.5–11.5)

## 2019-05-21 PROCEDURE — 97116 GAIT TRAINING THERAPY: CPT

## 2019-05-21 PROCEDURE — 6370000000 HC RX 637 (ALT 250 FOR IP): Performed by: STUDENT IN AN ORGANIZED HEALTH CARE EDUCATION/TRAINING PROGRAM

## 2019-05-21 PROCEDURE — 36592 COLLECT BLOOD FROM PICC: CPT

## 2019-05-21 PROCEDURE — 6370000000 HC RX 637 (ALT 250 FOR IP): Performed by: INTERNAL MEDICINE

## 2019-05-21 PROCEDURE — 97530 THERAPEUTIC ACTIVITIES: CPT

## 2019-05-21 PROCEDURE — 6360000002 HC RX W HCPCS: Performed by: PODIATRIST

## 2019-05-21 PROCEDURE — 2580000003 HC RX 258: Performed by: STUDENT IN AN ORGANIZED HEALTH CARE EDUCATION/TRAINING PROGRAM

## 2019-05-21 PROCEDURE — 6360000002 HC RX W HCPCS: Performed by: SPECIALIST

## 2019-05-21 PROCEDURE — 97110 THERAPEUTIC EXERCISES: CPT

## 2019-05-21 PROCEDURE — 85027 COMPLETE CBC AUTOMATED: CPT

## 2019-05-21 PROCEDURE — 99239 HOSP IP/OBS DSCHRG MGMT >30: CPT | Performed by: INTERNAL MEDICINE

## 2019-05-21 PROCEDURE — 6360000002 HC RX W HCPCS: Performed by: STUDENT IN AN ORGANIZED HEALTH CARE EDUCATION/TRAINING PROGRAM

## 2019-05-21 PROCEDURE — 6370000000 HC RX 637 (ALT 250 FOR IP): Performed by: NURSE PRACTITIONER

## 2019-05-21 PROCEDURE — 36415 COLL VENOUS BLD VENIPUNCTURE: CPT

## 2019-05-21 PROCEDURE — 80048 BASIC METABOLIC PNL TOTAL CA: CPT

## 2019-05-21 PROCEDURE — 2580000003 HC RX 258: Performed by: SPECIALIST

## 2019-05-21 RX ORDER — POLYETHYLENE GLYCOL 3350 17 G/17G
17 POWDER, FOR SOLUTION ORAL DAILY
Qty: 527 G | Refills: 1 | DISCHARGE
Start: 2019-05-22 | End: 2019-06-21

## 2019-05-21 RX ORDER — OXYCODONE HYDROCHLORIDE AND ACETAMINOPHEN 5; 325 MG/1; MG/1
1 TABLET ORAL ONCE
Status: COMPLETED | OUTPATIENT
Start: 2019-05-21 | End: 2019-05-21

## 2019-05-21 RX ORDER — PSEUDOEPHEDRINE HCL 30 MG
100 TABLET ORAL 2 TIMES DAILY
DISCHARGE
Start: 2019-05-21

## 2019-05-21 RX ORDER — OXYCODONE HYDROCHLORIDE AND ACETAMINOPHEN 5; 325 MG/1; MG/1
2 TABLET ORAL EVERY 4 HOURS PRN
Qty: 36 TABLET | Refills: 0 | Status: SHIPPED | OUTPATIENT
Start: 2019-05-21 | End: 2019-05-24

## 2019-05-21 RX ORDER — OXYCODONE HYDROCHLORIDE AND ACETAMINOPHEN 5; 325 MG/1; MG/1
2 TABLET ORAL EVERY 4 HOURS PRN
Qty: 36 TABLET | Refills: 0 | DISCHARGE
Start: 2019-05-21 | End: 2019-05-21

## 2019-05-21 RX ORDER — LANOLIN ALCOHOL/MO/W.PET/CERES
3 CREAM (GRAM) TOPICAL NIGHTLY PRN
Refills: 0 | DISCHARGE
Start: 2019-05-21 | End: 2021-12-10 | Stop reason: ALTCHOICE

## 2019-05-21 RX ADMIN — OXYCODONE HYDROCHLORIDE AND ACETAMINOPHEN 2 TABLET: 5; 325 TABLET ORAL at 08:32

## 2019-05-21 RX ADMIN — THIAMINE HCL TAB 100 MG 100 MG: 100 TAB at 08:32

## 2019-05-21 RX ADMIN — FOLIC ACID 1 MG: 1 TABLET ORAL at 08:31

## 2019-05-21 RX ADMIN — AMPICILLIN SODIUM 2 G: 2 INJECTION, POWDER, FOR SOLUTION INTRAVENOUS at 03:47

## 2019-05-21 RX ADMIN — GABAPENTIN 300 MG: 300 CAPSULE ORAL at 13:02

## 2019-05-21 RX ADMIN — OXYCODONE HYDROCHLORIDE AND ACETAMINOPHEN 2 TABLET: 5; 325 TABLET ORAL at 14:44

## 2019-05-21 RX ADMIN — GENTAMICIN SULFATE 80 MG: 80 INJECTION, SOLUTION INTRAVENOUS at 09:48

## 2019-05-21 RX ADMIN — AMPICILLIN SODIUM 2 G: 2 INJECTION, POWDER, FOR SOLUTION INTRAVENOUS at 00:21

## 2019-05-21 RX ADMIN — DOCUSATE SODIUM 100 MG: 100 CAPSULE, LIQUID FILLED ORAL at 08:31

## 2019-05-21 RX ADMIN — AMPICILLIN SODIUM 2 G: 2 INJECTION, POWDER, FOR SOLUTION INTRAVENOUS at 15:50

## 2019-05-21 RX ADMIN — ENOXAPARIN SODIUM 40 MG: 40 INJECTION SUBCUTANEOUS at 08:31

## 2019-05-21 RX ADMIN — GABAPENTIN 300 MG: 300 CAPSULE ORAL at 08:31

## 2019-05-21 RX ADMIN — Medication 10 ML: at 08:31

## 2019-05-21 RX ADMIN — AMPICILLIN SODIUM 2 G: 2 INJECTION, POWDER, FOR SOLUTION INTRAVENOUS at 08:31

## 2019-05-21 RX ADMIN — AMPICILLIN SODIUM 2 G: 2 INJECTION, POWDER, FOR SOLUTION INTRAVENOUS at 12:59

## 2019-05-21 RX ADMIN — OXYCODONE HYDROCHLORIDE AND ACETAMINOPHEN 1 TABLET: 5; 325 TABLET ORAL at 09:48

## 2019-05-21 RX ADMIN — BISACODYL 5 MG: 5 TABLET, COATED ORAL at 08:35

## 2019-05-21 RX ADMIN — POLYETHYLENE GLYCOL 3350 17 G: 17 POWDER, FOR SOLUTION ORAL at 08:31

## 2019-05-21 ASSESSMENT — PAIN SCALES - GENERAL
PAINLEVEL_OUTOF10: 3
PAINLEVEL_OUTOF10: 7
PAINLEVEL_OUTOF10: 0
PAINLEVEL_OUTOF10: 2
PAINLEVEL_OUTOF10: 5
PAINLEVEL_OUTOF10: 7

## 2019-05-21 ASSESSMENT — PAIN DESCRIPTION - PAIN TYPE: TYPE: SURGICAL PAIN

## 2019-05-21 ASSESSMENT — PAIN DESCRIPTION - ORIENTATION
ORIENTATION: LEFT
ORIENTATION: LEFT

## 2019-05-21 ASSESSMENT — PAIN DESCRIPTION - ONSET: ONSET: GRADUAL

## 2019-05-21 ASSESSMENT — PAIN DESCRIPTION - LOCATION
LOCATION: FOOT
LOCATION: FOOT

## 2019-05-21 ASSESSMENT — PAIN DESCRIPTION - FREQUENCY: FREQUENCY: INTERMITTENT

## 2019-05-21 ASSESSMENT — PAIN DESCRIPTION - DESCRIPTORS
DESCRIPTORS: ACHING;DISCOMFORT
DESCRIPTORS: ACHING;DISCOMFORT;SORE

## 2019-05-21 ASSESSMENT — PAIN - FUNCTIONAL ASSESSMENT: PAIN_FUNCTIONAL_ASSESSMENT: PREVENTS OR INTERFERES SOME ACTIVE ACTIVITIES AND ADLS

## 2019-05-21 ASSESSMENT — PAIN DESCRIPTION - PROGRESSION: CLINICAL_PROGRESSION: NOT CHANGED

## 2019-05-21 NOTE — PLAN OF CARE
Problem: Falls - Risk of:  Goal: Will remain free from falls  Description  Will remain free from falls  5/21/2019 1425 by Cindy Matson RN  Outcome: Met This Shift     Problem: Pain:  Goal: Pain level will decrease  Description  Pain level will decrease  Outcome: Met This Shift     Problem: Pain:  Goal: Control of acute pain  Description  Control of acute pain  5/21/2019 1425 by Cindy Matson RN  Outcome: Met This Shift     Problem: Mobility - Impaired:  Goal: Mobility will improve  Description  Mobility will improve  Outcome: Met This Shift     Problem:  Activity:  Goal: Ability to tolerate increased activity will improve  Description  Ability to tolerate increased activity will improve  Outcome: Met This Shift     Problem: Skin Integrity:  Goal: Complications related to intravenous access or infusion will be avoided or minimized  Description  Complications related to intravenous access or infusion will be avoided or minimized  Outcome: Met This Shift

## 2019-05-21 NOTE — CARE COORDINATION
SS NOTE: [facility] quoted a cost for supplies that pt could afford, however when [facility] liaison contacted pt's father Darwin Morris who was going to be pt's caregiver in his home - he insisted that he was not comfortable learning/ providing the woundcare. He is requesting that pt go to [facility]. Pt reluctant to accept and trying to bargain with everyone to go home. Final plan is for pt to go to [facility]. SW awaiting room and time at the [facility] he Liaison. Pt will need a signed [place]. Keysha Darling. 5/21/2019.10:33 AM.

## 2019-05-21 NOTE — DISCHARGE SUMMARY
[facility] Physician Discharge Summary       [facility] Vicente Rodriguez [address] [facility] [address] [address], [address] [place] [address]  [phone]            Activity level: Activity per Podiatry    Diet: DIET GENERAL;    Labs: CBC and BMP on Friday    Condition at discharge: Stable    Dispo:Discharge to [address] [place] [address]    Patient ID:  Marie Gr  [id number]  47 y.o.  1970    Admit date: 5/15/2019    Discharge date and time:  5/21/2019  10:44 AM    Admission Diagnoses: Principal Problem:    Cellulitis and abscess of foot  Active Problems:    Leukocytosis    Risk for falls    Pain in left foot    Cellulitis of left foot    Abscess of foot    Neutrophilic leukocytosis  Resolved Problems:    * No resolved hospital problems. *      Discharge Diagnoses: Principal Problem:    Cellulitis and abscess of foot  Active Problems:    Leukocytosis    Risk for falls    Pain in left foot    Cellulitis of left foot    Abscess of foot    Neutrophilic leukocytosis  Resolved Problems:    * No resolved hospital problems. *      Consults:  IP CONSULT TO PODIATRY  IP CONSULT TO PODIATRY  IP CONSULT TO PODIATRY  IP CONSULT TO DIETITIAN  IP CONSULT TO SOCIAL WORK  IP CONSULT TO INFECTIOUS DISEASES  IP CONSULT TO ANESTHESIOLOGY  IP CONSULT TO SOCIAL WORK  IP CONSULT TO CASE MANAGEMENT  IP CONSULT TO SOCIAL WORK    Procedures: Bedside I & D 5/17/19    Hospital Course: Marie Gr is a 51year old female with a history of anxiety and depression that presented to the Emergency Department with a left foot wound and pain.;  She reported that she had developed a blister while doing yard work. The pain had worsened and she saw her physician and was placed on keflex and bactrim. The pain worsened and she pierced the blister with a needle that she sterilized. She then tried different soaks including epsom salt, vinegar, and bleach. Height:         General Appearance: alert and oriented to person, place and time and in no acute distress  Skin: warm and dry  Head: normocephalic and atraumatic  Eyes: pupils equal, round, and reactive to light, conjunctivae normal  Neck: neck supple and non tender without mass   Pulmonary/Chest: wheezes right upper and lower lobes, no respiratory distress  Cardiovascular: normal rate, normal S1 and S2   Abdomen: soft, non-tender, non-distended, normal bowel sounds  Extremities: dressing to left foot, brace to right leg  Neurologic: no cranial nerve deficit and speech normal  PICC right arm      I/O last 3 completed shifts: In: 1080 [P.O.:1080]  Out: 1950 [Urine:1950]  No intake/output data recorded. LABS:  Recent Labs     05/19/19  0456 05/20/19  0438 05/21/19  0415    141 139   K 4.0 4.0 4.0    103 100   CO2 26 29 31*   BUN 7 9 11   CREATININE 0.5 0.5 0.5   GLUCOSE 94 98 96   CALCIUM 8.8 9.0 9.2       Recent Labs     05/21/19  0415   WBC 7.3   RBC 3.58   HGB 10.9*   HCT 33.1*   MCV 92.5   MCH 30.4   MCHC 32.9   RDW 11.9      MPV 9.3       No results for input(s): POCGLU in the last 72 hours. Imaging:  Xr Tibia Fibula Left (2 Views)    Result Date: 5/15/2019  Reading location:  200 HISTORY: Wound on the left foot. TECHNIQUE: AP and lateral views of the left tibia and fibula were obtained. FINDINGS: There is no fracture of the tibia or fibula. No soft tissue foreign body is noted. 1. Unremarkable left tibia and fibula. Xr Foot Left (min 3 Views)    Result Date: 5/15/2019  Reading location:  200 HISTORY: Wound along the plantar aspect of the left foot. TECHNIQUE: 3 views of the left foot were obtained. FINDINGS: There is no fracture or dislocation of the left foot. Prominent soft tissue is seen along the plantar aspect of the midfoot.  On the frontal view of the foot, there is subcutaneous emphysema seen along the lateral cortex of the first metatarsal. There is no periosteal marrow signal within the great toe or 1st metatarsal to suggest acute osteomyelitis. 3. Small 2nd MTP joint effusion with patchy edema within the 2nd metatarsal head, possibly reactive, although early changes of osteomyelitis may have a similar appearance. 4. Mild arthrosis at the 1st TMT joint. Patient Instructions:   Current Discharge Medication List      START taking these medications    Details   melatonin 3 MG TABS tablet Take 1 tablet by mouth nightly as needed (insomnia)  Refills: 0      docusate sodium (COLACE, DULCOLAX) 100 MG CAPS Take 100 mg by mouth 2 times daily      bisacodyl (DULCOLAX) 5 MG EC tablet Take 1 tablet by mouth 2 times daily as needed for Constipation      polyethylene glycol (GLYCOLAX) packet Take 17 g by mouth daily  Qty: 527 g, Refills: 1      oxyCODONE-acetaminophen (PERCOCET) 5-325 MG per tablet Take 2 tablets by mouth every 4 hours as needed for Pain for up to 3 days. Qty: 36 tablet, Refills: 0    Comments: Reduce doses taken as pain becomes manageable  Associated Diagnoses: Cellulitis and abscess of foot      ampicillin (OMNIPEN) infusion Infuse 2,000 mg intravenously every 4 hours Compound per protocol. Qty: 360 g, Refills: 1         CONTINUE these medications which have NOT CHANGED    Details   sertraline (ZOLOFT) 50 MG tablet Take 50 mg by mouth nightly      gabapentin (NEURONTIN) 300 MG capsule Take 300 mg by mouth 3 times daily.       vitamin B-1 (THIAMINE) 100 MG tablet Take 100 mg by mouth daily      folic acid (FOLVITE) 1 MG tablet Take 1 mg by mouth daily         STOP taking these medications       sulfamethoxazole-trimethoprim (BACTRIM DS;SEPTRA DS) 800-160 MG per tablet Comments:   Reason for Stopping:         cephALEXin (KEFLEX) 500 MG capsule Comments:   Reason for Stopping:         betamethasone dipropionate (DIPROLENE) 0.05 % cream Comments:   Reason for Stopping:                 Note that more than 30 minutes was spent in preparing discharge papers,

## 2019-05-21 NOTE — PROGRESS NOTES
Inpatient wound care note  Dressing changed per physician    Plan  Pt to go to [facility]  inst re importance of follow up with physician

## 2019-05-21 NOTE — PLAN OF CARE
Problem: Falls - Risk of:  Goal: Will remain free from falls  Description  Will remain free from falls  5/21/2019 0235 by Juanita Saldaña RN  Outcome: Met This Shift     Problem: Falls - Risk of:  Goal: Absence of physical injury  Description  Absence of physical injury  5/21/2019 0235 by Juanita Saldaña RN  Outcome: Met This Shift

## 2019-05-21 NOTE — CARE COORDINATION
SS NOTE: Arrangements completed for pt to be transferred to [facility]16 today at 4:30PM via [facility]. Pt has been made aware of this plan. Pt needs a signed [place]. Conchita Darling. 5/21/2019.1:35 PM.

## 2019-05-21 NOTE — PROGRESS NOTES
[facility]  PROGRESS NOTE  Admit Date:  5/15/2019   NAME:  Harvey iRchter  MRN:  [id number]  :  1970  Age:   47 y.o. PCP:   Tre Chatman MD, [address] [facility] Day: Hospital Day: 7    Subjective:   Pt was seen and examined in a face to face encounter for FOOT INFECTION with CC of NONE  Chief Complaint   Patient presents with    Foot Injury     sent in from doctor for infection      HPI:   Feels better  s.p  SURGERY   PAIN WITH LEFT le s/p dressing change better  Picture viewed with podiatry resident  No FAMILY PRESENT  DRESSING VIEWED WITH PODIATRY    ROS:  CONSTITUTIONAL:   No fever, chills, weight loss, loss of appetite  ALLERGIES:    No urticaria, hay fever,    EYES:     No blurry vision, loss of vision,eye pain  ENT:      No hearing loss, sore throat  CARDIOVASCULAR:  No chest pain, palpitations  RESPIRATORY:    No cough, sob  HEME-LYMPH:   No easy bruising, bleeding  GI:     No nausea, vomiting or diarrhea  :     No urinary complaints  NEURO:    No  lightheadedness, dizziness, confusion,   MUSCULOSKELETAL:   muscle aches, pain   SKIN:     No rash or itch  PSYCH:    No depression or anxiety      Scheduled Meds:   polyethylene glycol  17 g Oral Daily    docusate sodium  100 mg Oral BID    ampicillin IV  2 g Intravenous 6 times per day    sodium chloride flush  10 mL Intravenous 2 times per day    irrigation builder  80 mg Irrigation Daily    folic acid  1 mg Oral Daily    gabapentin  300 mg Oral TID    sertraline  50 mg Oral Nightly    vitamin B-1  100 mg Oral Daily    enoxaparin  40 mg Subcutaneous Daily     Continuous Infusions:    PRN Meds:bisacodyl, sodium chloride flush, oxyCODONE-acetaminophen **OR** oxyCODONE-acetaminophen, fentanNYL, melatonin, magnesium hydroxide, ondansetron, acetaminophen    ALLERGIES: Patient has no known allergies. Objective:   Vitals reviewed.   Vitals:    19 2000 19 2254 19 0000 19 0831   BP: 130/72   (!) 142/73   Pulse: 84   80   Resp: 18   16   Temp: 98.7 °F (37.1 °C)   98 °F (36.7 °C)   TempSrc: Oral      SpO2:  97% 98% 97%   Weight:       Height:         Physical Exam  Constitutional: The patient is awake, alert, and oriented. Skin: Warm and dry. No rashes were noted. HEENT: Eyes show round, and reactive pupils. Neck: Supple to movements. Chest: No use of accessory muscles to breathe. Cardiovascular: S1 and S2 are rhythmic and regular. Abdomen: Positive bowel sounds to auscultation. Benign to palpation. Extremities: No clubbing, no cyanosis, no edema. FOOT DRESSING WAS REMOVED-PLANTAR WOUND FASCIA/PINK   CNS: awake and alert  Psych:  pleasant  Lines: peripheral    I & O - 24hr:    Intake/Output Summary (Last 24 hours) at 5/21/2019 1021  Last data filed at 5/21/2019 0520  Gross per 24 hour   Intake 720 ml   Output 1950 ml   Net -1230 ml     No intake/output data recorded. Weight change:   LABS:    Recent Labs     05/21/19  0415   WBC 7.3   HGB 10.9*   HCT 33.1*   MCV 92.5        Recent Labs     05/19/19  0456 05/20/19  0438 05/21/19  0415    141 139   K 4.0 4.0 4.0    103 100   CO2 26 29 31*   BUN 7 9 11   CREATININE 0.5 0.5 0.5   GFRAA >60 >60 >60   LABGLOM >60 >60 >60   GLUCOSE 94 98 96   CALCIUM 8.8 9.0 9.2      No results for input(s): PROCAL in the last 72 hours. Lab Results   Component Value Date    SEDRATE 63 (H) 05/15/2019     Lab Results   Component Value Date    CRP 9.1 (H) 05/15/2019       No results for input(s): VANCOTROUGH in the last 72 hours. MICROBIOLOGY:   No results for input(s): BC in the last 72 hours. No results for input(s): Deborrah Cuna in the last 72 hours.   Lab Results   Component Value Date    BLOODCULT2 5 Days- no growth 05/15/2019       Organism   Date Value Ref Range Status   05/17/2019 Enterococcus faecalis (A)  Final     WOUND/ABSCESS   Date Value Ref Range Status   05/15/2019 Heavy growth  Final       RADIOLOGY:  IR FLUORO GUIDED CVA DEVICE PLMT/REPLACE/REMOVAL   Final Result   Successful placement of a 5 [place] double-lumen Power   PICC line using ultrasound guidance via the left basilic  vein. IR ULTRASOUND GUIDANCE VASCULAR ACCESS   Final Result   Ultrasound guidance was provided during placement of a   PICC line. MRI FOOT LEFT WO CONTRAST   Final Result   1. Suggestion of soft tissue irregularity within the medial and   plantar soft tissues are overlying the great toe and 1st metatarsal.   No discrete collection is identified to suggest an abscess. 2. No confluent hypointense T1 marrow signal within the great toe or   1st metatarsal to suggest acute osteomyelitis. 3. Small 2nd MTP joint effusion with patchy edema within the 2nd   metatarsal head, possibly reactive, although early changes of   osteomyelitis may have a similar appearance. 4. Mild arthrosis at the 1st TMT joint. XR FOOT LEFT (MIN 3 VIEWS)   Final Result   1. There is no appreciable soft tissue foreign body and there are no   plain film findings of osteomyelitis. 2. Minimal soft tissue emphysema is seen along the lateral cortex of   the first metatarsal. The findings are suggestive of a soft tissue   abscess. XR TIBIA FIBULA LEFT (2 VIEWS)   Final Result   1. Unremarkable left tibia and fibula. Assessment/Plan:   47 y.o. female presented with   Chief Complaint   Patient presents with    Foot Injury     sent in from doctor for infection          Leukocytosis resolved   Pain in left foot    Cellulitis of left foot    Abscess of foot -Enterococcus down to fascia    MRI 1. Suggestion of soft tissue irregularity within the medial and  plantar soft tissues are overlying the great toe and 1st metatarsal.  No discrete collection is identified to suggest an abscess. 2. No confluent hypointense T1 marrow signal within the great toe or  1st metatarsal to suggest acute osteomyelitis.   3. Small 2nd MTP joint effusion with patchy edema within the 2nd  metatarsal head, possibly reactive, although early changes of  osteomyelitis may have a similar appearance. 4. Mild arthrosis at the 1st TMT joint. -prelim CX gpc Enterococcus faecalis  S/p LEFT PLANTAR FOOT INCISION AND DRAINAGE OF ABSCESS  WOUND GENTAMICIN GAUZE PACKING (PULSE VAC)    Cont  Ampicillin plan 6 weeks  picc  D/c planning TO [facility]  CONSIDER WOUND VACC SPOKE WITH PODIATRY    Imaging and labs were reviewed per medical records and any ID pertinent labs were addressed with the patient. The patient was educated about the diagnosis, prognosis, indications, risks and benefits of treatment. The patient is in agreement with the proposed medical treatment plan. An opportunity to ask questions was given to the patient and questions were answered.             Electronically signed by Carmela Spaulding MD on 5/21/2019 at 10:21 AM

## 2019-05-21 NOTE — PROGRESS NOTES
Physical Therapy  Physical Therapy  Daily Treatment Note  5/21/2019  0340/0340-01                      Deepak Buckley   [id number]                              1970    Patient Active Problem List   Diagnosis    Leukocytosis    Risk for falls    Cellulitis and abscess of foot    Pain in left foot    Cellulitis of left foot    Abscess of foot    Neutrophilic leukocytosis       Recommendation for discharge: LTAC  Equipment prescriptions needed:to be determined    [facility]   How much difficulty turning over in bed?: None  How much difficulty sitting down on / standing up from a chair with arms?: A Little  How much difficulty moving from lying on back to sitting on side of bed?: None  How much help from another person moving to and from a bed to a chair?: A Little  How much help from another person needed to walk in hospital room?: A Little  How much help from another person for climbing 3-5 steps with a railing?: A Lot  AM-PAC Inpatient Mobility Raw Score : 19  AM-PAC Inpatient T-Scale Score : 45.44  Mobility Inpatient CMS 0-100% Score: 41.77  Mobility Inpatient CMS G-Code Modifier : CK      Precautions: falls, LLE : NWB, impulsive    S: Patient cleared by nursing for treatment. Patient is agreeable to treatment. Pt c/o pain with L foot. Pain status: (measured on a visual analog scale with 0=no pain and 10=excruciating pain) no number given/10. O: Pt was instructed in and performed the following:   Bed Mobility- Supine to sit-Independent     Scooting- Independent     Sit to supine-NA   Transfers-sit to stand- Independent     Gait:  Patient ambulated 40 feet x 2 using Robbie Michelle with Supervision. Comments: V/C for decreased speed, pacing, NWB on LLE, and safety. Steps: NA  Treatment: pt transferred to eob, sat for 1 minute, stood, ambulated in the hallway and back to eob. Pt performed seated ex's: long arc quad, marching, hip abduction, hip adduction,  x 20 reps.    Comment: V/C for

## 2019-05-21 NOTE — PROGRESS NOTES
Width (cm) 1 cm 5/16/2019 12:11 PM   Wound Surface Area (cm^2) 1.5 cm^2 5/16/2019 12:11 PM   Change in Wound Size % (l*w) 0 5/16/2019 12:11 PM   Drainage Amount None 5/21/2019  8:00 AM   Odor None 5/21/2019  8:00 AM   Number of days: 5       Wound 05/15/19 Toe (Comment  which one) Right (Active)   Wound Traumatic 5/16/2019  8:00 AM   Dressing/Treatment Open to air 5/21/2019  8:00 AM   Wound Length (cm) 1.5 cm 5/15/2019 10:15 PM   Wound Width (cm) 1 cm 5/15/2019 10:15 PM   Wound Surface Area (cm^2) 1.5 cm^2 5/15/2019 10:15 PM   Drainage Amount None 5/21/2019  8:00 AM   Odor None 5/21/2019  8:00 AM   Number of days: 5       Wound 05/15/19 Finger (Comment which one) Left (Active)   Dressing Status Clean;Dry; Intact 5/16/2019 11:00 PM   Dressing Changed Changed/New 5/16/2019 11:00 PM   Dressing/Treatment Open to air 5/21/2019  8:00 AM   Wound Length (cm) 1 cm 5/15/2019 10:15 PM   Wound Width (cm) 0.5 cm 5/15/2019 10:15 PM   Wound Surface Area (cm^2) 0.5 cm^2 5/15/2019 10:15 PM   Wound Assessment Dry;Clean;Other (Comment) 5/19/2019 11:15 PM   Drainage Amount None 5/21/2019  8:00 AM   Odor None 5/21/2019  8:00 AM   Number of days: 5         CBC with Differential:    Lab Results   Component Value Date    WBC 7.3 05/21/2019    RBC 3.58 05/21/2019    HGB 10.9 05/21/2019    HCT 33.1 05/21/2019     05/21/2019    MCV 92.5 05/21/2019    MCH 30.4 05/21/2019    MCHC 32.9 05/21/2019    RDW 11.9 05/21/2019    LYMPHOPCT 22.4 05/18/2019    MONOPCT 10.8 05/18/2019    BASOPCT 0.9 05/18/2019    MONOSABS 0.95 05/18/2019    LYMPHSABS 1.98 05/18/2019    EOSABS 0.38 05/18/2019    BASOSABS 0.08 05/18/2019     BMP:    Lab Results   Component Value Date     05/21/2019    K 4.0 05/21/2019    K 4.0 05/16/2019     05/21/2019    CO2 31 05/21/2019    BUN 11 05/21/2019    LABALBU 3.0 05/10/2015    CREATININE 0.5 05/21/2019    CALCIUM 9.2 05/21/2019    GFRAA >60 05/21/2019    LABGLOM >60 05/21/2019    GLUCOSE 96 05/21/2019 Gale Vigil MD           Signed by      Signed Date/Time   Phone Pager   Raineida Roberto Carlos 5/16/2019 09:05 [phone]     [facility]      Read Date Phone Pager   Maria Del Rosario Azevedo May 16, 2019 [phone]     Orders Requiring a Screening Form      Procedure Order Status Form Status    MRI FOOT LEFT WO CONTRAST Completed Created   Radiation Dose Estimates      No radiation information found for this patient   Narrative   Reading location:  200       Indication: Left foot ulcers, evaluate for osteomyelitis.       Comparison: Left foot radiograph from 5/15/2019.       Technique: Multiplanar, multisequence MR imaging of the left forefoot   was performed without administration of intravenous contrast.       FINDINGS:       There is suggestion of multifocal areas of soft tissue irregularity   within the medial soft tissues overlying the interphalangeal joint of   the great toe, medial soft tissues overlying the mid shaft of the 1st   metatarsal, and within the plantar soft tissues at the level of the   1st metatarsophalangeal joint. There is no discrete loculated   collection to suggest an abscess. There is no confluent hypointense T1   marrow signal within the great toe or 1st metatarsal to suggest acute   osteomyelitis.       There is a small effusion within the 2nd metatarsophalangeal joint   with a patchy area of edema within the 2nd metatarsal head.       There is no evidence of acute fracture. There is mild arthrosis at the   1st tarsometatarsal joint with subchondral reactive change within the   distal and dorsal aspect of the medial cuneiform.       There is a trace effusion within the 1st metatarsophalangeal joint.       The interosseous band of the Lisfranc ligament is grossly intact. There is diffuse increased signal and atrophy of the muscles of the   forefoot, likely related to denervation. The visualized tendons are   grossly intact.           Impression   1.  Suggestion of soft tissue irregularity within the medial and   plantar soft tissues are overlying the great toe and 1st metatarsal.   No discrete collection is identified to suggest an abscess. 2. No confluent hypointense T1 marrow signal within the great toe or   1st metatarsal to suggest acute osteomyelitis. 3. Small 2nd MTP joint effusion with patchy edema within the 2nd   metatarsal head, possibly reactive, although early changes of   osteomyelitis may have a similar appearance. 4. Mild arthrosis at the 1st TMT joint. 5/19/2019  7:10 AM - Enoch, y Incoming Results From [facility]     Specimen Information: Foot; Swab        Component Collected Lab   Culture [email or web address] [address] Lab   Gram Stain Result 05/17/2019  1:21 PM [facility] 75 - [address] Lab   Refer to ordered Gram stain for results    Organism Abnormal  05/17/2019  1:21 PM [facility] 75  [address] Lab   Enterococcus faecalis    Culture Surgical 05/17/2019  1:21 PM [facility] 16 - [address] Lab   Heavy growth    Testing Performed By     Lab - Abbreviation Name Director Address Valid Date Range   59-FS - [address] 1934 Julius Ayala MD [address] [place] [address]. [place] [address] 08/30/17 0821-Present   Narrative   Performed by: [address] [place] [address] Lab   Source: FOOT       Site: plantar left foot             Susceptibility           Assessment  1.s/p plantar left foot, abscess of foot on 05/17/19 done by Dr. Diana Watt DPM  2. Left foot wound  3. Cellulitis and abscess of foot           Plan  - Patient was examined and evaluated. - Reviewed patient's recent lab results and pertinent diagnostic imaging.  - Reviewed ancillary service notes.   - Dressing change per podiatry with gentamycin soak gauze, 4x4, ABD and kerlix and betadine, xeroform to the hallux.   - Surgical culture showed Enterococcus faecalis  - Per ID-Antibiotics current- ampicillin with PICC line for 6 week  - Elevate limb at all time on the bed  - Possible wound vac application  -Patient will be d[facility] to [facility]  - Discussed patient with Surjit Kendall DPM.  - Will continue to follow patient while they are in-house.

## 2019-05-21 NOTE — DISCHARGE INSTR - COC
Continuity of Care Form    Patient Name: Nancy Puentes   :  1970  MRN:  [id number]    Admit date:  5/15/2019  Discharge date:  2019    Code Status Order: Full Code   Advance Directives:   Advance Care Flowsheet Documentation     Date/Time Healthcare Directive Type of Healthcare Directive Copy in [address] Agent's Name Healthcare Agent's Phone Number    19 1121  No, patient does not have an advance directive for healthcare treatment -- -- -- -- --    05/15/19 2034  No, patient does not have an advance directive for healthcare treatment -- -- -- -- --          Admitting Physician:  Abhi Mcconnell MD  PCP: Raymond Hernandez    Discharging Nurse: [facility] Unit/Room#: 8525/8078-95  Discharging Unit Phone Number: [phone]    Emergency Contact:   Extended Emergency Contact Information  Primary Emergency Contact: [facility] Phone: [phone]  Relation: Parent  Secondary Emergency Contact: marbella farias  Mobile Phone: [phone]  Relation: Child   needed? No    Past Surgical History:  Past Surgical History:   Procedure Laterality Date    CYST REMOVAL      FOOT DEBRIDEMENT Left 2019    LEFT PLANTAR FOOT INCISION AND DRAINAGE OF ABSCESS  WOUND GENTAMICIN GAUZE PACKING (PULSE VAC) performed by Gonsalo Sequeira DPM at [address] [place] [address]         Immunization History: There is no immunization history on file for this patient. Active Problems:  Patient Active Problem List   Diagnosis Code    Leukocytosis D72.829    Risk for falls Z91.81    Cellulitis and abscess of foot L03.119, L02.619    Pain in left foot M79.672    Cellulitis of left foot L03. 116    Abscess of foot J65.422    Neutrophilic leukocytosis I57.4       Isolation/Infection:   Isolation          No Isolation            Nurse Assessment:  Last Vital Signs: BP (!) 142/73   Pulse 80   Temp 98 °F (36.7 °C)   Resp 16 Ht 6' (1.829 m)   Wt 169 lb (76.7 kg)   SpO2 97%   BMI 22.92 kg/m²     Last documented pain score (0-10 scale): Pain Level: 3  Last Weight:   Wt Readings from Last 1 Encounters:   05/15/19 169 lb (76.7 kg)     Mental Status:  oriented, alert, coherent, logical, thought processes intact and able to concentrate and follow conversation    IV Access:  - PICC - site  L Basilic, insertion date: 5/20/2019    Nursing Mobility/ADLs:  Walking   Assisted  Transfer  Independent  Bathing  [id number] [place] [address] Delivery   whole    Wound Care Documentation and Therapy:  Wound 05/15/19 Foot Plantar; Inner;Left red, swollen, open ulceration (Active)   Wound Image   5/20/2019 12:42 PM   Wound Traumatic 5/20/2019 12:42 PM   Dressing Status Dry; Intact; Clean 5/19/2019 11:15 PM   Dressing Changed Other (Comment) 5/19/2019  8:00 AM   Dressing/Treatment Dry Dressing; Ace Wrap 5/19/2019 11:15 PM   Wound Length (cm) 7 cm 5/20/2019 12:42 PM   Wound Width (cm) 5 cm 5/20/2019 12:42 PM   Wound Depth (cm) 0.8 cm 5/20/2019 12:42 PM   Wound Surface Area (cm^2) 35 cm^2 5/20/2019 12:42 PM   Change in Wound Size % (l*w) 12.5 5/20/2019 12:42 PM   Wound Volume (cm^3) 28 cm^3 5/20/2019 12:42 PM   Wound Assessment GUILLERMO 5/19/2019 11:15 PM   Drainage Amount Moderate 5/20/2019 12:42 PM   Drainage Description Serosanguinous 5/20/2019 12:42 PM   Odor None 5/20/2019 12:42 PM   Natalie-wound Assessment Red 5/16/2019 12:11 PM   Number of days: 5       Wound 05/15/19 Foot Left (Active)   Wound Traumatic 5/16/2019  8:00 AM   Dressing Status Dry; Intact; Clean 5/19/2019 11:15 PM   Dressing Changed Other (Comment) 5/19/2019  8:00 AM   Dressing/Treatment Dry Dressing 5/19/2019 11:15 PM   Wound Length (cm) 1.5 cm 5/16/2019 12:11 PM   Wound Width (cm) 1.5 cm 5/16/2019 12:11 PM   Wound Surface Area (cm^2) 2.25 cm^2 5/16/2019 12:11 PM   Change in Wound Size % (l*w) 0 5/16/2019 12:11 PM Wound Assessment GUILLERMO 5/19/2019 11:15 PM   Drainage Amount GUILLERMO 5/19/2019 11:15 PM   Odor None 5/16/2019 11:00 PM   Number of days: 5       Wound 05/15/19 Toe (Comment  which one) Left (Active)   Wound Traumatic 5/16/2019  8:00 AM   Dressing Status Dry; Intact; Clean 5/21/2019  8:00 AM   Dressing Changed Other (Comment) 5/19/2019  8:00 AM   Dressing/Treatment Dry Dressing 5/21/2019  8:00 AM   Wound Length (cm) 1.5 cm 5/16/2019 12:11 PM   Wound Width (cm) 0.5 cm 5/16/2019 12:11 PM   Wound Surface Area (cm^2) 0.75 cm^2 5/16/2019 12:11 PM   Change in Wound Size % (l*w) 0 5/16/2019 12:11 PM   Wound Assessment GUILLERMO 5/19/2019  8:00 AM   Drainage Amount None 5/21/2019  8:00 AM   Odor None 5/21/2019  8:00 AM   Number of days: 5       Wound 05/15/19 Foot Right (Active)   Wound Traumatic 5/16/2019  8:00 AM   Dressing/Treatment Open to air 5/21/2019  8:00 AM   Wound Length (cm) 1.5 cm 5/16/2019 12:11 PM   Wound Width (cm) 1 cm 5/16/2019 12:11 PM   Wound Surface Area (cm^2) 1.5 cm^2 5/16/2019 12:11 PM   Change in Wound Size % (l*w) 0 5/16/2019 12:11 PM   Drainage Amount None 5/21/2019  8:00 AM   Odor None 5/21/2019  8:00 AM   Number of days: 5       Wound 05/15/19 Toe (Comment  which one) Right (Active)   Wound Traumatic 5/16/2019  8:00 AM   Dressing/Treatment Open to air 5/21/2019  8:00 AM   Wound Length (cm) 1.5 cm 5/15/2019 10:15 PM   Wound Width (cm) 1 cm 5/15/2019 10:15 PM   Wound Surface Area (cm^2) 1.5 cm^2 5/15/2019 10:15 PM   Drainage Amount None 5/21/2019  8:00 AM   Odor None 5/21/2019  8:00 AM   Number of days: 5       Wound 05/15/19 Finger (Comment which one) Left (Active)   Dressing Status Clean;Dry; Intact 5/16/2019 11:00 PM   Dressing Changed Changed/New 5/16/2019 11:00 PM   Dressing/Treatment Open to air 5/21/2019  8:00 AM   Wound Length (cm) 1 cm 5/15/2019 10:15 PM   Wound Width (cm) 0.5 cm 5/15/2019 10:15 PM   Wound Surface Area (cm^2) 0.5 cm^2 5/15/2019 10:15 PM   Wound Assessment Dry;Clean;Other (Comment) 5/19/2019 11:15 PM   Drainage Amount None 5/21/2019  8:00 AM   Odor None 5/21/2019  8:00 AM   Number of days: 5        Elimination:  Continence:   · Bowel: Yes  · Bladder: Yes  Urinary Catheter: None   Colostomy/Ileostomy/Ileal Conduit: No       Date of Last BM:     Intake/Output Summary (Last 24 hours) at 5/21/2019 1116  Last data filed at 5/21/2019 0520  Gross per 24 hour   Intake 720 ml   Output 1950 ml   Net -1230 ml     I/O last 3 completed shifts: In: 1080 [P.O.:1080]  Out: 1950 [Urine:1950]    Safety Concerns: At Risk for Falls    Impairments/Disabilities:      None    Nutrition Therapy:  Current Nutrition Therapy:   - Oral Diet:  General    Routes of Feeding: Oral  Liquids: No Restrictions  Daily Fluid Restriction: no  Last Modified Barium Swallow with Video (Video Swallowing Test): not done    Treatments at the Time of Hospital Discharge:   Respiratory Treatments:   Oxygen Therapy:  is not on home oxygen therapy.   Ventilator:    - No ventilator support    Rehab Therapies: Physical Therapy and Occupational Therapy  Weight Bearing Status/Restrictions: Non-weight bearing on left leg  Other Medical Equipment (for information only, NOT a DME order):  crutches  Other Treatments:     Patient's personal belongings (please select all that are sent with patient):  None    RN SIGNATURE:  Electronically signed by Vivian Montes De Oca RN on 5/21/19 at 11:20 AM    CASE MANAGEMENT/SOCIAL WORK SECTION    Inpatient Status Date: ***    Readmission Risk Assessment Score:  Readmission Risk              Risk of Unplanned Readmission:        9           Discharging to Facility/ Agency   · Name:   · Address:  · Phone:  · Fax:    Dialysis Facility (if applicable)   · Name:  · Address:  · Dialysis Schedule:  · Phone:  · Fax:    / signature: {Esignature:[id number]}    PHYSICIAN SECTION    Prognosis: {Prognosis:9516559279}    Condition at Discharge: [facility] Patient Condition:645814774}    Rehab Potential (if transferring to Rehab): {Prognosis:3414269736}    Recommended Labs or Other Treatments After Discharge: ***    Physician Certification: I certify the above information and transfer of Dakota Cabrera  is necessary for the continuing treatment of the diagnosis listed and that she requires {Admit to Appropriate Level of Care:57747} for {GREATER/LESS:434400380} 30 days.      Update Admission H&P: {CHP DME Changes in UYEYQ:985807092}    PHYSICIAN SIGNATURE:  {Esignature:628429847}

## 2019-05-21 NOTE — PROGRESS NOTES
Occupational Therapy  O.T. Bedside Treatment Note    Date:2019  Patient Name: Dakota Cabrera  MRN: [id number]  : 1970  ROOM #: [id number]/0340-01    Problem list / Diagnosis:   Patient Active Problem List   Diagnosis    Leukocytosis    Risk for falls    Cellulitis and abscess of foot    Pain in left foot    Cellulitis of left foot    Abscess of foot    Neutrophilic leukocytosis     Past Medical History:   Past Medical History:   Diagnosis Date    Anxiety     Depression     Foot drop, bilateral     Neutrophilic leukocytosis      Onset/Medical history: medical history reviewed  Past medical history: reviewed    The admitting diagnosis and active problem list as listed above have been reviewed prior to treatment. Nursing cleared the patient for treatment. Patient is agreeable to treatment. Discharge recommendations: LTAC  Equipment prescriptions needed:   TBD    AM - PAC Daily Activity Inpatient   AM-PAC Daily Activity Inpatient   How much help for putting on and taking off regular lower body clothing?: A Lot  How much help for Bathing?: A Little  How much help for Toileting?: A Lot  How much help for putting on and taking off regular upper body clothing?: A Little  How much help for taking care of personal grooming?: A Little  How much help for eating meals?: None  AM-PAC Inpatient Daily Activity Raw Score: 17    Precautions: Falls, impulsive, short term memory deficits, B foot drop with AFOs, NWB LLE (updated 3:39 PM)     S:  - Pt cleared for treatment through nursing.  - Pain: pt c/o  pain in  L foot, however pt did not rate pain. nsg aware. - Pt's father present, however stepped OOR during session.  - Pt pleasant and cooperative during session, however became labile when she mentioned that physician talked with her about possibly losing her foot.     O:    - BUE strengthening exercises: 15-20 reps in all planes of movement with mod resist theraband to increase/maintain strength required for functional transfers/ADL participation. Exercises completed in shoulder and elbow flexion/extension, internal/external rotation and abduction/adduction. Min rest breaks provided 2* to decreased endurance/tolerance. Ex's completed when pt long seated in bed. HEP provided. Functional Assessment:    Initial Eval Status  Date: 5/17/19 Treatment Status  Date: 5/21/19 Short Term Goals  Treatment frequency: PRN  1-3x/week   Feeding Independent  N/T -Per last report    Independent    Grooming Minimal Assist for safety at sink level  N/T Independent    UB Dressing Supervision  Pt had donned shirt prior to session Independent    LB Dressing Moderate Assist for AFO's and safety when managing clothing over hips Pt had shorts donned prior to session; pt able to don/doff R shoe and AFO when long seated in bed at supervision; continue to assess for donning/doffing pants Independent    Bathing Moderate Assist for safety and managing wound dressing N/T - Per last report    Modified Meagher    Toileting Minimal Assist for safety when managing clothing over hips N/T -Per last report    Independent    Bed Mobility  Supine to sit: Supervision   Sit to supine: not assessed, pt up in chair at end of eval  Supervision for supine to sit; supervision for scooting; Supervision for sit to supine; LLE elevated on RTB  Supine to sit:  Independent   Sit to supine: Independent    Functional Transfers Minimal Assist for safety Min A for sit to stand transfers to/from EOB with use of walker  Modified Meagher    Functional Mobility Minimal Assist with wheeled walker for safety, approx 15 ft x 2 to bathroom, Pt impulsive 40 ft x 2 with Min A with FWW; cuing to decrease pace for safety  Modified Meagher with device   Balance Sitting:     Static:  good    Dynamic:fair+  Standing: poor, NWB LLE Sitting:     Static:  good    Dynamic:fair+  Standing: fair, NWB : LLE     Activity Tolerance fair fair  good   Visual/  Perceptual Glasses: yes for driving, pt doesn't currently drive                        A:  -Balance: Sitting: fair       Standing: fair  with Minimal Assist  with  FWW  -Endurance: fair   -Edema: yes - L LE; nsg aware ; positioning provided  -Safety: fair (VC's for walker safety, sequencing, hand placement,NWB on LLE ; decrease pacing d/t safety)  - Pt/family education/training: bed mobility, transfer training, functional mobility,  LE dressing, LE dressing technique,   hand placement, walker safety,  BUE strengthening ex's,  ECT's  -Pt would benefit from additional ADLs, safety education, balance activities, transfer training, strength exercises, and over all endurance building.     P:  - Plan of care: Patient will be seen by OT 1-3x/wk PRN for therapeutic activity, ADL re-training, bed mobility,functional transfers, functional mobility, safety and fall prevention, balance and endurance activities, instruction and training in energy conservation principles, and   patient and family education.   - Patient and/or family understands diagnosis, prognosis and plan of care: yes   - ADL retraining, bathroom safety, DME    Treatment minutes:25    Landon Mcelroy, [address]

## 2021-11-24 ENCOUNTER — ANESTHESIA EVENT (OUTPATIENT)
Dept: OPERATING ROOM | Age: 51
End: 2021-11-24
Payer: COMMERCIAL

## 2021-12-02 ASSESSMENT — LIFESTYLE VARIABLES: SMOKING_STATUS: 1

## 2021-12-02 NOTE — ANESTHESIA PRE PROCEDURE
Department of Anesthesiology  Preprocedure Note       Name:  Selwyn Lockett   Age:  45 y.o.  :  1970                                          MRN:  [id number]         Date:  2021      Surgeon: Sherita Encinas):  Aubrie Cook DPM    Procedure: Procedure(s):  EXCISION OF SKIN LESIONS (X2) LEFT FOOT WITH APPLICATION OF A PLIGRAFT SKIN GRAFT (CPT 76258, 82464) ([address] TO PERFORM SPLIT THICKNESS GRAFT)    Medications prior to admission:   Prior to Admission medications    Medication Sig Start Date End Date Taking? Authorizing Provider   IBUPROFEN IB PO Take by mouth as needed   Yes Historical Provider, MD   melatonin 3 MG TABS tablet Take 1 tablet by mouth nightly as needed (insomnia) 19  Yes MILLY Vogt CNP   sertraline (ZOLOFT) 50 MG tablet Take 50 mg by mouth nightly   Yes Historical Provider, MD   gabapentin (NEURONTIN) 300 MG capsule Take 300 mg by mouth 3 times daily. Yes Historical Provider, MD   docusate sodium (COLACE, DULCOLAX) 100 MG CAPS Take 100 mg by mouth 2 times daily 19   MILLY Vogt CNP   bisacodyl (DULCOLAX) 5 MG EC tablet Take 1 tablet by mouth 2 times daily as needed for Constipation 19   MILLY Vogt CNP   vitamin B-1 (THIAMINE) 100 MG tablet Take 100 mg by mouth daily    Historical Provider, MD       Current medications:    No current facility-administered medications for this encounter. Current Outpatient Medications   Medication Sig Dispense Refill    IBUPROFEN IB PO Take by mouth as needed      melatonin 3 MG TABS tablet Take 1 tablet by mouth nightly as needed (insomnia)  0    sertraline (ZOLOFT) 50 MG tablet Take 50 mg by mouth nightly      gabapentin (NEURONTIN) 300 MG capsule Take 300 mg by mouth 3 times daily.       docusate sodium (COLACE, DULCOLAX) 100 MG CAPS Take 100 mg by mouth 2 times daily      bisacodyl (DULCOLAX) 5 MG EC tablet Take 1 tablet by mouth 2 times daily as needed for Constipation      vitamin B-1 (THIAMINE) 100 MG tablet Take 100 mg by mouth daily         Allergies:  No Known Allergies    Problem List:    Patient Active Problem List   Diagnosis Code    Leukocytosis D72.829    Risk for falls Z91.81    Cellulitis and abscess of foot L03.119, L02.619    Pain in left foot M79.672    Cellulitis of left foot L03. 116    Abscess of foot N55.934    Neutrophilic leukocytosis Z36.1       Past Medical History:        Diagnosis Date    Anxiety     Depression     Foot drop, bilateral     Neutrophilic leukocytosis     Patient unaware of this diagnosis. Past Surgical History:        Procedure Laterality Date    ANKLE SURGERY      plates and 5 screws    CYST REMOVAL      FOOT DEBRIDEMENT Left 5/17/2019    LEFT PLANTAR FOOT INCISION AND DRAINAGE OF ABSCESS  WOUND GENTAMICIN GAUZE PACKING (PULSE VAC) performed by Jhon Mac DPM at [address] [place] [address]         Social History:    Social History     Tobacco Use    Smoking status: Current Some Day Smoker     Packs/day: 0.50     Years: 20.00     Pack years: 10.00     Types: Cigarettes    Smokeless tobacco: Never Used   Substance Use Topics    Alcohol use: Not Currently                                Ready to quit: Not Answered  Counseling given: Not Answered      Vital Signs (Current):   Vitals:    11/24/21 1516   Weight: 145 lb (65.8 kg)   Height: 5' 11.5\" (1.816 m)                                              BP Readings from Last 3 Encounters:   07/02/19 109/60   05/21/19 (!) 142/73   05/17/19 110/60       NPO Status:                                                                                 BMI:   Wt Readings from Last 3 Encounters:   07/02/19 162 lb (73.5 kg)   05/15/19 169 lb (76.7 kg)     Body mass index is 19.94 kg/m².     CBC:   Lab Results   Component Value Date    WBC 7.3 05/21/2019    RBC 3.58 05/21/2019    HGB 10.9 05/21/2019    HCT 33.1 05/21/2019    MCV 92.5 05/21/2019    RDW 11.9 05/21/2019  05/21/2019       CMP:   Lab Results   Component Value Date     05/21/2019    K 4.0 05/21/2019    K 4.0 05/16/2019     05/21/2019    CO2 31 05/21/2019    BUN 11 05/21/2019    CREATININE 0.5 05/21/2019    GFRAA >60 05/21/2019    LABGLOM >60 05/21/2019    GLUCOSE 96 05/21/2019    PROT 6.1 05/10/2015    CALCIUM 9.2 05/21/2019    BILITOT 1.7 05/10/2015    ALKPHOS 137 05/10/2015    AST 67 05/10/2015    ALT 28 05/10/2015       POC Tests: No results for input(s): POCGLU, POCNA, POCK, POCCL, POCBUN, POCHEMO, POCHCT in the last 72 hours. Coags:   Lab Results   Component Value Date    PROTIME 11.5 05/20/2019    INR 1.0 05/20/2019       HCG (If Applicable): No results found for: PREGTESTUR, PREGSERUM, HCG, HCGQUANT     ABGs: No results found for: PHART, PO2ART, KND6YBV, YHH4NJE, BEART, C4KJQUJK     Type & Screen (If Applicable):  No results found for: LABABO, LABRH    Drug/Infectious Status (If Applicable):  No results found for: HIV, HEPCAB    COVID-19 Screening (If Applicable): No results found for: COVID19        Anesthesia Evaluation  Patient summary reviewed  Airway: Mallampati: II  TM distance: >3 FB   Neck ROM: full  Mouth opening: > = 3 FB Dental: normal exam         Pulmonary: breath sounds clear to auscultation  (+) current smoker (10 pk yrs)          Patient smoked on day of surgery. Cardiovascular:Negative CV ROS              Rate: normal                    Neuro/Psych:   (+) neuromuscular disease:, psychiatric history:            GI/Hepatic/Renal: Neg GI/Hepatic/Renal ROS            Endo/Other: Negative Endo/Other ROS                     ROS comment: neutrophilic leukocytosis Abdominal:         (-) obese       Vascular: negative vascular ROS. Other Findings:           Anesthesia Plan      MAC     ASA 2       Induction: intravenous. MIPS: Prophylactic antiemetics administered. Anesthetic plan and risks discussed with patient.       Plan discussed with Miguel Hassan MD   12/2/2021    DOS STAFF ADDENDUM:    Pt seen and examined, chart reviewed (including anesthesia, drug and allergy history). Anesthetic plan, risks, benefits, alternatives, and personnel involved discussed with patient. Patient verbalized an understanding and agrees to proceed. Plan discussed with care team members and agreed upon.     Chely Whyte MD  Staff Anesthesiologist  12:18 PM

## 2021-12-03 ENCOUNTER — HOSPITAL ENCOUNTER (OUTPATIENT)
Age: 51
Setting detail: OUTPATIENT SURGERY
Discharge: HOME | End: 2021-12-03
Attending: PODIATRIST | Admitting: PODIATRIST
Payer: COMMERCIAL

## 2021-12-03 ENCOUNTER — ANESTHESIA (OUTPATIENT)
Dept: OPERATING ROOM | Age: 51
End: 2021-12-03
Payer: COMMERCIAL

## 2021-12-03 VITALS
DIASTOLIC BLOOD PRESSURE: 65 MMHG | OXYGEN SATURATION: 100 % | RESPIRATION RATE: 14 BRPM | SYSTOLIC BLOOD PRESSURE: 124 MMHG

## 2021-12-03 VITALS
SYSTOLIC BLOOD PRESSURE: 121 MMHG | WEIGHT: 147 LBS | TEMPERATURE: 97 F | DIASTOLIC BLOOD PRESSURE: 54 MMHG | HEART RATE: 80 BPM | RESPIRATION RATE: 18 BRPM | OXYGEN SATURATION: 99 % | BODY MASS INDEX: 19.91 KG/M2 | HEIGHT: 72 IN

## 2021-12-03 DIAGNOSIS — M79.672 PAIN IN LEFT FOOT: Primary | ICD-10-CM

## 2021-12-03 PROCEDURE — 6370000000 HC RX 637 (ALT 250 FOR IP): Performed by: ANESTHESIOLOGY

## 2021-12-03 PROCEDURE — 2580000003 HC RX 258: Performed by: ANESTHESIOLOGY

## 2021-12-03 PROCEDURE — 2720000010 HC SURG SUPPLY STERILE: Performed by: PODIATRIST

## 2021-12-03 PROCEDURE — 3700000001 HC ADD 15 MINUTES (ANESTHESIA): Performed by: PODIATRIST

## 2021-12-03 PROCEDURE — 6360000002 HC RX W HCPCS

## 2021-12-03 PROCEDURE — 6360000002 HC RX W HCPCS: Performed by: PODIATRIST

## 2021-12-03 PROCEDURE — 3700000000 HC ANESTHESIA ATTENDED CARE: Performed by: PODIATRIST

## 2021-12-03 PROCEDURE — 3600000013 HC SURGERY LEVEL 3 ADDTL 15MIN: Performed by: PODIATRIST

## 2021-12-03 PROCEDURE — 7100000011 HC PHASE II RECOVERY - ADDTL 15 MIN: Performed by: PODIATRIST

## 2021-12-03 PROCEDURE — 2709999900 HC NON-CHARGEABLE SUPPLY: Performed by: PODIATRIST

## 2021-12-03 PROCEDURE — 2580000003 HC RX 258

## 2021-12-03 PROCEDURE — 6360000002 HC RX W HCPCS: Performed by: ANESTHESIOLOGY

## 2021-12-03 PROCEDURE — 7100000010 HC PHASE II RECOVERY - FIRST 15 MIN: Performed by: PODIATRIST

## 2021-12-03 PROCEDURE — 2500000003 HC RX 250 WO HCPCS: Performed by: PODIATRIST

## 2021-12-03 PROCEDURE — L4360 PNEUMAT WALKING BOOT PRE CST: HCPCS | Performed by: PODIATRIST

## 2021-12-03 PROCEDURE — 3600000003 HC SURGERY LEVEL 3 BASE: Performed by: PODIATRIST

## 2021-12-03 PROCEDURE — 88305 TISSUE EXAM BY PATHOLOGIST: CPT

## 2021-12-03 DEVICE — GRAFT HUM TISS NEO L7.5IN FORESKIN PROC APLIGRAF: Type: IMPLANTABLE DEVICE | Site: FOOT | Status: FUNCTIONAL

## 2021-12-03 RX ORDER — PROMETHAZINE HYDROCHLORIDE 25 MG/ML
6.25 INJECTION, SOLUTION INTRAMUSCULAR; INTRAVENOUS
Status: DISCONTINUED | OUTPATIENT
Start: 2021-12-03 | End: 2021-12-03 | Stop reason: HOSPADM

## 2021-12-03 RX ORDER — HYDRALAZINE HYDROCHLORIDE 20 MG/ML
5 INJECTION INTRAMUSCULAR; INTRAVENOUS EVERY 10 MIN PRN
Status: DISCONTINUED | OUTPATIENT
Start: 2021-12-03 | End: 2021-12-03 | Stop reason: HOSPADM

## 2021-12-03 RX ORDER — MEPERIDINE HYDROCHLORIDE 25 MG/ML
12.5 INJECTION INTRAMUSCULAR; INTRAVENOUS; SUBCUTANEOUS EVERY 5 MIN PRN
Status: DISCONTINUED | OUTPATIENT
Start: 2021-12-03 | End: 2021-12-03 | Stop reason: HOSPADM

## 2021-12-03 RX ORDER — HYDROCODONE BITARTRATE AND ACETAMINOPHEN 5; 325 MG/1; MG/1
1 TABLET ORAL PRN
Status: COMPLETED | OUTPATIENT
Start: 2021-12-03 | End: 2021-12-03

## 2021-12-03 RX ORDER — PROPOFOL 10 MG/ML
INJECTION, EMULSION INTRAVENOUS CONTINUOUS PRN
Status: DISCONTINUED | OUTPATIENT
Start: 2021-12-03 | End: 2021-12-03 | Stop reason: SDUPTHER

## 2021-12-03 RX ORDER — FENTANYL CITRATE 50 UG/ML
INJECTION, SOLUTION INTRAMUSCULAR; INTRAVENOUS PRN
Status: DISCONTINUED | OUTPATIENT
Start: 2021-12-03 | End: 2021-12-03 | Stop reason: SDUPTHER

## 2021-12-03 RX ORDER — HYDROCODONE BITARTRATE AND ACETAMINOPHEN 5; 325 MG/1; MG/1
1 TABLET ORAL EVERY 4 HOURS PRN
Qty: 30 TABLET | Refills: 0 | Status: SHIPPED | OUTPATIENT
Start: 2021-12-03 | End: 2021-12-10

## 2021-12-03 RX ORDER — SODIUM CHLORIDE, SODIUM LACTATE, POTASSIUM CHLORIDE, CALCIUM CHLORIDE 600; 310; 30; 20 MG/100ML; MG/100ML; MG/100ML; MG/100ML
INJECTION, SOLUTION INTRAVENOUS CONTINUOUS
Status: DISCONTINUED | OUTPATIENT
Start: 2021-12-03 | End: 2021-12-03 | Stop reason: HOSPADM

## 2021-12-03 RX ORDER — FENTANYL CITRATE 50 UG/ML
25 INJECTION, SOLUTION INTRAMUSCULAR; INTRAVENOUS EVERY 5 MIN PRN
Status: DISCONTINUED | OUTPATIENT
Start: 2021-12-03 | End: 2021-12-03 | Stop reason: HOSPADM

## 2021-12-03 RX ORDER — SODIUM CHLORIDE, SODIUM LACTATE, POTASSIUM CHLORIDE, CALCIUM CHLORIDE 600; 310; 30; 20 MG/100ML; MG/100ML; MG/100ML; MG/100ML
INJECTION, SOLUTION INTRAVENOUS CONTINUOUS PRN
Status: DISCONTINUED | OUTPATIENT
Start: 2021-12-03 | End: 2021-12-03 | Stop reason: SDUPTHER

## 2021-12-03 RX ORDER — HYDROCODONE BITARTRATE AND ACETAMINOPHEN 5; 325 MG/1; MG/1
2 TABLET ORAL PRN
Status: COMPLETED | OUTPATIENT
Start: 2021-12-03 | End: 2021-12-03

## 2021-12-03 RX ORDER — LABETALOL HYDROCHLORIDE 5 MG/ML
5 INJECTION, SOLUTION INTRAVENOUS EVERY 10 MIN PRN
Status: DISCONTINUED | OUTPATIENT
Start: 2021-12-03 | End: 2021-12-03 | Stop reason: HOSPADM

## 2021-12-03 RX ORDER — MORPHINE SULFATE 2 MG/ML
2 INJECTION, SOLUTION INTRAMUSCULAR; INTRAVENOUS EVERY 5 MIN PRN
Status: DISCONTINUED | OUTPATIENT
Start: 2021-12-03 | End: 2021-12-03 | Stop reason: HOSPADM

## 2021-12-03 RX ORDER — MIDAZOLAM HYDROCHLORIDE 1 MG/ML
INJECTION INTRAMUSCULAR; INTRAVENOUS PRN
Status: DISCONTINUED | OUTPATIENT
Start: 2021-12-03 | End: 2021-12-03 | Stop reason: SDUPTHER

## 2021-12-03 RX ADMIN — PROPOFOL 100 MCG/KG/MIN: 10 INJECTION, EMULSION INTRAVENOUS at 12:56

## 2021-12-03 RX ADMIN — HYDROCODONE BITARTRATE AND ACETAMINOPHEN 1 TABLET: 5; 325 TABLET ORAL at 14:10

## 2021-12-03 RX ADMIN — SODIUM CHLORIDE, POTASSIUM CHLORIDE, SODIUM LACTATE AND CALCIUM CHLORIDE: 600; 310; 30; 20 INJECTION, SOLUTION INTRAVENOUS at 11:28

## 2021-12-03 RX ADMIN — FENTANYL CITRATE 50 MCG: 50 INJECTION, SOLUTION INTRAMUSCULAR; INTRAVENOUS at 13:58

## 2021-12-03 RX ADMIN — FENTANYL CITRATE 50 MCG: 50 INJECTION, SOLUTION INTRAMUSCULAR; INTRAVENOUS at 12:56

## 2021-12-03 RX ADMIN — MIDAZOLAM 50 MG: 1 INJECTION INTRAMUSCULAR; INTRAVENOUS at 13:57

## 2021-12-03 RX ADMIN — FENTANYL CITRATE 50 MCG: 50 INJECTION, SOLUTION INTRAMUSCULAR; INTRAVENOUS at 13:03

## 2021-12-03 RX ADMIN — MIDAZOLAM 2 MG: 1 INJECTION INTRAMUSCULAR; INTRAVENOUS at 12:53

## 2021-12-03 RX ADMIN — SODIUM CHLORIDE, POTASSIUM CHLORIDE, SODIUM LACTATE AND CALCIUM CHLORIDE: 600; 310; 30; 20 INJECTION, SOLUTION INTRAVENOUS at 12:53

## 2021-12-03 RX ADMIN — Medication 2000 MG: at 12:52

## 2021-12-03 ASSESSMENT — PULMONARY FUNCTION TESTS
PIF_VALUE: 1
PIF_VALUE: 0
PIF_VALUE: 1
PIF_VALUE: 0
PIF_VALUE: 1
PIF_VALUE: 1
PIF_VALUE: 0
PIF_VALUE: 1
PIF_VALUE: 0
PIF_VALUE: 1

## 2021-12-03 ASSESSMENT — PAIN SCALES - GENERAL
PAINLEVEL_OUTOF10: 0
PAINLEVEL_OUTOF10: 3
PAINLEVEL_OUTOF10: 5
PAINLEVEL_OUTOF10: 5
PAINLEVEL_OUTOF10: 4

## 2021-12-03 ASSESSMENT — PAIN DESCRIPTION - PROGRESSION
CLINICAL_PROGRESSION: NOT CHANGED
CLINICAL_PROGRESSION: GRADUALLY IMPROVING
CLINICAL_PROGRESSION: GRADUALLY IMPROVING

## 2021-12-03 ASSESSMENT — PAIN - FUNCTIONAL ASSESSMENT: PAIN_FUNCTIONAL_ASSESSMENT: 0-10

## 2021-12-03 ASSESSMENT — PAIN DESCRIPTION - PAIN TYPE
TYPE: SURGICAL PAIN
TYPE: SURGICAL PAIN

## 2021-12-03 NOTE — ANESTHESIA POSTPROCEDURE EVALUATION
Department of Anesthesiology  Postprocedure Note    Patient: Romaine Schmid  MRN: [id number]  YOB: 1970  Date of evaluation: 12/3/2021  Time:  2:47 PM     Procedure Summary     Date: 12/03/21 Room / Location: [address] [place] [address] 03 / [facility]    Anesthesia Start: 7800 Anesthesia Stop: 1401    Procedure: EXCISION OF SKIN LESIONS (X2) LEFT FOOT WITH APPLICATION OF A PLIGRAFT SKIN GRAFT, PARTIAL SESAMOIDECTOMY TIBIAL LEFT FOOT(CPT 12641, 77370) ([address] TO PERFORM SPLIT THICKNESS GRAFT) (Left ) Diagnosis: (SKIN LESION LEFT FOOT)    Surgeons: Cristiane Pierson DPM Responsible Provider: Belkys Foster MD    Anesthesia Type: MAC ASA Status: 2          Anesthesia Type: MAC    Nida Phase I: Nida Score: 10    Nida Phase II: Nida Score: 10    Last vitals: Reviewed and per EMR flowsheets.        Anesthesia Post Evaluation    Patient location during evaluation: PACU  Patient participation: complete - patient participated  Level of consciousness: awake and alert  Airway patency: patent  Nausea & Vomiting: no nausea and no vomiting  Complications: no  Cardiovascular status: hemodynamically stable  Respiratory status: acceptable  Hydration status: euvolemic

## 2021-12-03 NOTE — OP NOTE
Operative Note      Patient: Alie Moore  YOB: 1970  MRN: [id number]    Date of Procedure: 12/3/2021    Pre-Op Diagnosis: SKIN LESION LEFT FOOT    Post-Op Diagnosis: Same       Procedure(s):  EXCISION OF SKIN LESIONS (X2) LEFT FOOT WITH APPLICATION OF A PLIGRAFT SKIN GRAFT (CPT 69401, 19151) (EQUIPMENT NEEDED FROM [facility] TO PERFORM SPLIT THICKNESS GRAFT)    Surgeon(s):  Sridevi Segundo DPM    Assistant:   Resident: Tomer Gonzales DPM    Anesthesia: Monitor Anesthesia Care    Estimated Blood Loss (mL): Minimal    Complications: None    Specimens:   ID Type Source Tests Collected by Time Destination   A : skin lesion left foot Tissue Tissue SURGICAL PATHOLOGY Sridevi Segundo DPM 12/3/2021 1330        Implants:  * No implants in log *      Drains: * No LDAs found *    Findings: Consistent with diagnosis    Detailed Description of Procedure:   Patient presented into the operative room and placed in the operative table in the supine position. Patient ordered IV sedation by department of seizure. Once sedated the operative foot was localized by the surgeon comprised of an equal mixture of 0.5% Marcaine plain along with 2% lidocaine plain totaling 10 cc after which time the patient's foot was prepared scrubbed and draped in a sterile fashion.   Ankle tourniquet at 200 mmHg pressure was utilized    Attention first directed to the medial aspect of left foot where an incision was made under the tibial sesamoid this was deep with sharp dissection exposing the tibial sesamoid incision into the periosteum was made a sagittal saw was then used to remove the plantar half of the tibial sesamoid was resected it was smoothed with rongeur and rasp at this point the surgery site was flushed with saline deep structures then reapproximated 3-0 Monocryl and the skin was then reapproximated 4-0 nylon    Attention to the plantar aspect of the left foot were painful lesion nonulcerated was identified 10 blade then used to excise the lesion all borders which is excised in toto and freed from the plantar fascia showing no evidence of any type of fibroma the areas were then secured with Apligraf to help reduce the edges and help promote granulation epithelization and the graft was secured with Vicryl and a dry moist bolsters dressing was then applied along the dry sterile dressing at this time tourniquet was deflated good cap refill time return no evidence of any excessive bleeding identified or seen. Patient left upper with recovery will be seen in the office 1 week for postop visit. Patient was informed preoperatively and reminded postoperatively that a secondary graft will be needed here in the next couple of weeks once early epithelization and granulation is noted and identified. Elvis Toth DPM   Board Certified Foot and Ankle Surgeon  Office: [phone]  Cell:  [phone]    Electronically signed by Alethea Jennings DPM on 12/3/2021 at 1:32 PM

## 2021-12-10 RX ORDER — M-VIT,TX,IRON,MINS/CALC/FOLIC 27MG-0.4MG
1 TABLET ORAL DAILY
COMMUNITY

## 2021-12-16 ENCOUNTER — ANESTHESIA EVENT (OUTPATIENT)
Dept: OPERATING ROOM | Age: 51
End: 2021-12-16
Payer: COMMERCIAL

## 2021-12-16 ASSESSMENT — LIFESTYLE VARIABLES: SMOKING_STATUS: 1

## 2021-12-16 NOTE — ANESTHESIA PRE PROCEDURE
Department of Anesthesiology  Preprocedure Note       Name:  Bharti Conte   Age:  45 y.o.  :  1970                                          MRN:  [id number]         Date:  2021      Surgeon: Kayli Baires):  Celina Zacarias DPM    Procedure: Procedure(s):  LEFT FOOT WOUND DEBRIDEMENT AND APPLICATION OF APLIGRAF    Medications prior to admission:   Prior to Admission medications    Medication Sig Start Date End Date Taking? Authorizing Provider   Multiple Vitamins-Minerals (THERAPEUTIC MULTIVITAMIN-MINERALS) tablet Take 1 tablet by mouth daily    Historical Provider, MD   docusate sodium (COLACE, DULCOLAX) 100 MG CAPS Take 100 mg by mouth 2 times daily 19   MILYL Altamirano - CNP   bisacodyl (DULCOLAX) 5 MG EC tablet Take 1 tablet by mouth 2 times daily as needed for Constipation 19   MILLY Altamirano CNP   sertraline (ZOLOFT) 50 MG tablet Take 50 mg by mouth nightly    Historical Provider, MD   gabapentin (NEURONTIN) 300 MG capsule Take 300 mg by mouth 3 times daily. Historical Provider, MD   vitamin B-1 (THIAMINE) 100 MG tablet Take 100 mg by mouth daily    Historical Provider, MD       Current medications:    Current Outpatient Medications   Medication Sig Dispense Refill    Multiple Vitamins-Minerals (THERAPEUTIC MULTIVITAMIN-MINERALS) tablet Take 1 tablet by mouth daily      docusate sodium (COLACE, DULCOLAX) 100 MG CAPS Take 100 mg by mouth 2 times daily      bisacodyl (DULCOLAX) 5 MG EC tablet Take 1 tablet by mouth 2 times daily as needed for Constipation      sertraline (ZOLOFT) 50 MG tablet Take 50 mg by mouth nightly      gabapentin (NEURONTIN) 300 MG capsule Take 300 mg by mouth 3 times daily.  vitamin B-1 (THIAMINE) 100 MG tablet Take 100 mg by mouth daily       No current facility-administered medications for this visit.        Allergies:  No Known Allergies    Problem List:    Patient Active Problem List   Diagnosis Code    Leukocytosis D72.829    Risk for falls Z91.81    Cellulitis and abscess of foot L03.119, L02.619    Pain in left foot M79.672    Cellulitis of left foot L03. 116    Abscess of foot N02.973    Neutrophilic leukocytosis M84.0       Past Medical History:        Diagnosis Date    Anxiety     Depression     Foot drop, bilateral     Neutrophilic leukocytosis     Patient unaware of this diagnosis. Past Surgical History:        Procedure Laterality Date    ANKLE SURGERY      plates and 5 screws    COLONOSCOPY      CYST REMOVAL      FOOT DEBRIDEMENT Left 5/17/2019    LEFT PLANTAR FOOT INCISION AND DRAINAGE OF ABSCESS  WOUND GENTAMICIN GAUZE PACKING (PULSE VAC) performed by Lisset Pinedo DPM at [address] Left 12/3/2021    EXCISION OF SKIN LESIONS (X2) LEFT FOOT WITH APPLICATION OF A PLIGRAFT SKIN GRAFT, PARTIAL SESAMOIDECTOMY TIBIAL LEFT FOOT(CPT 65451, 46016) (EQUIPMENT NEEDED FROM [facility] TO PERFORM SPLIT THICKNESS GRAFT) performed by Lisset Pinedo DPM at [address]  age 7    injury    TUBAL LIGATION         Social History:    Social History     Tobacco Use    Smoking status: Current Some Day Smoker     Packs/day: 0.50     Years: 20.00     Pack years: 10.00     Types: Cigarettes    Smokeless tobacco: Never Used   Substance Use Topics    Alcohol use: Not Currently                                Ready to quit: Not Answered  Counseling given: Not Answered      Vital Signs (Current): There were no vitals filed for this visit.                                            BP Readings from Last 3 Encounters:   12/03/21 124/65   12/03/21 (!) 121/54   07/02/19 109/60       NPO Status:                                                                                 BMI:   Wt Readings from Last 3 Encounters:   12/03/21 147 lb (66.7 kg)   07/02/19 162 lb (73.5 kg)   05/15/19 169 lb (76.7 kg)     There is no height or weight on file to calculate BMI.    CBC:   Lab Results   Component Value Date    WBC 7.3 05/21/2019    RBC 3.58 05/21/2019    HGB 10.9 05/21/2019    HCT 33.1 05/21/2019    MCV 92.5 05/21/2019    RDW 11.9 05/21/2019     05/21/2019       CMP:   Lab Results   Component Value Date     05/21/2019    K 4.0 05/21/2019    K 4.0 05/16/2019     05/21/2019    CO2 31 05/21/2019    BUN 11 05/21/2019    CREATININE 0.5 05/21/2019    GFRAA >60 05/21/2019    LABGLOM >60 05/21/2019    GLUCOSE 96 05/21/2019    PROT 6.1 05/10/2015    CALCIUM 9.2 05/21/2019    BILITOT 1.7 05/10/2015    ALKPHOS 137 05/10/2015    AST 67 05/10/2015    ALT 28 05/10/2015       POC Tests: No results for input(s): POCGLU, POCNA, POCK, POCCL, POCBUN, POCHEMO, POCHCT in the last 72 hours. Coags:   Lab Results   Component Value Date    PROTIME 11.5 05/20/2019    INR 1.0 05/20/2019       HCG (If Applicable): No results found for: PREGTESTUR, PREGSERUM, HCG, HCGQUANT     ABGs: No results found for: PHART, PO2ART, EGD8CXL, TWI7KUO, BEART, V1HWFPMW     Type & Screen (If Applicable):  No results found for: LABABO, LABRH    Drug/Infectious Status (If Applicable):  No results found for: HIV, HEPCAB    COVID-19 Screening (If Applicable): No results found for: COVID19        Anesthesia Evaluation  Patient summary reviewed and Nursing notes reviewed no history of anesthetic complications:   Airway: Mallampati: II  TM distance: >3 FB   Neck ROM: full  Mouth opening: > = 3 FB Dental:          Pulmonary: breath sounds clear to auscultation  (+) current smoker (10 pk yrs)    (-) COPD (Probable underlying mild COPD given tobacco abuse), asthma and sleep apnea          Patient smoked on day of surgery.                  Cardiovascular:Negative CV ROS  Exercise tolerance: good (>4 METS),       (-) past MI, CAD, CABG/stent, dysrhythmias,  angina,  CHF, orthopnea, PND and  GREGG      Rhythm: regular  Rate: normal           Beta Blocker:  Not on Beta Blocker         Neuro/Psych:   (+) neuromuscular disease (Neuropathy with bilateral foot drop):, psychiatric history:depression/anxiety             GI/Hepatic/Renal: Neg GI/Hepatic/Renal ROS            Endo/Other:              Pt had no PAT visit        ROS comment: Pain in left foot, hx of Cellulitis of left foot and  Abscess of foot     Abdominal:         (-) obese       Vascular: negative vascular ROS. Other Findings:             Anesthesia Plan      MAC     ASA 3       Induction: intravenous. MIPS: Postoperative opioids intended and Prophylactic antiemetics administered. Anesthetic plan and risks discussed with patient. Plan discussed with CRNA. PAT Chart Review:  Chart reviewed per routine by Akash Mosquera MD.  Above represents information available via shared medical record including previous anesthesia history, drug and allergy history. Confirmation of above and final plan per Day of Surgery (DOS) anesthesiologist.      DOS STAFF ADDENDUM:    Pt seen and examined. Chart reviewed including anesthesia, medication, and allergy history. H&P reviewed. Physical exam updated. No interval changes to history or physical examination (unless noted above). NPO status confirmed. Anesthetic plan, risks, benefits, alternatives discussed with patient. Patient verbalized an understanding and agrees to proceed. Chris Swartz DO  Staff Anesthesiologist  11:20 AM      Patient seen and evaluated, anesthesia plan of care reviewed.    MILLY Moore - CRNA

## 2021-12-17 ENCOUNTER — HOSPITAL ENCOUNTER (OUTPATIENT)
Age: 51
Setting detail: OUTPATIENT SURGERY
Discharge: HOME | End: 2021-12-17
Attending: PODIATRIST | Admitting: PODIATRIST
Payer: COMMERCIAL

## 2021-12-17 ENCOUNTER — ANESTHESIA (OUTPATIENT)
Dept: OPERATING ROOM | Age: 51
End: 2021-12-17
Payer: COMMERCIAL

## 2021-12-17 VITALS
TEMPERATURE: 97 F | OXYGEN SATURATION: 99 % | DIASTOLIC BLOOD PRESSURE: 51 MMHG | SYSTOLIC BLOOD PRESSURE: 103 MMHG | RESPIRATION RATE: 15 BRPM

## 2021-12-17 VITALS
HEIGHT: 71 IN | BODY MASS INDEX: 20.58 KG/M2 | WEIGHT: 147 LBS | SYSTOLIC BLOOD PRESSURE: 132 MMHG | DIASTOLIC BLOOD PRESSURE: 49 MMHG | HEART RATE: 80 BPM | TEMPERATURE: 97.2 F | RESPIRATION RATE: 14 BRPM | OXYGEN SATURATION: 100 %

## 2021-12-17 DIAGNOSIS — M79.672 PAIN IN LEFT FOOT: ICD-10-CM

## 2021-12-17 DIAGNOSIS — L03.116 CELLULITIS OF LEFT FOOT: Primary | ICD-10-CM

## 2021-12-17 PROCEDURE — 2500000003 HC RX 250 WO HCPCS: Performed by: NURSE ANESTHETIST, CERTIFIED REGISTERED

## 2021-12-17 PROCEDURE — 7100000010 HC PHASE II RECOVERY - FIRST 15 MIN: Performed by: PODIATRIST

## 2021-12-17 PROCEDURE — 2580000003 HC RX 258: Performed by: ANESTHESIOLOGY

## 2021-12-17 PROCEDURE — 3600000012 HC SURGERY LEVEL 2 ADDTL 15MIN: Performed by: PODIATRIST

## 2021-12-17 PROCEDURE — 2500000003 HC RX 250 WO HCPCS: Performed by: PODIATRIST

## 2021-12-17 PROCEDURE — 3600000002 HC SURGERY LEVEL 2 BASE: Performed by: PODIATRIST

## 2021-12-17 PROCEDURE — 3700000001 HC ADD 15 MINUTES (ANESTHESIA): Performed by: PODIATRIST

## 2021-12-17 PROCEDURE — 3700000000 HC ANESTHESIA ATTENDED CARE: Performed by: PODIATRIST

## 2021-12-17 PROCEDURE — 6360000002 HC RX W HCPCS: Performed by: NURSE ANESTHETIST, CERTIFIED REGISTERED

## 2021-12-17 PROCEDURE — 7100000011 HC PHASE II RECOVERY - ADDTL 15 MIN: Performed by: PODIATRIST

## 2021-12-17 PROCEDURE — 2709999900 HC NON-CHARGEABLE SUPPLY: Performed by: PODIATRIST

## 2021-12-17 PROCEDURE — 6360000002 HC RX W HCPCS: Performed by: PODIATRIST

## 2021-12-17 DEVICE — GRAFT HUM TISS NEO L7.5IN FORESKIN PROC APLIGRAF: Type: IMPLANTABLE DEVICE | Site: FOOT | Status: FUNCTIONAL

## 2021-12-17 RX ORDER — FENTANYL CITRATE 50 UG/ML
INJECTION, SOLUTION INTRAMUSCULAR; INTRAVENOUS PRN
Status: DISCONTINUED | OUTPATIENT
Start: 2021-12-17 | End: 2021-12-17 | Stop reason: SDUPTHER

## 2021-12-17 RX ORDER — ONDANSETRON 2 MG/ML
INJECTION INTRAMUSCULAR; INTRAVENOUS PRN
Status: DISCONTINUED | OUTPATIENT
Start: 2021-12-17 | End: 2021-12-17 | Stop reason: SDUPTHER

## 2021-12-17 RX ORDER — KETAMINE HYDROCHLORIDE 50 MG/ML
INJECTION, SOLUTION, CONCENTRATE INTRAMUSCULAR; INTRAVENOUS PRN
Status: DISCONTINUED | OUTPATIENT
Start: 2021-12-17 | End: 2021-12-17 | Stop reason: SDUPTHER

## 2021-12-17 RX ORDER — SODIUM CHLORIDE, SODIUM LACTATE, POTASSIUM CHLORIDE, CALCIUM CHLORIDE 600; 310; 30; 20 MG/100ML; MG/100ML; MG/100ML; MG/100ML
INJECTION, SOLUTION INTRAVENOUS CONTINUOUS
Status: DISCONTINUED | OUTPATIENT
Start: 2021-12-17 | End: 2021-12-17 | Stop reason: HOSPADM

## 2021-12-17 RX ORDER — CEFAZOLIN SODIUM 2 G/50ML
2000 SOLUTION INTRAVENOUS
Status: DISCONTINUED | OUTPATIENT
Start: 2021-12-17 | End: 2021-12-17 | Stop reason: HOSPADM

## 2021-12-17 RX ORDER — MIDAZOLAM HYDROCHLORIDE 1 MG/ML
INJECTION INTRAMUSCULAR; INTRAVENOUS PRN
Status: DISCONTINUED | OUTPATIENT
Start: 2021-12-17 | End: 2021-12-17 | Stop reason: SDUPTHER

## 2021-12-17 RX ORDER — OXYCODONE HYDROCHLORIDE AND ACETAMINOPHEN 5; 325 MG/1; MG/1
1 TABLET ORAL EVERY 4 HOURS PRN
Qty: 20 TABLET | Refills: 0 | Status: SHIPPED | OUTPATIENT
Start: 2021-12-17 | End: 2021-12-24

## 2021-12-17 RX ORDER — BUPIVACAINE HYDROCHLORIDE 5 MG/ML
INJECTION, SOLUTION EPIDURAL; INTRACAUDAL PRN
Status: DISCONTINUED | OUTPATIENT
Start: 2021-12-17 | End: 2021-12-17 | Stop reason: ALTCHOICE

## 2021-12-17 RX ORDER — PROPOFOL 10 MG/ML
INJECTION, EMULSION INTRAVENOUS PRN
Status: DISCONTINUED | OUTPATIENT
Start: 2021-12-17 | End: 2021-12-17 | Stop reason: SDUPTHER

## 2021-12-17 RX ORDER — LIDOCAINE HYDROCHLORIDE 20 MG/ML
INJECTION, SOLUTION INTRAVENOUS PRN
Status: DISCONTINUED | OUTPATIENT
Start: 2021-12-17 | End: 2021-12-17 | Stop reason: SDUPTHER

## 2021-12-17 RX ADMIN — LIDOCAINE HYDROCHLORIDE 50 MG: 20 INJECTION, SOLUTION INTRAVENOUS at 11:43

## 2021-12-17 RX ADMIN — SODIUM CHLORIDE, POTASSIUM CHLORIDE, SODIUM LACTATE AND CALCIUM CHLORIDE: 600; 310; 30; 20 INJECTION, SOLUTION INTRAVENOUS at 11:37

## 2021-12-17 RX ADMIN — KETAMINE HYDROCHLORIDE 10 MG: 50 INJECTION INTRAMUSCULAR; INTRAVENOUS at 11:53

## 2021-12-17 RX ADMIN — CEFAZOLIN SODIUM 2000 MG: 2 SOLUTION INTRAVENOUS at 11:40

## 2021-12-17 RX ADMIN — PROPOFOL INJECTABLE EMULSION 50 MCG/KG/MIN: 10 INJECTION, EMULSION INTRAVENOUS at 11:44

## 2021-12-17 RX ADMIN — MIDAZOLAM 2 MG: 1 INJECTION INTRAMUSCULAR; INTRAVENOUS at 11:37

## 2021-12-17 RX ADMIN — FENTANYL CITRATE 50 MCG: 50 INJECTION, SOLUTION INTRAMUSCULAR; INTRAVENOUS at 11:43

## 2021-12-17 RX ADMIN — PROPOFOL INJECTABLE EMULSION 50 MG: 10 INJECTION, EMULSION INTRAVENOUS at 11:43

## 2021-12-17 RX ADMIN — ONDANSETRON 4 MG: 2 INJECTION INTRAMUSCULAR; INTRAVENOUS at 11:44

## 2021-12-17 ASSESSMENT — PULMONARY FUNCTION TESTS
PIF_VALUE: 0
PIF_VALUE: 0

## 2021-12-17 ASSESSMENT — PAIN SCALES - GENERAL
PAINLEVEL_OUTOF10: 0

## 2021-12-17 ASSESSMENT — PAIN - FUNCTIONAL ASSESSMENT: PAIN_FUNCTIONAL_ASSESSMENT: 0-10

## 2021-12-17 ASSESSMENT — PAIN DESCRIPTION - DESCRIPTORS: DESCRIPTORS: ACHING

## 2021-12-17 NOTE — ANESTHESIA POSTPROCEDURE EVALUATION
Department of Anesthesiology  Postprocedure Note    Patient: Ahsan Olguin  MRN: [id number]  YOB: 1970  Date of evaluation: 12/17/2021  Time:  12:20 PM     Procedure Summary     Date: 12/17/21 Room / Location: [address] [place] [address] 03 / [address] [facility]    Anesthesia Start: 1137 Anesthesia Stop: 1217    Procedure: LEFT FOOT WOUND DEBRIDEMENT AND APPLICATION OF APLIGRAF (Left ) Diagnosis: (CHRONIC NON PRESSURE ULCER LEFT FOOT)    Surgeons: Shanice Mccarthy, MARVIN Responsible Provider: Jessica Franz DO    Anesthesia Type: MAC ASA Status: 3          Anesthesia Type: MAC    Nida Phase I: Nida Score: 10    Nida Phase II: Nida Score: 10    Last vitals: Reviewed and per EMR flowsheets. Anesthesia Post Evaluation    Patient location during evaluation: PACU  Patient participation: complete - patient participated  Level of consciousness: awake  Pain score: 1  Airway patency: patent  Nausea & Vomiting: no nausea and no vomiting  Complications: no  Cardiovascular status: hemodynamically stable  Respiratory status: acceptable  Comments: Seen and examined. Progressing as expected. No questions or concerns.     Blake Hurst, APRN - CRNA

## 2021-12-17 NOTE — H&P
Update History & Physical    The patient's History and Physical of 12 / 17 / 21 was reviewed with the patient and there were no significant changes. I examined the patient and there were no significant changes from the previous History and Physical.  Blood pressure 129/67, pulse 74, temperature 98 °F (36.7 °C), temperature source Skin, resp. rate 16, height 5' 11\" (1.803 m), weight 147 lb (66.7 kg), SpO2 99 %, not currently breastfeeding. Plan: The risk, benefits, expected outcome, and alternative to the recommended procedure have been discussed with the patient. Patient understands and wants to proceed with the procedure.     Electronically signed by Daniel Jordan DPM on 12/17/21 at 11:21 AM ARIELLE Estrada DPM   Board Certified Foot and Ankle Surgeon  Office: [phone]  Cell:  [phone]

## 2021-12-17 NOTE — OP NOTE
Operative Note      Patient: Dorothy Chaidez  YOB: 1970  MRN: [id number]    Date of Procedure: 12/17/2021    Pre-Op Diagnosis: CHRONIC NON PRESSURE ULCER LEFT FOOT    Post-Op Diagnosis: Same       Procedure(s):  LEFT FOOT WOUND DEBRIDEMENT AND APPLICATION OF APLIGRAF    Surgeon(s):  Simon Munoz DPM    Assistant:   Resident: Milton Ervin DPM    Anesthesia: Monitor Anesthesia Care    Estimated Blood Loss (mL): Minimal    Complications: None    Specimens:   * No specimens in log *    Implants:  Implant Name Type Inv. Item Serial No.  Lot No. LRB No. Used Action   GRAFT HUM TISS FRANCOIS L7.5IN FORESKIN PROC APLIGRAF  GRAFT HUM TISS FRANCOIS L7.5IN FORESKIN PROC APLIGRAF  [employer] [facility]- [id number] Left 1 Implanted         Drains: * No LDAs found *    Findings: Consistent with diagnosis    Detailed Description of Procedure:   Patient presented into the operative room and placed in the operative table in the supine position. Patient delivered IV sedation. Once sedated the operative foot was localized by the surgeon comprised of an equal mixture of 0.5% Marcaine plain along with 2% lidocaine plain totaling 10 cc after which time the patient's foot was prepared scrubbed and draped in a sterile fashion. No tourniquet utilized    Attention directed to the plantar aspect of the left foot where use of a curette was then used to remove the nonviable epidermal dermal and subcuticular tissue. Some granulation tissue was clearly seen and noted.   At this time once all the nonviable tissue was removed the Apligraf was then placed over the wound and secured with Vicryl suture once secured the foot was cleansed a bolster dressing applied in addition to 4 x 4's and Kerlix and an Ace bandage    It should be noted at this time the patient tolerated the surgery well no complication prognosis good patient left operating with recovery and be seen the office 1 week for postop check    Electronically signed by Kalie Barakat DPM on 12/17/2021 at 12:26 PM     Mohit Hicks DPM   Board Certified Foot and Ankle Surgeon  Office: [phone]  Cell:  [phone]

## 2023-05-16 ENCOUNTER — HOSPITAL ENCOUNTER (EMERGENCY)
Age: 53
Discharge: HOME | End: 2023-05-16
Payer: MEDICARE

## 2023-05-16 VITALS
OXYGEN SATURATION: 98 % | BODY MASS INDEX: 24.5 KG/M2 | RESPIRATION RATE: 20 BRPM | DIASTOLIC BLOOD PRESSURE: 75 MMHG | HEIGHT: 71 IN | TEMPERATURE: 98.1 F | HEART RATE: 96 BPM | WEIGHT: 175 LBS | SYSTOLIC BLOOD PRESSURE: 148 MMHG

## 2023-05-16 DIAGNOSIS — K08.89 PAIN, DENTAL: Primary | ICD-10-CM

## 2023-05-16 PROCEDURE — 99211 OFF/OP EST MAY X REQ PHY/QHP: CPT

## 2023-05-16 RX ORDER — AMOXICILLIN 875 MG/1
875 TABLET, COATED ORAL 2 TIMES DAILY
Qty: 20 TABLET | Refills: 0 | Status: SHIPPED | OUTPATIENT
Start: 2023-05-16 | End: 2023-05-26

## 2023-05-16 ASSESSMENT — PAIN SCALES - GENERAL: PAINLEVEL_OUTOF10: 6

## 2023-05-16 ASSESSMENT — PAIN DESCRIPTION - DESCRIPTORS: DESCRIPTORS: ACHING

## 2023-05-16 ASSESSMENT — PAIN - FUNCTIONAL ASSESSMENT: PAIN_FUNCTIONAL_ASSESSMENT: 0-10

## 2023-05-16 ASSESSMENT — PAIN DESCRIPTION - ORIENTATION: ORIENTATION: LEFT

## 2023-05-16 ASSESSMENT — PAIN DESCRIPTION - LOCATION: LOCATION: TEETH

## 2023-05-16 NOTE — ED PROVIDER NOTES
[facility]  EMERGENCY DEPARTMENT ENCOUNTER        NAME: Homero Colmenares  :  1970  MRN:  [id number]  Date of evaluation: 2023  Provider: Carissa Tiwari PA-C  PCP: No primary care provider on file. Note Started : 11:54 AM EDT 23    Chief Complaint: Dental Pain (Tooth pain on pts left lower side. Swelling and pain. )      This is a 58-year-old female that presents to urgent care complaining of some left lower dental pain and gum swelling this past week and has an appointment with her dentist coming up this week. She denies any difficulty breathing or swallowing. On first contact patient she appears to be in no acute distress. Review of Systems  Pertinent positives and negatives are stated within HPI, all other systems reviewed and are negative. Allergies: Patient has no known allergies. --------------------------------------------- PAST HISTORY ---------------------------------------------  Past Medical History:  has a past medical history of Anxiety, Depression, Foot drop, bilateral, and Neutrophilic leukocytosis. Past Surgical History:  has a past surgical history that includes Tubal ligation; cyst removal; Foot Debridement (Left, 2019); Ankle surgery; Foot surgery (Left, 12/3/2021); Colonoscopy; Hand surgery (age 7); and Foot Debridement (Left, 2021). Social History:  reports that she has been smoking cigarettes. She has a 10.00 pack-year smoking history. She has never used smokeless tobacco. She reports that she does not currently use alcohol. She reports that she does not use drugs. Family History: family history is not on file. The patients home medications have been reviewed. The nursing notes within the ED encounter have been reviewed.      ------------------------------------------------SCREENINGS----------------------------------------------                        CIWA Assessment  BP: (!) 148/75  Pulse: 96

## 2024-10-15 ENCOUNTER — APPOINTMENT (OUTPATIENT)
Dept: PRIMARY CARE | Facility: CLINIC | Age: 54
End: 2024-10-15
Payer: MEDICARE

## 2024-10-21 ENCOUNTER — APPOINTMENT (OUTPATIENT)
Dept: PRIMARY CARE | Facility: CLINIC | Age: 54
End: 2024-10-21
Payer: MEDICARE

## 2024-10-21 VITALS
DIASTOLIC BLOOD PRESSURE: 86 MMHG | HEART RATE: 80 BPM | SYSTOLIC BLOOD PRESSURE: 157 MMHG | BODY MASS INDEX: 24.11 KG/M2 | WEIGHT: 178 LBS | HEIGHT: 72 IN | RESPIRATION RATE: 14 BRPM

## 2024-10-21 DIAGNOSIS — Z00.00 ROUTINE MEDICAL EXAM: ICD-10-CM

## 2024-10-21 DIAGNOSIS — Z12.11 COLON CANCER SCREENING: ICD-10-CM

## 2024-10-21 DIAGNOSIS — G61.0 POLYRADICULONEUROPATHY (MULTI): Primary | ICD-10-CM

## 2024-10-21 DIAGNOSIS — Z12.31 ENCOUNTER FOR SCREENING MAMMOGRAM FOR MALIGNANT NEOPLASM OF BREAST: ICD-10-CM

## 2024-10-21 PROBLEM — G47.00 INSOMNIA: Status: ACTIVE | Noted: 2024-10-21

## 2024-10-21 PROBLEM — R41.3 MEMORY PROBLEM: Status: ACTIVE | Noted: 2024-10-21

## 2024-10-21 PROCEDURE — G0439 PPPS, SUBSEQ VISIT: HCPCS | Performed by: FAMILY MEDICINE

## 2024-10-21 PROCEDURE — 3008F BODY MASS INDEX DOCD: CPT | Performed by: FAMILY MEDICINE

## 2024-10-21 PROCEDURE — G2211 COMPLEX E/M VISIT ADD ON: HCPCS | Performed by: FAMILY MEDICINE

## 2024-10-21 PROCEDURE — 99213 OFFICE O/P EST LOW 20 MIN: CPT | Performed by: FAMILY MEDICINE

## 2024-10-21 RX ORDER — GABAPENTIN 300 MG/1
CAPSULE ORAL
COMMUNITY
End: 2024-10-21 | Stop reason: SDUPTHER

## 2024-10-21 RX ORDER — SERTRALINE HYDROCHLORIDE 50 MG/1
1 TABLET, FILM COATED ORAL DAILY
COMMUNITY
Start: 2015-07-15 | End: 2024-10-22 | Stop reason: ALTCHOICE

## 2024-10-21 RX ORDER — GABAPENTIN 300 MG/1
300 CAPSULE ORAL 2 TIMES DAILY
Qty: 180 CAPSULE | Refills: 1 | Status: SHIPPED | OUTPATIENT
Start: 2024-10-21

## 2024-10-21 ASSESSMENT — PATIENT HEALTH QUESTIONNAIRE - PHQ9
1. LITTLE INTEREST OR PLEASURE IN DOING THINGS: NOT AT ALL
2. FEELING DOWN, DEPRESSED OR HOPELESS: NOT AT ALL
SUM OF ALL RESPONSES TO PHQ9 QUESTIONS 1 AND 2: 0

## 2024-10-21 ASSESSMENT — ACTIVITIES OF DAILY LIVING (ADL)
DOING_HOUSEWORK: INDEPENDENT
DRESSING: INDEPENDENT
MANAGING_FINANCES: INDEPENDENT
GROCERY_SHOPPING: NEEDS ASSISTANCE
TAKING_MEDICATION: INDEPENDENT
BATHING: INDEPENDENT

## 2024-10-21 NOTE — PROGRESS NOTES
Subjective   Patient ID: Annabelle Veras is a 54 y.o. female who presents for Medicare Annual Wellness Visit Subsequent (fu).    HPI   Chronic mild low back dull pain and  LE and b/l  feet weakness, pain  and numbness were partially controlled with neurontin. pt controlled BM and bladder well. pt stated that walking or bending  over increased the pain. + imbalance but no  falls.     Review of Systems    Objective   /86   Pulse 80   Resp 14   Ht 1.829 m (6')   Wt 80.7 kg (178 lb)   BMI 24.14 kg/m²     Physical Exam  No distress, well groomed, No sclera icterus. normal respiration, lungs: CTA b/l, heart: RRR, no LE  edema, normal pedal pulses, abd: soft, no tenderness, BS+, mild low back tenderness, SLR test was + b/l decreased  strength and sensation at low extremities, + paresthesia at b/l feet and BLE. poor balance, No depressed mood.    Assessment/Plan   Assessment & Plan  Encounter for screening mammogram for malignant neoplasm of breast    Orders:    BI mammo bilateral screening tomosynthesis; Future    Colon cancer screening    Orders:    Cologuard® colon cancer screening; Future    Cologuard® colon cancer screening    Polyradiculoneuropathy (Multi)  Pain and LE numbness were controlled with Neurontin partially. Cont the same. Fu with neurology  Orders:    gabapentin (Neurontin) 300 mg capsule; Take 1 capsule (300 mg) by mouth 2 times a day.    Referral to Neurology; Future    Routine medical exam [Z00.00]           Medicare wellness visit: pt was capable of performing all ADLs and some IADLs. Pt has good memory and cognitive function. Recommend healthy diet and regular exercise.  Advise eye exam by an OD yearly  and dental exam every 6 months. will monitor blood pressure, cholesterol levels and weight regularly. Recommend sunscreen application if exposed to the sun when the UV index is over 2. Recommend shingle vaccines, hep B vaccine, flu, pcv20 and covid shot.   recommend cologuard, HIV, hep C  tests,   mammogram.  Recommend pt to clear throw rugs at home and keep good lighting at night to avoid falls. Keep hot water for shower below 120F to avoid burn injury.  recommend grab bar at bathtub. Pt declined to have blood tests for now.      Patient was identified as a fall risk. Risk prevention instructions provided.

## 2024-10-22 NOTE — ASSESSMENT & PLAN NOTE
Pain and LE numbness were controlled with Neurontin partially. Cont the same. Fu with neurology  Orders:    gabapentin (Neurontin) 300 mg capsule; Take 1 capsule (300 mg) by mouth 2 times a day.    Referral to Neurology; Future

## 2024-11-08 LAB — NONINV COLON CA DNA+OCC BLD SCRN STL QL: NEGATIVE

## 2025-08-14 ENCOUNTER — APPOINTMENT (OUTPATIENT)
Dept: CARDIOLOGY | Facility: HOSPITAL | Age: 55
DRG: 896 | End: 2025-08-14
Payer: COMMERCIAL

## 2025-08-14 ENCOUNTER — HOSPITAL ENCOUNTER (INPATIENT)
Facility: HOSPITAL | Age: 55
DRG: 896 | End: 2025-08-14
Attending: EMERGENCY MEDICINE | Admitting: INTERNAL MEDICINE
Payer: COMMERCIAL

## 2025-08-14 ENCOUNTER — APPOINTMENT (OUTPATIENT)
Dept: RADIOLOGY | Facility: HOSPITAL | Age: 55
DRG: 896 | End: 2025-08-14
Payer: COMMERCIAL

## 2025-08-14 DIAGNOSIS — R79.89 ELEVATED TROPONIN: ICD-10-CM

## 2025-08-14 DIAGNOSIS — F10.139 ALCOHOL ABUSE WITH WITHDRAWAL (MULTI): Primary | ICD-10-CM

## 2025-08-14 DIAGNOSIS — E87.1 HYPONATREMIA: ICD-10-CM

## 2025-08-14 DIAGNOSIS — E83.42 HYPOMAGNESEMIA: ICD-10-CM

## 2025-08-14 DIAGNOSIS — F10.931: ICD-10-CM

## 2025-08-14 DIAGNOSIS — E44.0 MODERATE PROTEIN-CALORIE MALNUTRITION (WEIGHT FOR AGE 60-74% OF STANDARD) (MULTI): ICD-10-CM

## 2025-08-14 LAB
ALBUMIN SERPL BCP-MCNC: 3.4 G/DL (ref 3.4–5)
ALBUMIN SERPL BCP-MCNC: 3.5 G/DL (ref 3.4–5)
ALBUMIN SERPL BCP-MCNC: 4.1 G/DL (ref 3.4–5)
ALP SERPL-CCNC: 97 U/L (ref 33–110)
ALT SERPL W P-5'-P-CCNC: 109 U/L (ref 7–45)
AMPHETAMINES UR QL SCN: NORMAL
ANION GAP SERPL CALC-SCNC: 16 MMOL/L (ref 10–20)
ANION GAP SERPL CALC-SCNC: 16 MMOL/L (ref 10–20)
ANION GAP SERPL CALC-SCNC: 24 MMOL/L (ref 10–20)
APAP SERPL-MCNC: <10 UG/ML (ref ?–30)
APPEARANCE UR: CLEAR
AST SERPL W P-5'-P-CCNC: 682 U/L (ref 9–39)
BARBITURATES UR QL SCN: NORMAL
BASOPHILS # BLD AUTO: 0.07 X10*3/UL (ref 0–0.1)
BASOPHILS NFR BLD AUTO: 0.3 %
BENZODIAZ UR QL SCN: NORMAL
BILIRUB SERPL-MCNC: 2.1 MG/DL (ref 0–1.2)
BILIRUB UR STRIP.AUTO-MCNC: NEGATIVE MG/DL
BUN SERPL-MCNC: 10 MG/DL (ref 6–23)
BUN SERPL-MCNC: 10 MG/DL (ref 6–23)
BUN SERPL-MCNC: 11 MG/DL (ref 6–23)
BZE UR QL SCN: NORMAL
CALCIUM SERPL-MCNC: 8.1 MG/DL (ref 8.6–10.3)
CALCIUM SERPL-MCNC: 8.2 MG/DL (ref 8.6–10.3)
CALCIUM SERPL-MCNC: 8.6 MG/DL (ref 8.6–10.3)
CANNABINOIDS UR QL SCN: NORMAL
CARDIAC TROPONIN I PNL SERPL HS: 190 NG/L (ref 0–13)
CARDIAC TROPONIN I PNL SERPL HS: 218 NG/L (ref 0–13)
CHLORIDE SERPL-SCNC: 68 MMOL/L (ref 98–107)
CHLORIDE SERPL-SCNC: 74 MMOL/L (ref 98–107)
CHLORIDE SERPL-SCNC: 75 MMOL/L (ref 98–107)
CO2 SERPL-SCNC: 17 MMOL/L (ref 21–32)
CO2 SERPL-SCNC: 23 MMOL/L (ref 21–32)
CO2 SERPL-SCNC: 23 MMOL/L (ref 21–32)
COLOR UR: COLORLESS
CREAT SERPL-MCNC: 0.48 MG/DL (ref 0.5–1.05)
CREAT SERPL-MCNC: 0.5 MG/DL (ref 0.5–1.05)
CREAT SERPL-MCNC: 0.66 MG/DL (ref 0.5–1.05)
EGFRCR SERPLBLD CKD-EPI 2021: >90 ML/MIN/1.73M*2
EOSINOPHIL # BLD AUTO: 0.02 X10*3/UL (ref 0–0.7)
EOSINOPHIL NFR BLD AUTO: 0.1 %
ERYTHROCYTE [DISTWIDTH] IN BLOOD BY AUTOMATED COUNT: 11.5 % (ref 11.5–14.5)
ETHANOL SERPL-MCNC: 60 MG/DL
FENTANYL+NORFENTANYL UR QL SCN: NORMAL
GLUCOSE SERPL-MCNC: 126 MG/DL (ref 74–99)
GLUCOSE SERPL-MCNC: 80 MG/DL (ref 74–99)
GLUCOSE SERPL-MCNC: 90 MG/DL (ref 74–99)
GLUCOSE UR STRIP.AUTO-MCNC: NORMAL MG/DL
HCG UR QL IA.RAPID: NEGATIVE
HCT VFR BLD AUTO: 40.4 % (ref 36–46)
HGB BLD-MCNC: 15 G/DL (ref 12–16)
IMM GRANULOCYTES # BLD AUTO: 0.41 X10*3/UL (ref 0–0.7)
IMM GRANULOCYTES NFR BLD AUTO: 1.8 % (ref 0–0.9)
KETONES UR STRIP.AUTO-MCNC: ABNORMAL MG/DL
LACTATE SERPL-SCNC: 2.3 MMOL/L (ref 0.4–2)
LACTATE SERPL-SCNC: 8 MMOL/L (ref 0.4–2)
LEUKOCYTE ESTERASE UR QL STRIP.AUTO: NEGATIVE
LIPASE SERPL-CCNC: 5 U/L (ref 9–82)
LYMPHOCYTES # BLD AUTO: 2.33 X10*3/UL (ref 1.2–4.8)
LYMPHOCYTES NFR BLD AUTO: 10 %
MAGNESIUM SERPL-MCNC: 1.41 MG/DL (ref 1.6–2.4)
MCH RBC QN AUTO: 31.6 PG (ref 26–34)
MCHC RBC AUTO-ENTMCNC: 37.1 G/DL (ref 32–36)
MCV RBC AUTO: 85 FL (ref 80–100)
METHADONE UR QL SCN: NORMAL
MONOCYTES # BLD AUTO: 0.92 X10*3/UL (ref 0.1–1)
MONOCYTES NFR BLD AUTO: 4 %
NEUTROPHILS # BLD AUTO: 19.44 X10*3/UL (ref 1.2–7.7)
NEUTROPHILS NFR BLD AUTO: 83.8 %
NITRITE UR QL STRIP.AUTO: NEGATIVE
NRBC BLD-RTO: 0 /100 WBCS (ref 0–0)
OPIATES UR QL SCN: NORMAL
OXYCODONE+OXYMORPHONE UR QL SCN: NORMAL
PCP UR QL SCN: NORMAL
PH UR STRIP.AUTO: 6 [PH]
PHOSPHATE SERPL-MCNC: 2.6 MG/DL (ref 2.5–4.9)
PHOSPHATE SERPL-MCNC: 2.7 MG/DL (ref 2.5–4.9)
PLATELET # BLD AUTO: 308 X10*3/UL (ref 150–450)
POTASSIUM SERPL-SCNC: 3.5 MMOL/L (ref 3.5–5.3)
POTASSIUM SERPL-SCNC: 3.6 MMOL/L (ref 3.5–5.3)
POTASSIUM SERPL-SCNC: 4 MMOL/L (ref 3.5–5.3)
PROT SERPL-MCNC: 7.2 G/DL (ref 6.4–8.2)
PROT UR STRIP.AUTO-MCNC: ABNORMAL MG/DL
RBC # BLD AUTO: 4.74 X10*6/UL (ref 4–5.2)
RBC # UR STRIP.AUTO: ABNORMAL MG/DL
RBC #/AREA URNS AUTO: NORMAL /HPF
SALICYLATES SERPL-MCNC: <3 MG/DL (ref ?–20)
SODIUM SERPL-SCNC: 105 MMOL/L (ref 136–145)
SODIUM SERPL-SCNC: 109 MMOL/L (ref 136–145)
SODIUM SERPL-SCNC: 110 MMOL/L (ref 136–145)
SP GR UR STRIP.AUTO: 1.01
UROBILINOGEN UR STRIP.AUTO-MCNC: NORMAL MG/DL
WBC # BLD AUTO: 23.2 X10*3/UL (ref 4.4–11.3)
WBC #/AREA URNS AUTO: NORMAL /HPF

## 2025-08-14 PROCEDURE — 80320 DRUG SCREEN QUANTALCOHOLS: CPT

## 2025-08-14 PROCEDURE — 80053 COMPREHEN METABOLIC PANEL: CPT

## 2025-08-14 PROCEDURE — 2500000005 HC RX 250 GENERAL PHARMACY W/O HCPCS

## 2025-08-14 PROCEDURE — 84484 ASSAY OF TROPONIN QUANT: CPT

## 2025-08-14 PROCEDURE — 80069 RENAL FUNCTION PANEL: CPT | Mod: CCI

## 2025-08-14 PROCEDURE — 80307 DRUG TEST PRSMV CHEM ANLYZR: CPT

## 2025-08-14 PROCEDURE — 81025 URINE PREGNANCY TEST: CPT

## 2025-08-14 PROCEDURE — 93005 ELECTROCARDIOGRAM TRACING: CPT

## 2025-08-14 PROCEDURE — 83605 ASSAY OF LACTIC ACID: CPT

## 2025-08-14 PROCEDURE — 96361 HYDRATE IV INFUSION ADD-ON: CPT

## 2025-08-14 PROCEDURE — 36415 COLL VENOUS BLD VENIPUNCTURE: CPT

## 2025-08-14 PROCEDURE — 96374 THER/PROPH/DIAG INJ IV PUSH: CPT

## 2025-08-14 PROCEDURE — 81001 URINALYSIS AUTO W/SCOPE: CPT

## 2025-08-14 PROCEDURE — 2500000004 HC RX 250 GENERAL PHARMACY W/ HCPCS (ALT 636 FOR OP/ED)

## 2025-08-14 PROCEDURE — 99291 CRITICAL CARE FIRST HOUR: CPT | Mod: 25 | Performed by: EMERGENCY MEDICINE

## 2025-08-14 PROCEDURE — 74177 CT ABD & PELVIS W/CONTRAST: CPT

## 2025-08-14 PROCEDURE — 71275 CT ANGIOGRAPHY CHEST: CPT | Performed by: RADIOLOGY

## 2025-08-14 PROCEDURE — 2020000001 HC ICU ROOM DAILY

## 2025-08-14 PROCEDURE — 82436 ASSAY OF URINE CHLORIDE: CPT | Mod: GEALAB

## 2025-08-14 PROCEDURE — 2500000001 HC RX 250 WO HCPCS SELF ADMINISTERED DRUGS (ALT 637 FOR MEDICARE OP)

## 2025-08-14 PROCEDURE — 85025 COMPLETE CBC W/AUTO DIFF WBC: CPT

## 2025-08-14 PROCEDURE — 70450 CT HEAD/BRAIN W/O DYE: CPT

## 2025-08-14 PROCEDURE — 70450 CT HEAD/BRAIN W/O DYE: CPT | Performed by: RADIOLOGY

## 2025-08-14 PROCEDURE — 99291 CRITICAL CARE FIRST HOUR: CPT

## 2025-08-14 PROCEDURE — 84540 ASSAY OF URINE/UREA-N: CPT | Mod: GEALAB

## 2025-08-14 PROCEDURE — 83735 ASSAY OF MAGNESIUM: CPT

## 2025-08-14 PROCEDURE — 74177 CT ABD & PELVIS W/CONTRAST: CPT | Performed by: RADIOLOGY

## 2025-08-14 PROCEDURE — 83690 ASSAY OF LIPASE: CPT

## 2025-08-14 PROCEDURE — 71275 CT ANGIOGRAPHY CHEST: CPT

## 2025-08-14 PROCEDURE — 2550000001 HC RX 255 CONTRASTS

## 2025-08-14 PROCEDURE — 87040 BLOOD CULTURE FOR BACTERIA: CPT | Mod: GEALAB

## 2025-08-14 PROCEDURE — 83935 ASSAY OF URINE OSMOLALITY: CPT | Mod: GEALAB

## 2025-08-14 RX ORDER — 3% SODIUM CHLORIDE 3 G/100ML
15 INJECTION, SOLUTION INTRAVENOUS ONCE
Status: DISCONTINUED | OUTPATIENT
Start: 2025-08-14 | End: 2025-08-14

## 2025-08-14 RX ORDER — IPRATROPIUM BROMIDE AND ALBUTEROL SULFATE 2.5; .5 MG/3ML; MG/3ML
3 SOLUTION RESPIRATORY (INHALATION) EVERY 6 HOURS PRN
Status: DISCONTINUED | OUTPATIENT
Start: 2025-08-14 | End: 2025-08-18

## 2025-08-14 RX ORDER — MAGNESIUM SULFATE HEPTAHYDRATE 40 MG/ML
2 INJECTION, SOLUTION INTRAVENOUS ONCE
Status: COMPLETED | OUTPATIENT
Start: 2025-08-14 | End: 2025-08-14

## 2025-08-14 RX ORDER — GABAPENTIN 300 MG/1
300 CAPSULE ORAL 2 TIMES DAILY
Status: DISCONTINUED | OUTPATIENT
Start: 2025-08-14 | End: 2025-08-15

## 2025-08-14 RX ORDER — LANOLIN ALCOHOL/MO/W.PET/CERES
100 CREAM (GRAM) TOPICAL DAILY
Status: DISCONTINUED | OUTPATIENT
Start: 2025-08-17 | End: 2025-08-14 | Stop reason: DRUGHIGH

## 2025-08-14 RX ORDER — GUAIFENESIN 600 MG/1
600 TABLET, EXTENDED RELEASE ORAL 2 TIMES DAILY PRN
Status: DISCONTINUED | OUTPATIENT
Start: 2025-08-14 | End: 2025-08-18 | Stop reason: HOSPADM

## 2025-08-14 RX ORDER — ENOXAPARIN SODIUM 100 MG/ML
40 INJECTION SUBCUTANEOUS EVERY 24 HOURS
Status: DISCONTINUED | OUTPATIENT
Start: 2025-08-14 | End: 2025-08-18 | Stop reason: HOSPADM

## 2025-08-14 RX ORDER — THIAMINE HYDROCHLORIDE 100 MG/ML
500 INJECTION, SOLUTION INTRAMUSCULAR; INTRAVENOUS EVERY 8 HOURS
Status: COMPLETED | OUTPATIENT
Start: 2025-08-14 | End: 2025-08-17

## 2025-08-14 RX ORDER — SODIUM CHLORIDE 450 MG/100ML
50 INJECTION, SOLUTION INTRAVENOUS CONTINUOUS
Status: ACTIVE | OUTPATIENT
Start: 2025-08-14 | End: 2025-08-15

## 2025-08-14 RX ORDER — DIAZEPAM 5 MG/1
10 TABLET ORAL EVERY 2 HOUR PRN
Status: DISCONTINUED | OUTPATIENT
Start: 2025-08-14 | End: 2025-08-14

## 2025-08-14 RX ORDER — FOLIC ACID 1 MG/1
1 TABLET ORAL DAILY
Status: DISCONTINUED | OUTPATIENT
Start: 2025-08-14 | End: 2025-08-15

## 2025-08-14 RX ORDER — SODIUM CHLORIDE 9 MG/ML
125 INJECTION, SOLUTION INTRAVENOUS CONTINUOUS
Status: DISCONTINUED | OUTPATIENT
Start: 2025-08-14 | End: 2025-08-14

## 2025-08-14 RX ORDER — LANOLIN ALCOHOL/MO/W.PET/CERES
100 CREAM (GRAM) TOPICAL DAILY
Status: DISCONTINUED | OUTPATIENT
Start: 2025-08-17 | End: 2025-08-15

## 2025-08-14 RX ORDER — DIAZEPAM 5 MG/ML
10 INJECTION, SOLUTION INTRAMUSCULAR; INTRAVENOUS EVERY 2 HOUR PRN
Status: DISCONTINUED | OUTPATIENT
Start: 2025-08-14 | End: 2025-08-15

## 2025-08-14 RX ORDER — THIAMINE HYDROCHLORIDE 100 MG/ML
100 INJECTION, SOLUTION INTRAMUSCULAR; INTRAVENOUS DAILY
Status: DISCONTINUED | OUTPATIENT
Start: 2025-08-15 | End: 2025-08-14 | Stop reason: DRUGHIGH

## 2025-08-14 RX ORDER — MULTIVIT-MIN/IRON FUM/FOLIC AC 7.5 MG-4
1 TABLET ORAL DAILY
Status: DISCONTINUED | OUTPATIENT
Start: 2025-08-14 | End: 2025-08-15

## 2025-08-14 RX ADMIN — SODIUM CHLORIDE, SODIUM LACTATE, POTASSIUM CHLORIDE, AND CALCIUM CHLORIDE 1000 ML: .6; .31; .03; .02 INJECTION, SOLUTION INTRAVENOUS at 17:43

## 2025-08-14 RX ADMIN — GUAIFENESIN 600 MG: 600 TABLET ORAL at 21:46

## 2025-08-14 RX ADMIN — GABAPENTIN 300 MG: 300 CAPSULE ORAL at 21:47

## 2025-08-14 RX ADMIN — MAGNESIUM SULFATE HEPTAHYDRATE 2 G: 2 INJECTION, SOLUTION INTRAVENOUS at 18:30

## 2025-08-14 RX ADMIN — Medication 1 TABLET: at 18:48

## 2025-08-14 RX ADMIN — Medication 1 DOSE: at 20:18

## 2025-08-14 RX ADMIN — IOHEXOL 75 ML: 350 INJECTION, SOLUTION INTRAVENOUS at 18:19

## 2025-08-14 RX ADMIN — ENOXAPARIN SODIUM 40 MG: 100 INJECTION SUBCUTANEOUS at 20:44

## 2025-08-14 RX ADMIN — FOLIC ACID 1 MG: 1 TABLET ORAL at 18:48

## 2025-08-14 RX ADMIN — DIAZEPAM 10 MG: 5 INJECTION INTRAMUSCULAR; INTRAVENOUS at 16:47

## 2025-08-14 RX ADMIN — THIAMINE HYDROCHLORIDE 500 MG: 100 INJECTION, SOLUTION INTRAMUSCULAR; INTRAVENOUS at 21:47

## 2025-08-14 RX ADMIN — SODIUM CHLORIDE 125 ML/HR: 0.9 INJECTION, SOLUTION INTRAVENOUS at 18:28

## 2025-08-14 RX ADMIN — SODIUM CHLORIDE 50 ML/HR: 0.45 INJECTION, SOLUTION INTRAVENOUS at 20:43

## 2025-08-14 SDOH — ECONOMIC STABILITY: INCOME INSECURITY: IN THE PAST 12 MONTHS HAS THE ELECTRIC, GAS, OIL, OR WATER COMPANY THREATENED TO SHUT OFF SERVICES IN YOUR HOME?: NO

## 2025-08-14 SDOH — SOCIAL STABILITY: SOCIAL INSECURITY: WITHIN THE LAST YEAR, HAVE YOU BEEN HUMILIATED OR EMOTIONALLY ABUSED IN OTHER WAYS BY YOUR PARTNER OR EX-PARTNER?: NO

## 2025-08-14 SDOH — SOCIAL STABILITY: SOCIAL INSECURITY
WITHIN THE LAST YEAR, HAVE YOU BEEN KICKED, HIT, SLAPPED, OR OTHERWISE PHYSICALLY HURT BY YOUR PARTNER OR EX-PARTNER?: NO

## 2025-08-14 SDOH — SOCIAL STABILITY: SOCIAL INSECURITY: WERE YOU ABLE TO COMPLETE ALL THE BEHAVIORAL HEALTH SCREENINGS?: YES

## 2025-08-14 SDOH — ECONOMIC STABILITY: FOOD INSECURITY: WITHIN THE PAST 12 MONTHS, THE FOOD YOU BOUGHT JUST DIDN'T LAST AND YOU DIDN'T HAVE MONEY TO GET MORE.: NEVER TRUE

## 2025-08-14 SDOH — ECONOMIC STABILITY: FOOD INSECURITY: WITHIN THE PAST 12 MONTHS, YOU WORRIED THAT YOUR FOOD WOULD RUN OUT BEFORE YOU GOT THE MONEY TO BUY MORE.: NEVER TRUE

## 2025-08-14 SDOH — SOCIAL STABILITY: SOCIAL INSECURITY: WITHIN THE LAST YEAR, HAVE YOU BEEN AFRAID OF YOUR PARTNER OR EX-PARTNER?: NO

## 2025-08-14 SDOH — SOCIAL STABILITY: SOCIAL INSECURITY
WITHIN THE LAST YEAR, HAVE YOU BEEN RAPED OR FORCED TO HAVE ANY KIND OF SEXUAL ACTIVITY BY YOUR PARTNER OR EX-PARTNER?: NO

## 2025-08-14 SDOH — SOCIAL STABILITY: SOCIAL INSECURITY: ARE YOU OR HAVE YOU BEEN THREATENED OR ABUSED PHYSICALLY, EMOTIONALLY, OR SEXUALLY BY ANYONE?: NO

## 2025-08-14 SDOH — SOCIAL STABILITY: SOCIAL INSECURITY: ARE THERE ANY APPARENT SIGNS OF INJURIES/BEHAVIORS THAT COULD BE RELATED TO ABUSE/NEGLECT?: NO

## 2025-08-14 SDOH — SOCIAL STABILITY: SOCIAL INSECURITY: DO YOU FEEL ANYONE HAS EXPLOITED OR TAKEN ADVANTAGE OF YOU FINANCIALLY OR OF YOUR PERSONAL PROPERTY?: NO

## 2025-08-14 SDOH — SOCIAL STABILITY: SOCIAL INSECURITY: DOES ANYONE TRY TO KEEP YOU FROM HAVING/CONTACTING OTHER FRIENDS OR DOING THINGS OUTSIDE YOUR HOME?: NO

## 2025-08-14 SDOH — SOCIAL STABILITY: SOCIAL INSECURITY: HAVE YOU HAD ANY THOUGHTS OF HARMING ANYONE ELSE?: NO

## 2025-08-14 SDOH — SOCIAL STABILITY: SOCIAL INSECURITY: HAS ANYONE EVER THREATENED TO HURT YOUR FAMILY OR YOUR PETS?: NO

## 2025-08-14 SDOH — SOCIAL STABILITY: SOCIAL INSECURITY: HAVE YOU HAD THOUGHTS OF HARMING ANYONE ELSE?: NO

## 2025-08-14 SDOH — SOCIAL STABILITY: SOCIAL INSECURITY: ABUSE: ADULT

## 2025-08-14 SDOH — SOCIAL STABILITY: SOCIAL INSECURITY: DO YOU FEEL UNSAFE GOING BACK TO THE PLACE WHERE YOU ARE LIVING?: NO

## 2025-08-14 ASSESSMENT — LIFESTYLE VARIABLES
HEADACHE, FULLNESS IN HEAD: NOT PRESENT
ORIENTATION AND CLOUDING OF SENSORIUM: ORIENTED AND CAN DO SERIAL ADDITIONS
AUDIT-C TOTAL SCORE: 9
TOTAL SCORE: 5
HOW OFTEN DO YOU HAVE A DRINK CONTAINING ALCOHOL: 4 OR MORE TIMES A WEEK
PAROXYSMAL SWEATS: NO SWEAT VISIBLE
TREMOR: 3
ANXIETY: MILDLY ANXIOUS
AUDITORY DISTURBANCES: NOT PRESENT
SKIP TO QUESTIONS 9-10: 0
TOTAL SCORE: 0
ANXIETY: MILDLY ANXIOUS
HEADACHE, FULLNESS IN HEAD: NOT PRESENT
HOW OFTEN DO YOU HAVE 6 OR MORE DRINKS ON ONE OCCASION: DAILY OR ALMOST DAILY
HOW MANY STANDARD DRINKS CONTAINING ALCOHOL DO YOU HAVE ON A TYPICAL DAY: 3 OR 4
PAROXYSMAL SWEATS: NO SWEAT VISIBLE
ORIENTATION AND CLOUDING OF SENSORIUM: CANNOT DO SERIAL ADDITIONS OR IS UNCERTAIN ABOUT DATE
AGITATION: NORMAL ACTIVITY
NAUSEA AND VOMITING: NO NAUSEA AND NO VOMITING
TREMOR: 2
AUDIT-C TOTAL SCORE: 9
AGITATION: SOMEWHAT MORE THAN NORMAL ACTIVITY
VISUAL DISTURBANCES: NOT PRESENT
HAVE YOU EVER FELT YOU SHOULD CUT DOWN ON YOUR DRINKING: NO
EVER FELT BAD OR GUILTY ABOUT YOUR DRINKING: NO
HAVE PEOPLE ANNOYED YOU BY CRITICIZING YOUR DRINKING: NO
NAUSEA AND VOMITING: NO NAUSEA AND NO VOMITING
TOTAL SCORE: 4
AUDITORY DISTURBANCES: NOT PRESENT
VISUAL DISTURBANCES: NOT PRESENT
EVER HAD A DRINK FIRST THING IN THE MORNING TO STEADY YOUR NERVES TO GET RID OF A HANGOVER: NO

## 2025-08-14 ASSESSMENT — ACTIVITIES OF DAILY LIVING (ADL)
BATHING: INDEPENDENT
FEEDING YOURSELF: INDEPENDENT
DRESSING YOURSELF: INDEPENDENT
LACK_OF_TRANSPORTATION: NO
TOILETING: INDEPENDENT
ADEQUATE_TO_COMPLETE_ADL: YES
PATIENT'S MEMORY ADEQUATE TO SAFELY COMPLETE DAILY ACTIVITIES?: YES
JUDGMENT_ADEQUATE_SAFELY_COMPLETE_DAILY_ACTIVITIES: NO
GROOMING: INDEPENDENT
WALKS IN HOME: INDEPENDENT
HEARING - LEFT EAR: FUNCTIONAL
HEARING - RIGHT EAR: FUNCTIONAL
ASSISTIVE_DEVICE: CANE;WALKER

## 2025-08-14 ASSESSMENT — COGNITIVE AND FUNCTIONAL STATUS - GENERAL
DAILY ACTIVITIY SCORE: 24
STANDING UP FROM CHAIR USING ARMS: A LITTLE
MOBILITY SCORE: 19
CLIMB 3 TO 5 STEPS WITH RAILING: A LITTLE
MOVING TO AND FROM BED TO CHAIR: A LITTLE
WALKING IN HOSPITAL ROOM: A LITTLE
PATIENT BASELINE BEDBOUND: NO
TURNING FROM BACK TO SIDE WHILE IN FLAT BAD: A LITTLE

## 2025-08-14 ASSESSMENT — PAIN - FUNCTIONAL ASSESSMENT
PAIN_FUNCTIONAL_ASSESSMENT: 0-10
PAIN_FUNCTIONAL_ASSESSMENT: 0-10

## 2025-08-14 ASSESSMENT — PATIENT HEALTH QUESTIONNAIRE - PHQ9
SUM OF ALL RESPONSES TO PHQ9 QUESTIONS 1 & 2: 0
1. LITTLE INTEREST OR PLEASURE IN DOING THINGS: NOT AT ALL
2. FEELING DOWN, DEPRESSED OR HOPELESS: NOT AT ALL

## 2025-08-14 ASSESSMENT — ENCOUNTER SYMPTOMS
ABDOMINAL PAIN: 0
FEVER: 0
CHILLS: 1
VOMITING: 0
NAUSEA: 0

## 2025-08-14 ASSESSMENT — PAIN SCALES - GENERAL
PAINLEVEL_OUTOF10: 0 - NO PAIN
PAINLEVEL_OUTOF10: 0 - NO PAIN

## 2025-08-15 PROBLEM — B35.4 TINEA CORPORIS: Status: ACTIVE | Noted: 2025-08-15

## 2025-08-15 PROBLEM — R41.3 MEMORY IMPAIRMENT: Status: ACTIVE | Noted: 2025-08-15

## 2025-08-15 PROBLEM — M25.50 ARTHRALGIA: Status: RESOLVED | Noted: 2025-08-15 | Resolved: 2025-08-15

## 2025-08-15 PROBLEM — E55.9 VITAMIN D DEFICIENCY: Status: ACTIVE | Noted: 2025-08-15

## 2025-08-15 PROBLEM — F17.209 TOBACCO USE DISORDER, CONTINUOUS: Status: ACTIVE | Noted: 2025-08-15

## 2025-08-15 PROBLEM — F41.9 ANXIETY: Status: ACTIVE | Noted: 2025-08-15

## 2025-08-15 PROBLEM — E51.9 VITAMIN B1 DEFICIENCY: Status: ACTIVE | Noted: 2025-08-15

## 2025-08-15 PROBLEM — D64.9 ANEMIA: Status: ACTIVE | Noted: 2025-08-15

## 2025-08-15 PROBLEM — M89.8X7 PAIN IN METATARSUS OF LEFT FOOT: Status: ACTIVE | Noted: 2025-08-15

## 2025-08-15 PROBLEM — T14.8XXA INJURY TO NERVES: Status: ACTIVE | Noted: 2025-08-15

## 2025-08-15 PROBLEM — N92.6 IRREGULAR MENSTRUAL CYCLE: Status: RESOLVED | Noted: 2025-08-15 | Resolved: 2025-08-15

## 2025-08-15 PROBLEM — R60.0 PERIPHERAL EDEMA: Status: ACTIVE | Noted: 2025-08-15

## 2025-08-15 PROBLEM — M79.673 PAIN OF FOOT: Status: ACTIVE | Noted: 2025-08-15

## 2025-08-15 PROBLEM — K59.09 CHRONIC CONSTIPATION: Status: ACTIVE | Noted: 2025-08-15

## 2025-08-15 PROBLEM — M79.672 PAIN IN LEFT FOOT: Status: ACTIVE | Noted: 2019-05-15

## 2025-08-15 PROBLEM — R53.1 WEAKNESS: Status: ACTIVE | Noted: 2025-08-15

## 2025-08-15 LAB
ALBUMIN SERPL BCP-MCNC: 3.3 G/DL (ref 3.4–5)
ALBUMIN SERPL BCP-MCNC: 3.4 G/DL (ref 3.4–5)
ALBUMIN SERPL BCP-MCNC: 3.5 G/DL (ref 3.4–5)
ALP SERPL-CCNC: 72 U/L (ref 33–110)
ALT SERPL W P-5'-P-CCNC: 86 U/L (ref 7–45)
ANION GAP SERPL CALC-SCNC: 10 MMOL/L (ref 10–20)
ANION GAP SERPL CALC-SCNC: 11 MMOL/L (ref 10–20)
ANION GAP SERPL CALC-SCNC: 12 MMOL/L (ref 10–20)
ANION GAP SERPL CALC-SCNC: 12 MMOL/L (ref 10–20)
ANION GAP SERPL CALC-SCNC: 13 MMOL/L (ref 10–20)
ANION GAP SERPL CALC-SCNC: 14 MMOL/L (ref 10–20)
ANION GAP SERPL CALC-SCNC: 15 MMOL/L (ref 10–20)
ANION GAP SERPL CALC-SCNC: 16 MMOL/L (ref 10–20)
AST SERPL W P-5'-P-CCNC: 433 U/L (ref 9–39)
ATRIAL RATE: 115 BPM
BASE EXCESS BLDV CALC-SCNC: 3.1 MMOL/L (ref -2–3)
BASOPHILS # BLD AUTO: 0.03 X10*3/UL (ref 0–0.1)
BASOPHILS NFR BLD AUTO: 0.2 %
BILIRUB DIRECT SERPL-MCNC: 0.6 MG/DL (ref 0–0.3)
BILIRUB SERPL-MCNC: 2.1 MG/DL (ref 0–1.2)
BODY TEMPERATURE: ABNORMAL
BUN SERPL-MCNC: 10 MG/DL (ref 6–23)
BUN SERPL-MCNC: 11 MG/DL (ref 6–23)
CALCIUM SERPL-MCNC: 8.2 MG/DL (ref 8.6–10.3)
CALCIUM SERPL-MCNC: 8.2 MG/DL (ref 8.6–10.3)
CALCIUM SERPL-MCNC: 8.3 MG/DL (ref 8.6–10.3)
CALCIUM SERPL-MCNC: 8.4 MG/DL (ref 8.6–10.3)
CALCIUM SERPL-MCNC: 8.4 MG/DL (ref 8.6–10.3)
CHLORIDE SERPL-SCNC: 75 MMOL/L (ref 98–107)
CHLORIDE SERPL-SCNC: 77 MMOL/L (ref 98–107)
CHLORIDE SERPL-SCNC: 78 MMOL/L (ref 98–107)
CHLORIDE SERPL-SCNC: 79 MMOL/L (ref 98–107)
CHLORIDE SERPL-SCNC: 79 MMOL/L (ref 98–107)
CHLORIDE SERPL-SCNC: 80 MMOL/L (ref 98–107)
CHLORIDE SERPL-SCNC: 80 MMOL/L (ref 98–107)
CHLORIDE SERPL-SCNC: 81 MMOL/L (ref 98–107)
CHLORIDE UR-SCNC: 31 MMOL/L
CHLORIDE/CREATININE (MMOL/G) IN URINE: 235 MMOL/G CREAT (ref 38–318)
CO2 SERPL-SCNC: 24 MMOL/L (ref 21–32)
CO2 SERPL-SCNC: 24 MMOL/L (ref 21–32)
CO2 SERPL-SCNC: 25 MMOL/L (ref 21–32)
CO2 SERPL-SCNC: 26 MMOL/L (ref 21–32)
CO2 SERPL-SCNC: 27 MMOL/L (ref 21–32)
CO2 SERPL-SCNC: 27 MMOL/L (ref 21–32)
CO2 SERPL-SCNC: 28 MMOL/L (ref 21–32)
CO2 SERPL-SCNC: 28 MMOL/L (ref 21–32)
CREAT SERPL-MCNC: 0.43 MG/DL (ref 0.5–1.05)
CREAT SERPL-MCNC: 0.45 MG/DL (ref 0.5–1.05)
CREAT SERPL-MCNC: 0.47 MG/DL (ref 0.5–1.05)
CREAT SERPL-MCNC: 0.49 MG/DL (ref 0.5–1.05)
CREAT SERPL-MCNC: 0.5 MG/DL (ref 0.5–1.05)
CREAT SERPL-MCNC: 0.51 MG/DL (ref 0.5–1.05)
CREAT SERPL-MCNC: 0.52 MG/DL (ref 0.5–1.05)
CREAT SERPL-MCNC: 0.53 MG/DL (ref 0.5–1.05)
CREAT UR-MCNC: 13.2 MG/DL (ref 20–320)
CREAT UR-MCNC: 13.2 MG/DL (ref 20–320)
CRP SERPL-MCNC: 6 MG/DL
EGFRCR SERPLBLD CKD-EPI 2021: >90 ML/MIN/1.73M*2
EOSINOPHIL # BLD AUTO: 0.01 X10*3/UL (ref 0–0.7)
EOSINOPHIL NFR BLD AUTO: 0.1 %
ERYTHROCYTE [DISTWIDTH] IN BLOOD BY AUTOMATED COUNT: 11.4 % (ref 11.5–14.5)
ERYTHROCYTE [SEDIMENTATION RATE] IN BLOOD BY WESTERGREN METHOD: 9 MM/H (ref 0–30)
GLUCOSE SERPL-MCNC: 107 MG/DL (ref 74–99)
GLUCOSE SERPL-MCNC: 114 MG/DL (ref 74–99)
GLUCOSE SERPL-MCNC: 81 MG/DL (ref 74–99)
GLUCOSE SERPL-MCNC: 83 MG/DL (ref 74–99)
GLUCOSE SERPL-MCNC: 85 MG/DL (ref 74–99)
GLUCOSE SERPL-MCNC: 92 MG/DL (ref 74–99)
GLUCOSE SERPL-MCNC: 94 MG/DL (ref 74–99)
GLUCOSE SERPL-MCNC: 96 MG/DL (ref 74–99)
GLUCOSE SERPL-MCNC: 97 MG/DL (ref 74–99)
GLUCOSE SERPL-MCNC: 99 MG/DL (ref 74–99)
HCO3 BLDV-SCNC: 25.1 MMOL/L (ref 22–26)
HCT VFR BLD AUTO: 35.6 % (ref 36–46)
HGB BLD-MCNC: 13.2 G/DL (ref 12–16)
HOLD SPECIMEN: NORMAL
IMM GRANULOCYTES # BLD AUTO: 0.09 X10*3/UL (ref 0–0.7)
IMM GRANULOCYTES NFR BLD AUTO: 0.6 % (ref 0–0.9)
INHALED O2 CONCENTRATION: 21 %
LACTATE SERPL-SCNC: 0.8 MMOL/L (ref 0.4–2)
LYMPHOCYTES # BLD AUTO: 0.81 X10*3/UL (ref 1.2–4.8)
LYMPHOCYTES NFR BLD AUTO: 5.4 %
MAGNESIUM SERPL-MCNC: 2.04 MG/DL (ref 1.6–2.4)
MCH RBC QN AUTO: 31.7 PG (ref 26–34)
MCHC RBC AUTO-ENTMCNC: 37.1 G/DL (ref 32–36)
MCV RBC AUTO: 86 FL (ref 80–100)
MONOCYTES # BLD AUTO: 0.77 X10*3/UL (ref 0.1–1)
MONOCYTES NFR BLD AUTO: 5.1 %
NEUTROPHILS # BLD AUTO: 13.41 X10*3/UL (ref 1.2–7.7)
NEUTROPHILS NFR BLD AUTO: 88.6 %
NRBC BLD-RTO: 0 /100 WBCS (ref 0–0)
OSMOLALITY SERPL: 240 MOSM/KG (ref 280–300)
OSMOLALITY UR: 151 MOSM/KG (ref 200–1200)
OXYHGB MFR BLDV: 86.8 % (ref 45–75)
P AXIS: 76 DEGREES
P OFFSET: 193 MS
P ONSET: 150 MS
PCO2 BLDV: 30 MM HG (ref 41–51)
PH BLDV: 7.53 PH (ref 7.33–7.43)
PHOSPHATE SERPL-MCNC: 2.3 MG/DL (ref 2.5–4.9)
PHOSPHATE SERPL-MCNC: 2.4 MG/DL (ref 2.5–4.9)
PHOSPHATE SERPL-MCNC: 2.4 MG/DL (ref 2.5–4.9)
PHOSPHATE SERPL-MCNC: 2.5 MG/DL (ref 2.5–4.9)
PHOSPHATE SERPL-MCNC: 2.6 MG/DL (ref 2.5–4.9)
PHOSPHATE SERPL-MCNC: 2.6 MG/DL (ref 2.5–4.9)
PHOSPHATE SERPL-MCNC: 2.9 MG/DL (ref 2.5–4.9)
PHOSPHATE SERPL-MCNC: 3.1 MG/DL (ref 2.5–4.9)
PHOSPHATE SERPL-MCNC: 3.2 MG/DL (ref 2.5–4.9)
PHOSPHATE SERPL-MCNC: 3.6 MG/DL (ref 2.5–4.9)
PLATELET # BLD AUTO: 200 X10*3/UL (ref 150–450)
PO2 BLDV: 53 MM HG (ref 35–45)
POTASSIUM SERPL-SCNC: 3.2 MMOL/L (ref 3.5–5.3)
POTASSIUM SERPL-SCNC: 3.2 MMOL/L (ref 3.5–5.3)
POTASSIUM SERPL-SCNC: 3.3 MMOL/L (ref 3.5–5.3)
POTASSIUM SERPL-SCNC: 3.3 MMOL/L (ref 3.5–5.3)
POTASSIUM SERPL-SCNC: 3.5 MMOL/L (ref 3.5–5.3)
POTASSIUM SERPL-SCNC: 3.6 MMOL/L (ref 3.5–5.3)
POTASSIUM SERPL-SCNC: 3.7 MMOL/L (ref 3.5–5.3)
POTASSIUM SERPL-SCNC: 3.8 MMOL/L (ref 3.5–5.3)
POTASSIUM UR-SCNC: 12 MMOL/L
POTASSIUM/CREAT UR-RTO: 91 MMOL/G CREAT
PR INTERVAL: 134 MS
PROT SERPL-MCNC: 6.3 G/DL (ref 6.4–8.2)
Q ONSET: 217 MS
QRS COUNT: 19 BEATS
QRS DURATION: 100 MS
QT INTERVAL: 410 MS
QTC CALCULATION(BAZETT): 567 MS
QTC FREDERICIA: 509 MS
R AXIS: -16 DEGREES
RBC # BLD AUTO: 4.16 X10*6/UL (ref 4–5.2)
SAO2 % BLDV: 89 % (ref 45–75)
SODIUM SERPL-SCNC: 111 MMOL/L (ref 136–145)
SODIUM SERPL-SCNC: 112 MMOL/L (ref 136–145)
SODIUM SERPL-SCNC: 112 MMOL/L (ref 136–145)
SODIUM SERPL-SCNC: 113 MMOL/L (ref 136–145)
SODIUM SERPL-SCNC: 115 MMOL/L (ref 136–145)
SODIUM SERPL-SCNC: 116 MMOL/L (ref 136–145)
SODIUM UR-SCNC: 25 MMOL/L
SODIUM/CREAT UR-RTO: 189 MMOL/G CREAT
T AXIS: 62 DEGREES
T OFFSET: 422 MS
UREA/CREAT UR-SRTO: 6.9 G/G CREAT
UUN UR-MCNC: 91 MG/DL
VENTRICULAR RATE: 115 BPM
WBC # BLD AUTO: 15.1 X10*3/UL (ref 4.4–11.3)

## 2025-08-15 PROCEDURE — 82805 BLOOD GASES W/O2 SATURATION: CPT

## 2025-08-15 PROCEDURE — 83735 ASSAY OF MAGNESIUM: CPT

## 2025-08-15 PROCEDURE — 2500000001 HC RX 250 WO HCPCS SELF ADMINISTERED DRUGS (ALT 637 FOR MEDICARE OP)

## 2025-08-15 PROCEDURE — 36415 COLL VENOUS BLD VENIPUNCTURE: CPT

## 2025-08-15 PROCEDURE — 82040 ASSAY OF SERUM ALBUMIN: CPT

## 2025-08-15 PROCEDURE — 2500000004 HC RX 250 GENERAL PHARMACY W/ HCPCS (ALT 636 FOR OP/ED): Performed by: STUDENT IN AN ORGANIZED HEALTH CARE EDUCATION/TRAINING PROGRAM

## 2025-08-15 PROCEDURE — 80069 RENAL FUNCTION PANEL: CPT | Mod: CCI

## 2025-08-15 PROCEDURE — 2500000004 HC RX 250 GENERAL PHARMACY W/ HCPCS (ALT 636 FOR OP/ED)

## 2025-08-15 PROCEDURE — 85652 RBC SED RATE AUTOMATED: CPT

## 2025-08-15 PROCEDURE — 2500000005 HC RX 250 GENERAL PHARMACY W/O HCPCS

## 2025-08-15 PROCEDURE — 2020000001 HC ICU ROOM DAILY

## 2025-08-15 PROCEDURE — 2500000001 HC RX 250 WO HCPCS SELF ADMINISTERED DRUGS (ALT 637 FOR MEDICARE OP): Performed by: STUDENT IN AN ORGANIZED HEALTH CARE EDUCATION/TRAINING PROGRAM

## 2025-08-15 PROCEDURE — 99291 CRITICAL CARE FIRST HOUR: CPT | Performed by: STUDENT IN AN ORGANIZED HEALTH CARE EDUCATION/TRAINING PROGRAM

## 2025-08-15 PROCEDURE — 83605 ASSAY OF LACTIC ACID: CPT

## 2025-08-15 PROCEDURE — 86140 C-REACTIVE PROTEIN: CPT

## 2025-08-15 PROCEDURE — 80069 RENAL FUNCTION PANEL: CPT

## 2025-08-15 PROCEDURE — 85025 COMPLETE CBC W/AUTO DIFF WBC: CPT

## 2025-08-15 PROCEDURE — 82248 BILIRUBIN DIRECT: CPT

## 2025-08-15 PROCEDURE — 83930 ASSAY OF BLOOD OSMOLALITY: CPT | Mod: GEALAB

## 2025-08-15 RX ORDER — POTASSIUM CHLORIDE 1.5 G/1.58G
40 POWDER, FOR SOLUTION ORAL ONCE
Status: COMPLETED | OUTPATIENT
Start: 2025-08-15 | End: 2025-08-15

## 2025-08-15 RX ORDER — PHENOBARBITAL 32.4 MG/1
97.2 TABLET ORAL 3 TIMES DAILY
Status: DISCONTINUED | OUTPATIENT
Start: 2025-08-15 | End: 2025-08-15

## 2025-08-15 RX ORDER — POTASSIUM CHLORIDE 14.9 MG/ML
20 INJECTION INTRAVENOUS ONCE
Status: DISCONTINUED | OUTPATIENT
Start: 2025-08-15 | End: 2025-08-15

## 2025-08-15 RX ORDER — PHENOBARBITAL SODIUM 65 MG/ML
3 INJECTION, SOLUTION INTRAMUSCULAR; INTRAVENOUS
Status: DISPENSED | OUTPATIENT
Start: 2025-08-15 | End: 2025-08-15

## 2025-08-15 RX ORDER — PHENOBARBITAL 32.4 MG/1
64.8 TABLET ORAL 3 TIMES DAILY
Status: DISCONTINUED | OUTPATIENT
Start: 2025-08-17 | End: 2025-08-15

## 2025-08-15 RX ORDER — POTASSIUM CHLORIDE 14.9 MG/ML
20 INJECTION INTRAVENOUS ONCE
Status: COMPLETED | OUTPATIENT
Start: 2025-08-15 | End: 2025-08-15

## 2025-08-15 RX ORDER — SODIUM CHLORIDE 450 MG/100ML
50 INJECTION, SOLUTION INTRAVENOUS CONTINUOUS
Status: DISCONTINUED | OUTPATIENT
Start: 2025-08-15 | End: 2025-08-16

## 2025-08-15 RX ORDER — PHENOBARBITAL 32.4 MG/1
32.4 TABLET ORAL 3 TIMES DAILY
Status: DISCONTINUED | OUTPATIENT
Start: 2025-08-19 | End: 2025-08-15

## 2025-08-15 RX ORDER — PHENOBARBITAL 32.4 MG/1
64.8 TABLET ORAL EVERY 6 HOURS PRN
Status: DISCONTINUED | OUTPATIENT
Start: 2025-08-15 | End: 2025-08-18 | Stop reason: HOSPADM

## 2025-08-15 RX ORDER — SODIUM CHLORIDE 9 MG/ML
50 INJECTION, SOLUTION INTRAVENOUS CONTINUOUS
Status: DISCONTINUED | OUTPATIENT
Start: 2025-08-15 | End: 2025-08-15

## 2025-08-15 RX ORDER — PHENOBARBITAL SODIUM 65 MG/ML
4 INJECTION, SOLUTION INTRAMUSCULAR; INTRAVENOUS ONCE
Status: DISCONTINUED | OUTPATIENT
Start: 2025-08-15 | End: 2025-08-15

## 2025-08-15 RX ADMIN — Medication: at 00:09

## 2025-08-15 RX ADMIN — GABAPENTIN 300 MG: 300 CAPSULE ORAL at 08:46

## 2025-08-15 RX ADMIN — POTASSIUM CHLORIDE 40 MEQ: 1.5 POWDER, FOR SOLUTION ORAL at 20:22

## 2025-08-15 RX ADMIN — POTASSIUM CHLORIDE 20 MEQ: 14.9 INJECTION, SOLUTION INTRAVENOUS at 01:59

## 2025-08-15 RX ADMIN — POTASSIUM PHOSPHATE, MONOBASIC AND POTASSIUM PHOSPHATE, DIBASIC 15 MMOL: 224; 236 INJECTION, SOLUTION, CONCENTRATE INTRAVENOUS at 22:37

## 2025-08-15 RX ADMIN — SODIUM CHLORIDE 50 ML/HR: 0.45 INJECTION, SOLUTION INTRAVENOUS at 09:22

## 2025-08-15 RX ADMIN — ENOXAPARIN SODIUM 40 MG: 100 INJECTION SUBCUTANEOUS at 20:24

## 2025-08-15 RX ADMIN — SODIUM CHLORIDE 50 ML/HR: 0.9 INJECTION, SOLUTION INTRAVENOUS at 06:19

## 2025-08-15 RX ADMIN — THIAMINE HYDROCHLORIDE 500 MG: 100 INJECTION, SOLUTION INTRAMUSCULAR; INTRAVENOUS at 20:44

## 2025-08-15 RX ADMIN — PHENOBARBITAL SODIUM 191.75 MG: 65 INJECTION INTRAMUSCULAR; INTRAVENOUS at 16:56

## 2025-08-15 RX ADMIN — THIAMINE HYDROCHLORIDE 500 MG: 100 INJECTION, SOLUTION INTRAMUSCULAR; INTRAVENOUS at 16:56

## 2025-08-15 RX ADMIN — THIAMINE HYDROCHLORIDE 500 MG: 100 INJECTION, SOLUTION INTRAMUSCULAR; INTRAVENOUS at 05:50

## 2025-08-15 RX ADMIN — Medication 1 DOSE: at 00:01

## 2025-08-15 RX ADMIN — Medication 250 MG: at 08:46

## 2025-08-15 RX ADMIN — BENZOCAINE AND MENTHOL 1 LOZENGE: 15; 3.6 LOZENGE ORAL at 18:36

## 2025-08-15 RX ADMIN — FOLIC ACID 2 MG: 5 INJECTION, SOLUTION INTRAMUSCULAR; INTRAVENOUS; SUBCUTANEOUS at 16:48

## 2025-08-15 RX ADMIN — DIAZEPAM 10 MG: 5 INJECTION INTRAMUSCULAR; INTRAVENOUS at 08:46

## 2025-08-15 RX ADMIN — FOLIC ACID 1 MG: 1 TABLET ORAL at 08:46

## 2025-08-15 RX ADMIN — POTASSIUM PHOSPHATE, MONOBASIC AND POTASSIUM PHOSPHATE, DIBASIC 15 MMOL: 224; 236 INJECTION, SOLUTION, CONCENTRATE INTRAVENOUS at 05:49

## 2025-08-15 RX ADMIN — Medication 1 TABLET: at 08:46

## 2025-08-15 RX ADMIN — Medication 1 DOSE: at 23:31

## 2025-08-15 ASSESSMENT — LIFESTYLE VARIABLES
TREMOR: 3
TOTAL SCORE: 4
VISUAL DISTURBANCES: NOT PRESENT
ANXIETY: NO ANXIETY, AT EASE
ORIENTATION AND CLOUDING OF SENSORIUM: ORIENTED AND CAN DO SERIAL ADDITIONS
NAUSEA AND VOMITING: NO NAUSEA AND NO VOMITING
PAROXYSMAL SWEATS: NO SWEAT VISIBLE
AUDITORY DISTURBANCES: NOT PRESENT
TOTAL SCORE: 3
TOTAL SCORE: 2
VISUAL DISTURBANCES: NOT PRESENT
AGITATION: NORMAL ACTIVITY
ORIENTATION AND CLOUDING OF SENSORIUM: ORIENTED AND CAN DO SERIAL ADDITIONS
PAROXYSMAL SWEATS: 2
ANXIETY: MILDLY ANXIOUS
TOTAL SCORE: 1
ANXIETY: MILDLY ANXIOUS
VISUAL DISTURBANCES: NOT PRESENT
ANXIETY: NO ANXIETY, AT EASE
VISUAL DISTURBANCES: NOT PRESENT
PAROXYSMAL SWEATS: NO SWEAT VISIBLE
AUDITORY DISTURBANCES: NOT PRESENT
AGITATION: NORMAL ACTIVITY
ORIENTATION AND CLOUDING OF SENSORIUM: ORIENTED AND CAN DO SERIAL ADDITIONS
HEADACHE, FULLNESS IN HEAD: NOT PRESENT
VISUAL DISTURBANCES: NOT PRESENT
HEADACHE, FULLNESS IN HEAD: NOT PRESENT
TOTAL SCORE: 2
NAUSEA AND VOMITING: NO NAUSEA AND NO VOMITING
AUDITORY DISTURBANCES: VERY MILD HARSHNESS OR ABILITY TO FRIGHTEN
TREMOR: 2
HEADACHE, FULLNESS IN HEAD: NOT PRESENT
ANXIETY: NO ANXIETY, AT EASE
TREMOR: NOT VISIBLE, BUT CAN BE FELT FINGERTIP TO FINGERTIP
ANXIETY: MILDLY ANXIOUS
AUDITORY DISTURBANCES: NOT PRESENT
AUDITORY DISTURBANCES: NOT PRESENT
NAUSEA AND VOMITING: NO NAUSEA AND NO VOMITING
HEADACHE, FULLNESS IN HEAD: NOT PRESENT
ORIENTATION AND CLOUDING OF SENSORIUM: ORIENTED AND CAN DO SERIAL ADDITIONS
PAROXYSMAL SWEATS: NO SWEAT VISIBLE
ORIENTATION AND CLOUDING OF SENSORIUM: ORIENTED AND CAN DO SERIAL ADDITIONS
TOTAL SCORE: 1
VISUAL DISTURBANCES: NOT PRESENT
TREMOR: 2
AUDITORY DISTURBANCES: NOT PRESENT
HEADACHE, FULLNESS IN HEAD: NOT PRESENT
NAUSEA AND VOMITING: NO NAUSEA AND NO VOMITING
AUDITORY DISTURBANCES: NOT PRESENT
NAUSEA AND VOMITING: NO NAUSEA AND NO VOMITING
AUDITORY DISTURBANCES: NOT PRESENT
TOTAL SCORE: 2
PAROXYSMAL SWEATS: NO SWEAT VISIBLE
TOTAL SCORE: 7
ANXIETY: MILDLY ANXIOUS
PAROXYSMAL SWEATS: NO SWEAT VISIBLE
PAROXYSMAL SWEATS: 2
NAUSEA AND VOMITING: NO NAUSEA AND NO VOMITING
TOTAL SCORE: 2
ORIENTATION AND CLOUDING OF SENSORIUM: ORIENTED AND CAN DO SERIAL ADDITIONS
PAROXYSMAL SWEATS: NO SWEAT VISIBLE
NAUSEA AND VOMITING: NO NAUSEA AND NO VOMITING
TREMOR: NO TREMOR
AGITATION: NORMAL ACTIVITY
AUDITORY DISTURBANCES: NOT PRESENT
PAROXYSMAL SWEATS: BARELY PERCEPTIBLE SWEATING, PALMS MOIST
ANXIETY: NO ANXIETY, AT EASE
TREMOR: NOT VISIBLE, BUT CAN BE FELT FINGERTIP TO FINGERTIP
AUDITORY DISTURBANCES: NOT PRESENT
ORIENTATION AND CLOUDING OF SENSORIUM: ORIENTED AND CAN DO SERIAL ADDITIONS
TREMOR: NOT VISIBLE, BUT CAN BE FELT FINGERTIP TO FINGERTIP
NAUSEA AND VOMITING: NO NAUSEA AND NO VOMITING
NAUSEA AND VOMITING: NO NAUSEA AND NO VOMITING
HEADACHE, FULLNESS IN HEAD: NOT PRESENT
VISUAL DISTURBANCES: NOT PRESENT
TOTAL SCORE: 6
AUDITORY DISTURBANCES: NOT PRESENT
VISUAL DISTURBANCES: NOT PRESENT
PAROXYSMAL SWEATS: 2
NAUSEA AND VOMITING: NO NAUSEA AND NO VOMITING
TOTAL SCORE: 5
AGITATION: NORMAL ACTIVITY
NAUSEA AND VOMITING: NO NAUSEA AND NO VOMITING
HEADACHE, FULLNESS IN HEAD: NOT PRESENT
VISUAL DISTURBANCES: NOT PRESENT
TREMOR: 2
PAROXYSMAL SWEATS: BARELY PERCEPTIBLE SWEATING, PALMS MOIST
HEADACHE, FULLNESS IN HEAD: NOT PRESENT
ORIENTATION AND CLOUDING OF SENSORIUM: ORIENTED AND CAN DO SERIAL ADDITIONS
TREMOR: NOT VISIBLE, BUT CAN BE FELT FINGERTIP TO FINGERTIP
VISUAL DISTURBANCES: NOT PRESENT
ANXIETY: MILDLY ANXIOUS
AGITATION: NORMAL ACTIVITY
VISUAL DISTURBANCES: NOT PRESENT
AGITATION: NORMAL ACTIVITY
NAUSEA AND VOMITING: NO NAUSEA AND NO VOMITING
AGITATION: NORMAL ACTIVITY
AGITATION: NORMAL ACTIVITY
PAROXYSMAL SWEATS: NO SWEAT VISIBLE
TREMOR: 2
ORIENTATION AND CLOUDING OF SENSORIUM: ORIENTED AND CAN DO SERIAL ADDITIONS
HEADACHE, FULLNESS IN HEAD: NOT PRESENT
ANXIETY: 2
HEADACHE, FULLNESS IN HEAD: NOT PRESENT
AUDITORY DISTURBANCES: NOT PRESENT
ORIENTATION AND CLOUDING OF SENSORIUM: ORIENTED AND CAN DO SERIAL ADDITIONS
HEADACHE, FULLNESS IN HEAD: NOT PRESENT
ORIENTATION AND CLOUDING OF SENSORIUM: ORIENTED AND CAN DO SERIAL ADDITIONS
TOTAL SCORE: 2
ANXIETY: NO ANXIETY, AT EASE
HEADACHE, FULLNESS IN HEAD: NOT PRESENT
VISUAL DISTURBANCES: NOT PRESENT
AGITATION: NORMAL ACTIVITY
ORIENTATION AND CLOUDING OF SENSORIUM: CANNOT DO SERIAL ADDITIONS OR IS UNCERTAIN ABOUT DATE
ANXIETY: NO ANXIETY, AT EASE
TREMOR: 2
TREMOR: 3
AGITATION: NORMAL ACTIVITY

## 2025-08-15 ASSESSMENT — PAIN SCALES - GENERAL
PAINLEVEL_OUTOF10: 0 - NO PAIN
PAINLEVEL_OUTOF10: 0 - NO PAIN

## 2025-08-15 ASSESSMENT — PAIN - FUNCTIONAL ASSESSMENT: PAIN_FUNCTIONAL_ASSESSMENT: 0-10

## 2025-08-16 LAB
ALBUMIN SERPL BCP-MCNC: 3.2 G/DL (ref 3.4–5)
ALBUMIN SERPL BCP-MCNC: 3.3 G/DL (ref 3.4–5)
ALBUMIN SERPL BCP-MCNC: 3.3 G/DL (ref 3.4–5)
ALBUMIN SERPL BCP-MCNC: 3.4 G/DL (ref 3.4–5)
ALBUMIN SERPL BCP-MCNC: 3.7 G/DL (ref 3.4–5)
ALP SERPL-CCNC: 65 U/L (ref 33–110)
ALP SERPL-CCNC: 79 U/L (ref 33–110)
ALT SERPL W P-5'-P-CCNC: 79 U/L (ref 7–45)
ALT SERPL W P-5'-P-CCNC: 83 U/L (ref 7–45)
ANION GAP SERPL CALC-SCNC: 10 MMOL/L (ref 10–20)
ANION GAP SERPL CALC-SCNC: 11 MMOL/L (ref 10–20)
ANION GAP SERPL CALC-SCNC: 11 MMOL/L (ref 10–20)
ANION GAP SERPL CALC-SCNC: 12 MMOL/L (ref 10–20)
AST SERPL W P-5'-P-CCNC: 306 U/L (ref 9–39)
AST SERPL W P-5'-P-CCNC: 317 U/L (ref 9–39)
BASOPHILS # BLD AUTO: 0.03 X10*3/UL (ref 0–0.1)
BASOPHILS NFR BLD AUTO: 0.3 %
BILIRUB DIRECT SERPL-MCNC: 0.5 MG/DL (ref 0–0.3)
BILIRUB SERPL-MCNC: 1.7 MG/DL (ref 0–1.2)
BILIRUB SERPL-MCNC: 1.8 MG/DL (ref 0–1.2)
BUN SERPL-MCNC: 5 MG/DL (ref 6–23)
BUN SERPL-MCNC: 6 MG/DL (ref 6–23)
BUN SERPL-MCNC: 8 MG/DL (ref 6–23)
BUN SERPL-MCNC: 9 MG/DL (ref 6–23)
CALCIUM SERPL-MCNC: 8.1 MG/DL (ref 8.6–10.3)
CALCIUM SERPL-MCNC: 8.1 MG/DL (ref 8.6–10.3)
CALCIUM SERPL-MCNC: 8.2 MG/DL (ref 8.6–10.3)
CALCIUM SERPL-MCNC: 8.3 MG/DL (ref 8.6–10.3)
CALCIUM SERPL-MCNC: 8.6 MG/DL (ref 8.6–10.3)
CALCIUM SERPL-MCNC: 8.9 MG/DL (ref 8.6–10.3)
CHLORIDE SERPL-SCNC: 81 MMOL/L (ref 98–107)
CHLORIDE SERPL-SCNC: 81 MMOL/L (ref 98–107)
CHLORIDE SERPL-SCNC: 82 MMOL/L (ref 98–107)
CHLORIDE SERPL-SCNC: 84 MMOL/L (ref 98–107)
CHLORIDE SERPL-SCNC: 86 MMOL/L (ref 98–107)
CHLORIDE SERPL-SCNC: 89 MMOL/L (ref 98–107)
CO2 SERPL-SCNC: 27 MMOL/L (ref 21–32)
CO2 SERPL-SCNC: 27 MMOL/L (ref 21–32)
CO2 SERPL-SCNC: 28 MMOL/L (ref 21–32)
CO2 SERPL-SCNC: 29 MMOL/L (ref 21–32)
CREAT SERPL-MCNC: 0.35 MG/DL (ref 0.5–1.05)
CREAT SERPL-MCNC: 0.37 MG/DL (ref 0.5–1.05)
CREAT SERPL-MCNC: 0.38 MG/DL (ref 0.5–1.05)
CREAT SERPL-MCNC: 0.4 MG/DL (ref 0.5–1.05)
CREAT SERPL-MCNC: 0.4 MG/DL (ref 0.5–1.05)
CREAT SERPL-MCNC: 0.43 MG/DL (ref 0.5–1.05)
EGFRCR SERPLBLD CKD-EPI 2021: >90 ML/MIN/1.73M*2
EOSINOPHIL # BLD AUTO: 0.06 X10*3/UL (ref 0–0.7)
EOSINOPHIL NFR BLD AUTO: 0.6 %
ERYTHROCYTE [DISTWIDTH] IN BLOOD BY AUTOMATED COUNT: 11.7 % (ref 11.5–14.5)
GLUCOSE SERPL-MCNC: 100 MG/DL (ref 74–99)
GLUCOSE SERPL-MCNC: 107 MG/DL (ref 74–99)
GLUCOSE SERPL-MCNC: 108 MG/DL (ref 74–99)
GLUCOSE SERPL-MCNC: 111 MG/DL (ref 74–99)
HCT VFR BLD AUTO: 32.2 % (ref 36–46)
HGB BLD-MCNC: 11.8 G/DL (ref 12–16)
HOLD SPECIMEN: NORMAL
IMM GRANULOCYTES # BLD AUTO: 0.04 X10*3/UL (ref 0–0.7)
IMM GRANULOCYTES NFR BLD AUTO: 0.4 % (ref 0–0.9)
LYMPHOCYTES # BLD AUTO: 1.37 X10*3/UL (ref 1.2–4.8)
LYMPHOCYTES NFR BLD AUTO: 14.4 %
MAGNESIUM SERPL-MCNC: 1.84 MG/DL (ref 1.6–2.4)
MCH RBC QN AUTO: 32.2 PG (ref 26–34)
MCHC RBC AUTO-ENTMCNC: 36.6 G/DL (ref 32–36)
MCV RBC AUTO: 88 FL (ref 80–100)
MONOCYTES # BLD AUTO: 0.56 X10*3/UL (ref 0.1–1)
MONOCYTES NFR BLD AUTO: 5.9 %
NEUTROPHILS # BLD AUTO: 7.43 X10*3/UL (ref 1.2–7.7)
NEUTROPHILS NFR BLD AUTO: 78.4 %
NRBC BLD-RTO: 0 /100 WBCS (ref 0–0)
PHOSPHATE SERPL-MCNC: 2.2 MG/DL (ref 2.5–4.9)
PHOSPHATE SERPL-MCNC: 2.9 MG/DL (ref 2.5–4.9)
PHOSPHATE SERPL-MCNC: 3.1 MG/DL (ref 2.5–4.9)
PHOSPHATE SERPL-MCNC: 3.3 MG/DL (ref 2.5–4.9)
PHOSPHATE SERPL-MCNC: 3.8 MG/DL (ref 2.5–4.9)
PLATELET # BLD AUTO: 164 X10*3/UL (ref 150–450)
POTASSIUM SERPL-SCNC: 3.3 MMOL/L (ref 3.5–5.3)
POTASSIUM SERPL-SCNC: 3.3 MMOL/L (ref 3.5–5.3)
POTASSIUM SERPL-SCNC: 3.5 MMOL/L (ref 3.5–5.3)
POTASSIUM SERPL-SCNC: 3.6 MMOL/L (ref 3.5–5.3)
POTASSIUM SERPL-SCNC: 3.9 MMOL/L (ref 3.5–5.3)
POTASSIUM SERPL-SCNC: 4.8 MMOL/L (ref 3.5–5.3)
PROT SERPL-MCNC: 6 G/DL (ref 6.4–8.2)
PROT SERPL-MCNC: 6.5 G/DL (ref 6.4–8.2)
RBC # BLD AUTO: 3.66 X10*6/UL (ref 4–5.2)
SODIUM SERPL-SCNC: 115 MMOL/L (ref 136–145)
SODIUM SERPL-SCNC: 116 MMOL/L (ref 136–145)
SODIUM SERPL-SCNC: 117 MMOL/L (ref 136–145)
SODIUM SERPL-SCNC: 119 MMOL/L (ref 136–145)
SODIUM SERPL-SCNC: 121 MMOL/L (ref 136–145)
SODIUM SERPL-SCNC: 123 MMOL/L (ref 136–145)
WBC # BLD AUTO: 9.5 X10*3/UL (ref 4.4–11.3)

## 2025-08-16 PROCEDURE — 36415 COLL VENOUS BLD VENIPUNCTURE: CPT

## 2025-08-16 PROCEDURE — 99291 CRITICAL CARE FIRST HOUR: CPT | Performed by: STUDENT IN AN ORGANIZED HEALTH CARE EDUCATION/TRAINING PROGRAM

## 2025-08-16 PROCEDURE — 82040 ASSAY OF SERUM ALBUMIN: CPT | Performed by: STUDENT IN AN ORGANIZED HEALTH CARE EDUCATION/TRAINING PROGRAM

## 2025-08-16 PROCEDURE — 80069 RENAL FUNCTION PANEL: CPT | Mod: CCI

## 2025-08-16 PROCEDURE — 2500000004 HC RX 250 GENERAL PHARMACY W/ HCPCS (ALT 636 FOR OP/ED)

## 2025-08-16 PROCEDURE — 85025 COMPLETE CBC W/AUTO DIFF WBC: CPT

## 2025-08-16 PROCEDURE — 82248 BILIRUBIN DIRECT: CPT

## 2025-08-16 PROCEDURE — 2500000002 HC RX 250 W HCPCS SELF ADMINISTERED DRUGS (ALT 637 FOR MEDICARE OP, ALT 636 FOR OP/ED): Performed by: STUDENT IN AN ORGANIZED HEALTH CARE EDUCATION/TRAINING PROGRAM

## 2025-08-16 PROCEDURE — 83735 ASSAY OF MAGNESIUM: CPT

## 2025-08-16 PROCEDURE — 2500000004 HC RX 250 GENERAL PHARMACY W/ HCPCS (ALT 636 FOR OP/ED): Performed by: STUDENT IN AN ORGANIZED HEALTH CARE EDUCATION/TRAINING PROGRAM

## 2025-08-16 PROCEDURE — 80069 RENAL FUNCTION PANEL: CPT | Mod: CCI | Performed by: BEHAVIOR TECHNICIAN

## 2025-08-16 PROCEDURE — 99233 SBSQ HOSP IP/OBS HIGH 50: CPT | Performed by: INTERNAL MEDICINE

## 2025-08-16 PROCEDURE — 2020000001 HC ICU ROOM DAILY

## 2025-08-16 PROCEDURE — 2500000001 HC RX 250 WO HCPCS SELF ADMINISTERED DRUGS (ALT 637 FOR MEDICARE OP)

## 2025-08-16 RX ORDER — SODIUM CHLORIDE 450 MG/100ML
50 INJECTION, SOLUTION INTRAVENOUS CONTINUOUS
Status: DISCONTINUED | OUTPATIENT
Start: 2025-08-16 | End: 2025-08-17

## 2025-08-16 RX ORDER — POTASSIUM CHLORIDE 1.5 G/1.58G
40 POWDER, FOR SOLUTION ORAL ONCE
Status: DISCONTINUED | OUTPATIENT
Start: 2025-08-16 | End: 2025-08-16

## 2025-08-16 RX ORDER — DEXTROSE MONOHYDRATE 50 MG/ML
250 INJECTION, SOLUTION INTRAVENOUS CONTINUOUS
Status: ACTIVE | OUTPATIENT
Start: 2025-08-16 | End: 2025-08-16

## 2025-08-16 RX ORDER — POTASSIUM CHLORIDE 14.9 MG/ML
20 INJECTION INTRAVENOUS
Status: COMPLETED | OUTPATIENT
Start: 2025-08-16 | End: 2025-08-16

## 2025-08-16 RX ORDER — POTASSIUM CHLORIDE 20 MEQ/1
40 TABLET, EXTENDED RELEASE ORAL
Status: DISPENSED | OUTPATIENT
Start: 2025-08-16 | End: 2025-08-16

## 2025-08-16 RX ORDER — SODIUM CHLORIDE 9 MG/ML
60 INJECTION, SOLUTION INTRAVENOUS CONTINUOUS
Status: DISCONTINUED | OUTPATIENT
Start: 2025-08-16 | End: 2025-08-16

## 2025-08-16 RX ADMIN — ENOXAPARIN SODIUM 40 MG: 100 INJECTION SUBCUTANEOUS at 19:47

## 2025-08-16 RX ADMIN — BENZOCAINE AND MENTHOL 1 LOZENGE: 15; 3.6 LOZENGE ORAL at 19:47

## 2025-08-16 RX ADMIN — THIAMINE HYDROCHLORIDE 500 MG: 100 INJECTION, SOLUTION INTRAMUSCULAR; INTRAVENOUS at 20:58

## 2025-08-16 RX ADMIN — SODIUM CHLORIDE 60 ML/HR: 0.9 INJECTION, SOLUTION INTRAVENOUS at 12:13

## 2025-08-16 RX ADMIN — THIAMINE HYDROCHLORIDE 500 MG: 100 INJECTION, SOLUTION INTRAMUSCULAR; INTRAVENOUS at 13:26

## 2025-08-16 RX ADMIN — POTASSIUM CHLORIDE 20 MEQ: 14.9 INJECTION, SOLUTION INTRAVENOUS at 06:45

## 2025-08-16 RX ADMIN — SODIUM CHLORIDE 50 ML/HR: 0.45 INJECTION, SOLUTION INTRAVENOUS at 14:38

## 2025-08-16 RX ADMIN — DEXTROSE 250 ML/HR: 5 SOLUTION INTRAVENOUS at 18:41

## 2025-08-16 RX ADMIN — THIAMINE HYDROCHLORIDE 500 MG: 100 INJECTION, SOLUTION INTRAMUSCULAR; INTRAVENOUS at 06:08

## 2025-08-16 RX ADMIN — GUAIFENESIN 600 MG: 600 TABLET ORAL at 20:57

## 2025-08-16 RX ADMIN — BENZOCAINE AND MENTHOL 1 LOZENGE: 15; 3.6 LOZENGE ORAL at 14:38

## 2025-08-16 RX ADMIN — SODIUM CHLORIDE 50 ML/HR: 0.45 INJECTION, SOLUTION INTRAVENOUS at 08:14

## 2025-08-16 RX ADMIN — POTASSIUM CHLORIDE 20 MEQ: 14.9 INJECTION, SOLUTION INTRAVENOUS at 03:28

## 2025-08-16 RX ADMIN — POTASSIUM CHLORIDE 40 MEQ: 1500 TABLET, EXTENDED RELEASE ORAL at 12:13

## 2025-08-16 ASSESSMENT — LIFESTYLE VARIABLES
AGITATION: NORMAL ACTIVITY
TREMOR: NOT VISIBLE, BUT CAN BE FELT FINGERTIP TO FINGERTIP
TOTAL SCORE: 3
NAUSEA AND VOMITING: NO NAUSEA AND NO VOMITING
VISUAL DISTURBANCES: NOT PRESENT
PAROXYSMAL SWEATS: NO SWEAT VISIBLE
NAUSEA AND VOMITING: NO NAUSEA AND NO VOMITING
AGITATION: NORMAL ACTIVITY
PAROXYSMAL SWEATS: NO SWEAT VISIBLE
PAROXYSMAL SWEATS: NO SWEAT VISIBLE
ORIENTATION AND CLOUDING OF SENSORIUM: ORIENTED AND CAN DO SERIAL ADDITIONS
ORIENTATION AND CLOUDING OF SENSORIUM: CANNOT DO SERIAL ADDITIONS OR IS UNCERTAIN ABOUT DATE
BLOOD PRESSURE: 132/66
HEADACHE, FULLNESS IN HEAD: NOT PRESENT
HEADACHE, FULLNESS IN HEAD: NOT PRESENT
NAUSEA AND VOMITING: NO NAUSEA AND NO VOMITING
TREMOR: NOT VISIBLE, BUT CAN BE FELT FINGERTIP TO FINGERTIP
AUDITORY DISTURBANCES: NOT PRESENT
AGITATION: NORMAL ACTIVITY
NAUSEA AND VOMITING: NO NAUSEA AND NO VOMITING
TREMOR: NOT VISIBLE, BUT CAN BE FELT FINGERTIP TO FINGERTIP
AUDITORY DISTURBANCES: NOT PRESENT
TOTAL SCORE: 2
TREMOR: NOT VISIBLE, BUT CAN BE FELT FINGERTIP TO FINGERTIP
AGITATION: NORMAL ACTIVITY
AUDITORY DISTURBANCES: NOT PRESENT
NAUSEA AND VOMITING: NO NAUSEA AND NO VOMITING
TOTAL SCORE: 1
TREMOR: NOT VISIBLE, BUT CAN BE FELT FINGERTIP TO FINGERTIP
TREMOR: NOT VISIBLE, BUT CAN BE FELT FINGERTIP TO FINGERTIP
PAROXYSMAL SWEATS: NO SWEAT VISIBLE
AUDITORY DISTURBANCES: NOT PRESENT
TOTAL SCORE: 3
HEADACHE, FULLNESS IN HEAD: NOT PRESENT
ANXIETY: NO ANXIETY, AT EASE
TREMOR: NOT VISIBLE, BUT CAN BE FELT FINGERTIP TO FINGERTIP
AUDITORY DISTURBANCES: NOT PRESENT
HEADACHE, FULLNESS IN HEAD: NOT PRESENT
TOTAL SCORE: 2
NAUSEA AND VOMITING: NO NAUSEA AND NO VOMITING
PAROXYSMAL SWEATS: NO SWEAT VISIBLE
VISUAL DISTURBANCES: NOT PRESENT
ORIENTATION AND CLOUDING OF SENSORIUM: CANNOT DO SERIAL ADDITIONS OR IS UNCERTAIN ABOUT DATE
HEADACHE, FULLNESS IN HEAD: NOT PRESENT
ANXIETY: MILDLY ANXIOUS
AUDITORY DISTURBANCES: NOT PRESENT
HEADACHE, FULLNESS IN HEAD: NOT PRESENT
PAROXYSMAL SWEATS: NO SWEAT VISIBLE
ANXIETY: 3
ORIENTATION AND CLOUDING OF SENSORIUM: ORIENTED AND CAN DO SERIAL ADDITIONS
ANXIETY: 2
AUDITORY DISTURBANCES: NOT PRESENT
HEADACHE, FULLNESS IN HEAD: NOT PRESENT
ORIENTATION AND CLOUDING OF SENSORIUM: CANNOT DO SERIAL ADDITIONS OR IS UNCERTAIN ABOUT DATE
NAUSEA AND VOMITING: NO NAUSEA AND NO VOMITING
HEADACHE, FULLNESS IN HEAD: NOT PRESENT
VISUAL DISTURBANCES: NOT PRESENT
TREMOR: NOT VISIBLE, BUT CAN BE FELT FINGERTIP TO FINGERTIP
AGITATION: NORMAL ACTIVITY
VISUAL DISTURBANCES: NOT PRESENT
TOTAL SCORE: 4
PAROXYSMAL SWEATS: NO SWEAT VISIBLE
TOTAL SCORE: 1
NAUSEA AND VOMITING: NO NAUSEA AND NO VOMITING
VISUAL DISTURBANCES: NOT PRESENT
ORIENTATION AND CLOUDING OF SENSORIUM: CANNOT DO SERIAL ADDITIONS OR IS UNCERTAIN ABOUT DATE
ANXIETY: NO ANXIETY, AT EASE
AUDITORY DISTURBANCES: NOT PRESENT
AGITATION: NORMAL ACTIVITY
VISUAL DISTURBANCES: NOT PRESENT
VISUAL DISTURBANCES: NOT PRESENT
ANXIETY: NO ANXIETY, AT EASE
AGITATION: NORMAL ACTIVITY
VISUAL DISTURBANCES: NOT PRESENT
TOTAL SCORE: 4
ORIENTATION AND CLOUDING OF SENSORIUM: ORIENTED AND CAN DO SERIAL ADDITIONS
ORIENTATION AND CLOUDING OF SENSORIUM: ORIENTED AND CAN DO SERIAL ADDITIONS
ANXIETY: MILDLY ANXIOUS
ANXIETY: NO ANXIETY, AT EASE
AGITATION: SOMEWHAT MORE THAN NORMAL ACTIVITY
PAROXYSMAL SWEATS: NO SWEAT VISIBLE

## 2025-08-16 ASSESSMENT — PAIN SCALES - GENERAL
PAINLEVEL_OUTOF10: 0 - NO PAIN

## 2025-08-16 ASSESSMENT — PAIN - FUNCTIONAL ASSESSMENT
PAIN_FUNCTIONAL_ASSESSMENT: 0-10

## 2025-08-17 VITALS
DIASTOLIC BLOOD PRESSURE: 60 MMHG | TEMPERATURE: 98.6 F | WEIGHT: 167 LBS | SYSTOLIC BLOOD PRESSURE: 120 MMHG | BODY MASS INDEX: 22.62 KG/M2 | OXYGEN SATURATION: 93 % | HEART RATE: 98 BPM | RESPIRATION RATE: 16 BRPM | HEIGHT: 72 IN

## 2025-08-17 LAB
ALBUMIN SERPL BCP-MCNC: 3.4 G/DL (ref 3.4–5)
ALBUMIN SERPL BCP-MCNC: 3.5 G/DL (ref 3.4–5)
ALBUMIN SERPL BCP-MCNC: 3.7 G/DL (ref 3.4–5)
ALP SERPL-CCNC: 76 U/L (ref 33–110)
ALT SERPL W P-5'-P-CCNC: 76 U/L (ref 7–45)
ANION GAP SERPL CALC-SCNC: 10 MMOL/L (ref 10–20)
ANION GAP SERPL CALC-SCNC: 11 MMOL/L (ref 10–20)
ANION GAP SERPL CALC-SCNC: 9 MMOL/L (ref 10–20)
AST SERPL W P-5'-P-CCNC: 232 U/L (ref 9–39)
BACTERIA BLD CULT: NORMAL
BACTERIA BLD CULT: NORMAL
BASOPHILS # BLD AUTO: 0.06 X10*3/UL (ref 0–0.1)
BASOPHILS NFR BLD AUTO: 0.8 %
BILIRUB DIRECT SERPL-MCNC: 0.4 MG/DL (ref 0–0.3)
BILIRUB SERPL-MCNC: 1.3 MG/DL (ref 0–1.2)
BUN SERPL-MCNC: 5 MG/DL (ref 6–23)
BUN SERPL-MCNC: 6 MG/DL (ref 6–23)
CALCIUM SERPL-MCNC: 8.5 MG/DL (ref 8.6–10.3)
CALCIUM SERPL-MCNC: 8.5 MG/DL (ref 8.6–10.3)
CALCIUM SERPL-MCNC: 8.7 MG/DL (ref 8.6–10.3)
CALCIUM SERPL-MCNC: 8.8 MG/DL (ref 8.6–10.3)
CALCIUM SERPL-MCNC: 8.9 MG/DL (ref 8.6–10.3)
CHLORIDE SERPL-SCNC: 92 MMOL/L (ref 98–107)
CHLORIDE SERPL-SCNC: 92 MMOL/L (ref 98–107)
CHLORIDE SERPL-SCNC: 93 MMOL/L (ref 98–107)
CO2 SERPL-SCNC: 26 MMOL/L (ref 21–32)
CO2 SERPL-SCNC: 27 MMOL/L (ref 21–32)
CO2 SERPL-SCNC: 28 MMOL/L (ref 21–32)
CO2 SERPL-SCNC: 29 MMOL/L (ref 21–32)
CO2 SERPL-SCNC: 29 MMOL/L (ref 21–32)
CREAT SERPL-MCNC: 0.4 MG/DL (ref 0.5–1.05)
CREAT SERPL-MCNC: 0.4 MG/DL (ref 0.5–1.05)
CREAT SERPL-MCNC: 0.47 MG/DL (ref 0.5–1.05)
CREAT SERPL-MCNC: 0.47 MG/DL (ref 0.5–1.05)
CREAT SERPL-MCNC: 0.51 MG/DL (ref 0.5–1.05)
EGFRCR SERPLBLD CKD-EPI 2021: >90 ML/MIN/1.73M*2
EOSINOPHIL # BLD AUTO: 0.12 X10*3/UL (ref 0–0.7)
EOSINOPHIL NFR BLD AUTO: 1.7 %
ERYTHROCYTE [DISTWIDTH] IN BLOOD BY AUTOMATED COUNT: 11.9 % (ref 11.5–14.5)
GLUCOSE SERPL-MCNC: 100 MG/DL (ref 74–99)
GLUCOSE SERPL-MCNC: 88 MG/DL (ref 74–99)
GLUCOSE SERPL-MCNC: 95 MG/DL (ref 74–99)
GLUCOSE SERPL-MCNC: 97 MG/DL (ref 74–99)
GLUCOSE SERPL-MCNC: 98 MG/DL (ref 74–99)
HCT VFR BLD AUTO: 34.3 % (ref 36–46)
HGB BLD-MCNC: 11.9 G/DL (ref 12–16)
IMM GRANULOCYTES # BLD AUTO: 0.06 X10*3/UL (ref 0–0.7)
IMM GRANULOCYTES NFR BLD AUTO: 0.8 % (ref 0–0.9)
LYMPHOCYTES # BLD AUTO: 1.43 X10*3/UL (ref 1.2–4.8)
LYMPHOCYTES NFR BLD AUTO: 19.8 %
MAGNESIUM SERPL-MCNC: 1.79 MG/DL (ref 1.6–2.4)
MCH RBC QN AUTO: 31.6 PG (ref 26–34)
MCHC RBC AUTO-ENTMCNC: 34.7 G/DL (ref 32–36)
MCV RBC AUTO: 91 FL (ref 80–100)
MONOCYTES # BLD AUTO: 0.64 X10*3/UL (ref 0.1–1)
MONOCYTES NFR BLD AUTO: 8.9 %
NEUTROPHILS # BLD AUTO: 4.92 X10*3/UL (ref 1.2–7.7)
NEUTROPHILS NFR BLD AUTO: 68 %
NRBC BLD-RTO: 0 /100 WBCS (ref 0–0)
PHOSPHATE SERPL-MCNC: 2.1 MG/DL (ref 2.5–4.9)
PHOSPHATE SERPL-MCNC: 2.1 MG/DL (ref 2.5–4.9)
PHOSPHATE SERPL-MCNC: 2.3 MG/DL (ref 2.5–4.9)
PHOSPHATE SERPL-MCNC: 2.4 MG/DL (ref 2.5–4.9)
PHOSPHATE SERPL-MCNC: 2.5 MG/DL (ref 2.5–4.9)
PLATELET # BLD AUTO: 168 X10*3/UL (ref 150–450)
POTASSIUM SERPL-SCNC: 3.9 MMOL/L (ref 3.5–5.3)
POTASSIUM SERPL-SCNC: 4.2 MMOL/L (ref 3.5–5.3)
POTASSIUM SERPL-SCNC: 4.8 MMOL/L (ref 3.5–5.3)
PROT SERPL-MCNC: 6.1 G/DL (ref 6.4–8.2)
RBC # BLD AUTO: 3.77 X10*6/UL (ref 4–5.2)
SODIUM SERPL-SCNC: 123 MMOL/L (ref 136–145)
SODIUM SERPL-SCNC: 125 MMOL/L (ref 136–145)
SODIUM SERPL-SCNC: 126 MMOL/L (ref 136–145)
SODIUM SERPL-SCNC: 127 MMOL/L (ref 136–145)
SODIUM SERPL-SCNC: 128 MMOL/L (ref 136–145)
VIT B12 SERPL-MCNC: 405 PG/ML (ref 211–911)
WBC # BLD AUTO: 7.2 X10*3/UL (ref 4.4–11.3)

## 2025-08-17 PROCEDURE — 2500000001 HC RX 250 WO HCPCS SELF ADMINISTERED DRUGS (ALT 637 FOR MEDICARE OP)

## 2025-08-17 PROCEDURE — S4991 NICOTINE PATCH NONLEGEND: HCPCS

## 2025-08-17 PROCEDURE — 82248 BILIRUBIN DIRECT: CPT

## 2025-08-17 PROCEDURE — 97535 SELF CARE MNGMENT TRAINING: CPT | Mod: GO | Performed by: OCCUPATIONAL THERAPIST

## 2025-08-17 PROCEDURE — 82607 VITAMIN B-12: CPT | Mod: GEALAB

## 2025-08-17 PROCEDURE — 97165 OT EVAL LOW COMPLEX 30 MIN: CPT | Mod: GO | Performed by: OCCUPATIONAL THERAPIST

## 2025-08-17 PROCEDURE — 80069 RENAL FUNCTION PANEL: CPT | Mod: CCI | Performed by: STUDENT IN AN ORGANIZED HEALTH CARE EDUCATION/TRAINING PROGRAM

## 2025-08-17 PROCEDURE — 97162 PT EVAL MOD COMPLEX 30 MIN: CPT | Mod: GP

## 2025-08-17 PROCEDURE — 1200000002 HC GENERAL ROOM WITH TELEMETRY DAILY

## 2025-08-17 PROCEDURE — 2500000004 HC RX 250 GENERAL PHARMACY W/ HCPCS (ALT 636 FOR OP/ED)

## 2025-08-17 PROCEDURE — 85025 COMPLETE CBC W/AUTO DIFF WBC: CPT

## 2025-08-17 PROCEDURE — 82040 ASSAY OF SERUM ALBUMIN: CPT

## 2025-08-17 PROCEDURE — 99233 SBSQ HOSP IP/OBS HIGH 50: CPT

## 2025-08-17 PROCEDURE — 2500000002 HC RX 250 W HCPCS SELF ADMINISTERED DRUGS (ALT 637 FOR MEDICARE OP, ALT 636 FOR OP/ED)

## 2025-08-17 PROCEDURE — 84100 ASSAY OF PHOSPHORUS: CPT | Performed by: STUDENT IN AN ORGANIZED HEALTH CARE EDUCATION/TRAINING PROGRAM

## 2025-08-17 PROCEDURE — 83735 ASSAY OF MAGNESIUM: CPT

## 2025-08-17 PROCEDURE — 36415 COLL VENOUS BLD VENIPUNCTURE: CPT | Performed by: STUDENT IN AN ORGANIZED HEALTH CARE EDUCATION/TRAINING PROGRAM

## 2025-08-17 PROCEDURE — 36415 COLL VENOUS BLD VENIPUNCTURE: CPT

## 2025-08-17 RX ORDER — MULTIVIT-MIN/IRON FUM/FOLIC AC 7.5 MG-4
1 TABLET ORAL DAILY
Status: DISCONTINUED | OUTPATIENT
Start: 2025-08-17 | End: 2025-08-18 | Stop reason: HOSPADM

## 2025-08-17 RX ORDER — NICOTINE 7MG/24HR
1 PATCH, TRANSDERMAL 24 HOURS TRANSDERMAL DAILY
Status: DISCONTINUED | OUTPATIENT
Start: 2025-09-28 | End: 2025-08-18 | Stop reason: HOSPADM

## 2025-08-17 RX ORDER — GABAPENTIN 300 MG/1
300 CAPSULE ORAL 2 TIMES DAILY
Status: DISCONTINUED | OUTPATIENT
Start: 2025-08-17 | End: 2025-08-18 | Stop reason: HOSPADM

## 2025-08-17 RX ORDER — LANOLIN ALCOHOL/MO/W.PET/CERES
100 CREAM (GRAM) TOPICAL DAILY
Status: DISCONTINUED | OUTPATIENT
Start: 2025-08-18 | End: 2025-08-18 | Stop reason: HOSPADM

## 2025-08-17 RX ORDER — IBUPROFEN 200 MG
1 TABLET ORAL DAILY
Status: DISCONTINUED | OUTPATIENT
Start: 2025-08-17 | End: 2025-08-18 | Stop reason: HOSPADM

## 2025-08-17 RX ORDER — FOLIC ACID 1 MG/1
1 TABLET ORAL DAILY
Status: DISCONTINUED | OUTPATIENT
Start: 2025-08-17 | End: 2025-08-18 | Stop reason: HOSPADM

## 2025-08-17 RX ADMIN — FOLIC ACID 1 MG: 1 TABLET ORAL at 16:58

## 2025-08-17 RX ADMIN — ENOXAPARIN SODIUM 40 MG: 100 INJECTION SUBCUTANEOUS at 20:45

## 2025-08-17 RX ADMIN — THIAMINE HYDROCHLORIDE 500 MG: 100 INJECTION, SOLUTION INTRAMUSCULAR; INTRAVENOUS at 13:20

## 2025-08-17 RX ADMIN — GABAPENTIN 300 MG: 300 CAPSULE ORAL at 13:20

## 2025-08-17 RX ADMIN — Medication 1 PATCH: at 13:20

## 2025-08-17 RX ADMIN — THIAMINE HYDROCHLORIDE 500 MG: 100 INJECTION, SOLUTION INTRAMUSCULAR; INTRAVENOUS at 05:03

## 2025-08-17 RX ADMIN — GABAPENTIN 300 MG: 300 CAPSULE ORAL at 20:45

## 2025-08-17 RX ADMIN — Medication 1 TABLET: at 16:58

## 2025-08-17 ASSESSMENT — LIFESTYLE VARIABLES
AGITATION: NORMAL ACTIVITY
ORIENTATION AND CLOUDING OF SENSORIUM: CANNOT DO SERIAL ADDITIONS OR IS UNCERTAIN ABOUT DATE
TOTAL SCORE: 2
TREMOR: NOT VISIBLE, BUT CAN BE FELT FINGERTIP TO FINGERTIP
PAROXYSMAL SWEATS: NO SWEAT VISIBLE
HEADACHE, FULLNESS IN HEAD: NOT PRESENT
AUDITORY DISTURBANCES: NOT PRESENT
ORIENTATION AND CLOUDING OF SENSORIUM: CANNOT DO SERIAL ADDITIONS OR IS UNCERTAIN ABOUT DATE
AGITATION: NORMAL ACTIVITY
PAROXYSMAL SWEATS: NO SWEAT VISIBLE
PULSE: 96
AGITATION: NORMAL ACTIVITY
ANXIETY: 2
HEADACHE, FULLNESS IN HEAD: NOT PRESENT
TREMOR: NOT VISIBLE, BUT CAN BE FELT FINGERTIP TO FINGERTIP
HEADACHE, FULLNESS IN HEAD: NOT PRESENT
VISUAL DISTURBANCES: NOT PRESENT
ANXIETY: NO ANXIETY, AT EASE
AUDITORY DISTURBANCES: NOT PRESENT
NAUSEA AND VOMITING: NO NAUSEA AND NO VOMITING
VISUAL DISTURBANCES: NOT PRESENT
ANXIETY: NO ANXIETY, AT EASE
NAUSEA AND VOMITING: NO NAUSEA AND NO VOMITING
ORIENTATION AND CLOUDING OF SENSORIUM: ORIENTED AND CAN DO SERIAL ADDITIONS
VISUAL DISTURBANCES: NOT PRESENT
BLOOD PRESSURE: 138/66
TOTAL SCORE: 4
TREMOR: NOT VISIBLE, BUT CAN BE FELT FINGERTIP TO FINGERTIP
NAUSEA AND VOMITING: NO NAUSEA AND NO VOMITING
AUDITORY DISTURBANCES: NOT PRESENT
PAROXYSMAL SWEATS: NO SWEAT VISIBLE
TOTAL SCORE: 1

## 2025-08-17 ASSESSMENT — COGNITIVE AND FUNCTIONAL STATUS - GENERAL
CLIMB 3 TO 5 STEPS WITH RAILING: TOTAL
TOILETING: TOTAL
PERSONAL GROOMING: A LITTLE
MOVING FROM LYING ON BACK TO SITTING ON SIDE OF FLAT BED WITH BEDRAILS: A LITTLE
DAILY ACTIVITIY SCORE: 24
DAILY ACTIVITIY SCORE: 15
MOBILITY SCORE: 19
CLIMB 3 TO 5 STEPS WITH RAILING: A LITTLE
DAILY ACTIVITIY SCORE: 24
WALKING IN HOSPITAL ROOM: A LOT
HELP NEEDED FOR BATHING: A LOT
DRESSING REGULAR UPPER BODY CLOTHING: A LITTLE
MOVING TO AND FROM BED TO CHAIR: A LOT
STANDING UP FROM CHAIR USING ARMS: A LITTLE
CLIMB 3 TO 5 STEPS WITH RAILING: A LITTLE
MOBILITY SCORE: 14
MOVING TO AND FROM BED TO CHAIR: A LITTLE
TURNING FROM BACK TO SIDE WHILE IN FLAT BAD: A LITTLE
TURNING FROM BACK TO SIDE WHILE IN FLAT BAD: A LITTLE
DRESSING REGULAR LOWER BODY CLOTHING: A LOT
WALKING IN HOSPITAL ROOM: A LITTLE
STANDING UP FROM CHAIR USING ARMS: A LITTLE
WALKING IN HOSPITAL ROOM: A LITTLE
STANDING UP FROM CHAIR USING ARMS: A LITTLE
MOBILITY SCORE: 19
TURNING FROM BACK TO SIDE WHILE IN FLAT BAD: A LITTLE
MOVING TO AND FROM BED TO CHAIR: A LITTLE

## 2025-08-17 ASSESSMENT — PAIN - FUNCTIONAL ASSESSMENT
PAIN_FUNCTIONAL_ASSESSMENT: 0-10

## 2025-08-17 ASSESSMENT — PAIN SCALES - GENERAL
PAINLEVEL_OUTOF10: 0 - NO PAIN

## 2025-08-17 ASSESSMENT — ACTIVITIES OF DAILY LIVING (ADL)
HOME_MANAGEMENT_TIME_ENTRY: 12
BATHING_ASSISTANCE: MODERATE
ADL_ASSISTANCE: INDEPENDENT

## 2025-08-18 ENCOUNTER — PHARMACY VISIT (OUTPATIENT)
Dept: PHARMACY | Facility: CLINIC | Age: 55
End: 2025-08-18
Payer: COMMERCIAL

## 2025-08-18 VITALS
RESPIRATION RATE: 16 BRPM | OXYGEN SATURATION: 93 % | HEIGHT: 72 IN | HEART RATE: 111 BPM | BODY MASS INDEX: 23.07 KG/M2 | DIASTOLIC BLOOD PRESSURE: 73 MMHG | TEMPERATURE: 98.4 F | WEIGHT: 170.3 LBS | SYSTOLIC BLOOD PRESSURE: 154 MMHG

## 2025-08-18 PROBLEM — F10.931 DELIRIUM TREMENS (MULTI): Status: RESOLVED | Noted: 2025-08-14 | Resolved: 2025-08-18

## 2025-08-18 PROBLEM — F10.139 ALCOHOL ABUSE WITH WITHDRAWAL (MULTI): Status: RESOLVED | Noted: 2025-08-14 | Resolved: 2025-08-18

## 2025-08-18 LAB
ALBUMIN SERPL BCP-MCNC: 3.4 G/DL (ref 3.4–5)
ALBUMIN SERPL BCP-MCNC: 3.4 G/DL (ref 3.4–5)
ALBUMIN SERPL BCP-MCNC: 3.5 G/DL (ref 3.4–5)
ALP SERPL-CCNC: 75 U/L (ref 33–110)
ALT SERPL W P-5'-P-CCNC: 69 U/L (ref 7–45)
ANION GAP SERPL CALC-SCNC: 10 MMOL/L (ref 10–20)
ANION GAP SERPL CALC-SCNC: 11 MMOL/L (ref 10–20)
ANION GAP SERPL CALC-SCNC: 13 MMOL/L (ref 10–20)
ANION GAP SERPL CALC-SCNC: 14 MMOL/L (ref 10–20)
AST SERPL W P-5'-P-CCNC: 153 U/L (ref 9–39)
BASOPHILS # BLD AUTO: 0.06 X10*3/UL (ref 0–0.1)
BASOPHILS NFR BLD AUTO: 1 %
BILIRUB DIRECT SERPL-MCNC: 0.3 MG/DL (ref 0–0.3)
BILIRUB SERPL-MCNC: 1 MG/DL (ref 0–1.2)
BUN SERPL-MCNC: 6 MG/DL (ref 6–23)
BUN SERPL-MCNC: 7 MG/DL (ref 6–23)
BUN SERPL-MCNC: 7 MG/DL (ref 6–23)
BUN SERPL-MCNC: 9 MG/DL (ref 6–23)
CALCIUM SERPL-MCNC: 8.7 MG/DL (ref 8.6–10.3)
CALCIUM SERPL-MCNC: 8.8 MG/DL (ref 8.6–10.3)
CALCIUM SERPL-MCNC: 8.9 MG/DL (ref 8.6–10.3)
CALCIUM SERPL-MCNC: 8.9 MG/DL (ref 8.6–10.3)
CHLORIDE SERPL-SCNC: 94 MMOL/L (ref 98–107)
CHLORIDE SERPL-SCNC: 95 MMOL/L (ref 98–107)
CO2 SERPL-SCNC: 24 MMOL/L (ref 21–32)
CO2 SERPL-SCNC: 26 MMOL/L (ref 21–32)
CO2 SERPL-SCNC: 27 MMOL/L (ref 21–32)
CO2 SERPL-SCNC: 28 MMOL/L (ref 21–32)
CREAT SERPL-MCNC: 0.41 MG/DL (ref 0.5–1.05)
CREAT SERPL-MCNC: 0.42 MG/DL (ref 0.5–1.05)
CREAT SERPL-MCNC: 0.43 MG/DL (ref 0.5–1.05)
CREAT SERPL-MCNC: 0.47 MG/DL (ref 0.5–1.05)
EGFRCR SERPLBLD CKD-EPI 2021: >90 ML/MIN/1.73M*2
EOSINOPHIL # BLD AUTO: 0.2 X10*3/UL (ref 0–0.7)
EOSINOPHIL NFR BLD AUTO: 3.3 %
ERYTHROCYTE [DISTWIDTH] IN BLOOD BY AUTOMATED COUNT: 12 % (ref 11.5–14.5)
GLUCOSE SERPL-MCNC: 101 MG/DL (ref 74–99)
GLUCOSE SERPL-MCNC: 92 MG/DL (ref 74–99)
GLUCOSE SERPL-MCNC: 95 MG/DL (ref 74–99)
GLUCOSE SERPL-MCNC: 99 MG/DL (ref 74–99)
HCT VFR BLD AUTO: 34.9 % (ref 36–46)
HGB BLD-MCNC: 12.2 G/DL (ref 12–16)
HOLD SPECIMEN: NORMAL
IMM GRANULOCYTES # BLD AUTO: 0.07 X10*3/UL (ref 0–0.7)
IMM GRANULOCYTES NFR BLD AUTO: 1.1 % (ref 0–0.9)
LYMPHOCYTES # BLD AUTO: 1.37 X10*3/UL (ref 1.2–4.8)
LYMPHOCYTES NFR BLD AUTO: 22.3 %
MAGNESIUM SERPL-MCNC: 1.65 MG/DL (ref 1.6–2.4)
MCH RBC QN AUTO: 31.9 PG (ref 26–34)
MCHC RBC AUTO-ENTMCNC: 35 G/DL (ref 32–36)
MCV RBC AUTO: 91 FL (ref 80–100)
MONOCYTES # BLD AUTO: 0.68 X10*3/UL (ref 0.1–1)
MONOCYTES NFR BLD AUTO: 11.1 %
NEUTROPHILS # BLD AUTO: 3.75 X10*3/UL (ref 1.2–7.7)
NEUTROPHILS NFR BLD AUTO: 61.2 %
NRBC BLD-RTO: 0 /100 WBCS (ref 0–0)
PHOSPHATE SERPL-MCNC: 2.7 MG/DL (ref 2.5–4.9)
PHOSPHATE SERPL-MCNC: 3 MG/DL (ref 2.5–4.9)
PHOSPHATE SERPL-MCNC: 3.1 MG/DL (ref 2.5–4.9)
PHOSPHATE SERPL-MCNC: 3.3 MG/DL (ref 2.5–4.9)
PLATELET # BLD AUTO: 163 X10*3/UL (ref 150–450)
POTASSIUM SERPL-SCNC: 3.7 MMOL/L (ref 3.5–5.3)
POTASSIUM SERPL-SCNC: 3.7 MMOL/L (ref 3.5–5.3)
POTASSIUM SERPL-SCNC: 3.8 MMOL/L (ref 3.5–5.3)
POTASSIUM SERPL-SCNC: 3.9 MMOL/L (ref 3.5–5.3)
PROT SERPL-MCNC: 6.2 G/DL (ref 6.4–8.2)
RBC # BLD AUTO: 3.82 X10*6/UL (ref 4–5.2)
SODIUM SERPL-SCNC: 128 MMOL/L (ref 136–145)
SODIUM SERPL-SCNC: 128 MMOL/L (ref 136–145)
SODIUM SERPL-SCNC: 129 MMOL/L (ref 136–145)
SODIUM SERPL-SCNC: 129 MMOL/L (ref 136–145)
WBC # BLD AUTO: 6.1 X10*3/UL (ref 4.4–11.3)

## 2025-08-18 PROCEDURE — 85025 COMPLETE CBC W/AUTO DIFF WBC: CPT

## 2025-08-18 PROCEDURE — 83735 ASSAY OF MAGNESIUM: CPT

## 2025-08-18 PROCEDURE — S4991 NICOTINE PATCH NONLEGEND: HCPCS

## 2025-08-18 PROCEDURE — 99233 SBSQ HOSP IP/OBS HIGH 50: CPT | Performed by: INTERNAL MEDICINE

## 2025-08-18 PROCEDURE — 80069 RENAL FUNCTION PANEL: CPT | Mod: CCI

## 2025-08-18 PROCEDURE — 82247 BILIRUBIN TOTAL: CPT

## 2025-08-18 PROCEDURE — 2500000001 HC RX 250 WO HCPCS SELF ADMINISTERED DRUGS (ALT 637 FOR MEDICARE OP)

## 2025-08-18 PROCEDURE — 2500000004 HC RX 250 GENERAL PHARMACY W/ HCPCS (ALT 636 FOR OP/ED)

## 2025-08-18 PROCEDURE — 82040 ASSAY OF SERUM ALBUMIN: CPT

## 2025-08-18 PROCEDURE — 99239 HOSP IP/OBS DSCHRG MGMT >30: CPT

## 2025-08-18 PROCEDURE — 2500000002 HC RX 250 W HCPCS SELF ADMINISTERED DRUGS (ALT 637 FOR MEDICARE OP, ALT 636 FOR OP/ED)

## 2025-08-18 PROCEDURE — 36415 COLL VENOUS BLD VENIPUNCTURE: CPT

## 2025-08-18 PROCEDURE — RXMED WILLOW AMBULATORY MEDICATION CHARGE

## 2025-08-18 RX ORDER — LANOLIN ALCOHOL/MO/W.PET/CERES
100 CREAM (GRAM) TOPICAL DAILY
Qty: 30 TABLET | Refills: 11 | Status: SHIPPED | OUTPATIENT
Start: 2025-08-19

## 2025-08-18 RX ORDER — POTASSIUM CHLORIDE 20 MEQ/1
20 TABLET, EXTENDED RELEASE ORAL ONCE
Status: COMPLETED | OUTPATIENT
Start: 2025-08-18 | End: 2025-08-18

## 2025-08-18 RX ORDER — IPRATROPIUM BROMIDE AND ALBUTEROL SULFATE 2.5; .5 MG/3ML; MG/3ML
3 SOLUTION RESPIRATORY (INHALATION) EVERY 2 HOUR PRN
Status: DISCONTINUED | OUTPATIENT
Start: 2025-08-18 | End: 2025-08-18 | Stop reason: HOSPADM

## 2025-08-18 RX ORDER — FOLIC ACID 1 MG/1
1 TABLET ORAL DAILY
Qty: 30 TABLET | Refills: 11 | Status: SHIPPED | OUTPATIENT
Start: 2025-08-19

## 2025-08-18 RX ORDER — MAGNESIUM SULFATE HEPTAHYDRATE 40 MG/ML
2 INJECTION, SOLUTION INTRAVENOUS ONCE
Status: COMPLETED | OUTPATIENT
Start: 2025-08-18 | End: 2025-08-18

## 2025-08-18 RX ADMIN — FOLIC ACID 1 MG: 1 TABLET ORAL at 08:25

## 2025-08-18 RX ADMIN — MAGNESIUM SULFATE HEPTAHYDRATE 2 G: 40 INJECTION, SOLUTION INTRAVENOUS at 09:24

## 2025-08-18 RX ADMIN — Medication 1 PATCH: at 08:25

## 2025-08-18 RX ADMIN — Medication 1 TABLET: at 08:25

## 2025-08-18 RX ADMIN — POTASSIUM CHLORIDE 20 MEQ: 1500 TABLET, EXTENDED RELEASE ORAL at 09:25

## 2025-08-18 RX ADMIN — GABAPENTIN 300 MG: 300 CAPSULE ORAL at 08:25

## 2025-08-18 RX ADMIN — THIAMINE HCL TAB 100 MG 100 MG: 100 TAB at 08:25

## 2025-08-18 ASSESSMENT — COGNITIVE AND FUNCTIONAL STATUS - GENERAL
WALKING IN HOSPITAL ROOM: A LITTLE
DAILY ACTIVITIY SCORE: 24
TURNING FROM BACK TO SIDE WHILE IN FLAT BAD: A LITTLE
MOBILITY SCORE: 19
MOVING TO AND FROM BED TO CHAIR: A LITTLE
CLIMB 3 TO 5 STEPS WITH RAILING: A LITTLE
STANDING UP FROM CHAIR USING ARMS: A LITTLE

## 2025-08-18 ASSESSMENT — PAIN SCALES - GENERAL: PAINLEVEL_OUTOF10: 0 - NO PAIN

## 2025-08-18 ASSESSMENT — PAIN - FUNCTIONAL ASSESSMENT: PAIN_FUNCTIONAL_ASSESSMENT: 0-10

## 2025-08-19 ENCOUNTER — TELEPHONE (OUTPATIENT)
Dept: INTERNAL MEDICINE | Facility: HOSPITAL | Age: 55
End: 2025-08-19
Payer: COMMERCIAL

## 2025-08-19 LAB
BACTERIA BLD CULT: NORMAL
BACTERIA BLD CULT: NORMAL

## 2025-08-22 LAB
ATRIAL RATE: 115 BPM
P AXIS: 76 DEGREES
P OFFSET: 193 MS
P ONSET: 150 MS
PR INTERVAL: 134 MS
Q ONSET: 217 MS
QRS COUNT: 19 BEATS
QRS DURATION: 100 MS
QT INTERVAL: 410 MS
QTC CALCULATION(BAZETT): 567 MS
QTC FREDERICIA: 509 MS
R AXIS: -16 DEGREES
T AXIS: 62 DEGREES
T OFFSET: 422 MS
VENTRICULAR RATE: 115 BPM

## (undated) DEVICE — 3M™ COBAN™ STERILE SELF-ADHERENT WRAP, 1584S, 4 IN X 5 YD (10 CM X 4,5 M), 18 ROLLS/CASE: Brand: 3M™ COBAN™

## (undated) DEVICE — GAUZE,SPONGE,4"X4",16PLY,XRAY,STRL,LF: Brand: MEDLINE

## (undated) DEVICE — BANDAGE,GAUZE,BULKEE II,4.5"X4.1YD,STRL: Brand: MEDLINE

## (undated) DEVICE — HOOK LOCK LATEX FREE ELASTIC BANDAGE 3INX5YD

## (undated) DEVICE — 1810 FOAM BLOCK NEEDLE COUNTER: Brand: DEVON

## (undated) DEVICE — SYRINGE, LUER LOCK, 10ML: Brand: MEDLINE

## (undated) DEVICE — SET SURG INSTR PODI

## (undated) DEVICE — COVER,LIGHT HANDLE,FLX,1/PK: Brand: MEDLINE INDUSTRIES, INC.

## (undated) DEVICE — INTENDED FOR TISSUE SEPARATION, AND OTHER PROCEDURES THAT REQUIRE A SHARP SURGICAL BLADE TO PUNCTURE OR CUT.: Brand: BARD-PARKER ® STAINLESS STEEL BLADES

## (undated) DEVICE — PEN: MARKING STD 100/CS: Brand: MEDICAL ACTION INDUSTRIES

## (undated) DEVICE — MEDI-VAC NON-CONDUCTIVE SUCTION TUBING: Brand: CARDINAL HEALTH

## (undated) DEVICE — DOUBLE BASIN SET: Brand: MEDLINE INDUSTRIES, INC.

## (undated) DEVICE — TIBURON EXTREMITY SHEET: Brand: CONVERTORS

## (undated) DEVICE — SOLUTION IRRIG 1000ML 09% SOD CHL USP PIC PLAS CONTAINER

## (undated) DEVICE — Z CONVERTED USE 2275207 CLOTH PREP W7.5XL7.5IN 2% CHG SKIN ALC AND RNS FREE

## (undated) DEVICE — SPONGE,LAP,12"X12",XR,ST,5/PK,40PK/CS: Brand: MEDLINE

## (undated) DEVICE — DRESSING GZ W1XL8IN COT XRFRM N ADH OVERWRAP CURAD

## (undated) DEVICE — PUNCH SURG OD4MM YEL RIB CNTOUR HNDL SHRP TIP PRESTERILIZED

## (undated) DEVICE — GLOVE ORANGE PI 7 1/2   MSG9075

## (undated) DEVICE — DRESSING GZ XRFRM 4X4(25/BX 6BX/CS)

## (undated) DEVICE — STANDARD HYPODERMIC NEEDLE,POLYPROPYLENE HUB: Brand: MONOJECT

## (undated) DEVICE — NEEDLE HYPO 25GA L1.5IN BLU POLYPR HUB S STL REG BVL STR

## (undated) DEVICE — TOWEL OR BLUEE 16X26IN ST 8 PACK ORB08 16X26ORTWL

## (undated) DEVICE — SET SURG INSTR DISSECT

## (undated) DEVICE — GLOVE ORANGE PI 7   MSG9070

## (undated) DEVICE — TUBING, SUCTION, 1/4" X 10', STRAIGHT: Brand: MEDLINE

## (undated) DEVICE — DRESSING ALG W2XL5IN ANTIMIC WND JUMPSTART

## (undated) DEVICE — SET SURG BASIN MAYO REUSABLE

## (undated) DEVICE — GAUZE,SPONGE,4"X4",16PLY,STRL,LF,10/TRAY: Brand: MEDLINE

## (undated) DEVICE — BASIC PACK: Brand: CONVERTORS

## (undated) DEVICE — GLOVE,SURG,SENSICARE,ALOE,LF,PF,7: Brand: MEDLINE

## (undated) DEVICE — HANDLE CVR PATENTED RETENTION DISC STRL LIGHT SHLD

## (undated) DEVICE — GOWN,SIRUS,NONRNF,SETINSLV,XL,20/CS: Brand: MEDLINE

## (undated) DEVICE — NEEDLE HYPO 18GA L1.5IN PNK POLYPR HUB S STL THN WALL FILL

## (undated) DEVICE — MARKER,SKIN,WI/RULER AND LABELS: Brand: MEDLINE

## (undated) DEVICE — 12 ML SYRINGE,LUER-LOCK TIP: Brand: MONOJECT

## (undated) DEVICE — BRACE WLK FULL SHELL WLK L M SHOE SZ 10 - 13 FEM SHOE SZ 11

## (undated) DEVICE — ENCORE® LATEX TEXTURED SIZE 7.5, STERILE LATEX POWDER-FREE SURGICAL GLOVE: Brand: ENCORE

## (undated) DEVICE — DRAPE 84X54IN RADIOLOGY C ARM KEYBOARD

## (undated) DEVICE — SYRINGE BLB 50CC IRRIG PLIABLE FNGR FLNG GRAD FLSK DISP

## (undated) DEVICE — CHLORAPREP 26ML ORANGE

## (undated) DEVICE — SYRINGE IRRIG 60ML SFT PLIABLE BLB EZ TO GRP 1 HND USE W/

## (undated) DEVICE — BANDAGE,GAUZE,4.5"X4.1YD,STERILE,LF: Brand: MEDLINE

## (undated) DEVICE — PRECISION THIN (5.5 X 0.38 X 18.0MM)

## (undated) DEVICE — CANNULA IV 18GA L15IN BLNT FILL LUERLOCK HUB MJCT

## (undated) DEVICE — SUTURE MCRYL SZ 3-0 L27IN ABSRB UD L26MM SH 1/2 CIR Y416H

## (undated) DEVICE — NDL CNTR 40CT FM MAG: Brand: MEDLINE INDUSTRIES, INC.

## (undated) DEVICE — TOWEL,OR,DSP,ST,BLUE,STD,6/PK,12PK/CS: Brand: MEDLINE

## (undated) DEVICE — PACK EXT II SIRUS

## (undated) DEVICE — SUTURE ETHLN SZ 4-0 L18IN NONABSORBABLE BLK L19MM PS-2 3/8 1667H

## (undated) DEVICE — GLOVE SURG 7.5 LTX TRIFLEX WIDE FINGER PWDR

## (undated) DEVICE — HANDPIECE SET WITH BONE CLEANING TIP: Brand: INTERPULSE

## (undated) DEVICE — SWAB SPEC COLL SHFT L5.25IN POLYUR FOAM TIP SFT DBL MEDIA

## (undated) DEVICE — BANDAGE,SELF ADHRNT,COFLEX,4"X5YD,STRL: Brand: COLABEL

## (undated) DEVICE — SOLUTION IV IRRIG POUR BRL 0.9% SODIUM CHL 2F7124

## (undated) DEVICE — SMALL TEAR CROSS CUT RASP (11.0 X 5.0MM)